# Patient Record
Sex: FEMALE | Race: WHITE | NOT HISPANIC OR LATINO | Employment: OTHER | URBAN - METROPOLITAN AREA
[De-identification: names, ages, dates, MRNs, and addresses within clinical notes are randomized per-mention and may not be internally consistent; named-entity substitution may affect disease eponyms.]

---

## 2017-01-10 ENCOUNTER — HOSPITAL ENCOUNTER (OUTPATIENT)
Dept: PHYSICAL THERAPY | Facility: MEDICAL CENTER | Age: 72
End: 2017-01-10
Attending: INTERNAL MEDICINE
Payer: MEDICARE

## 2017-01-10 PROCEDURE — G8979 MOBILITY GOAL STATUS: HCPCS | Mod: CI

## 2017-01-10 PROCEDURE — 97014 ELECTRIC STIMULATION THERAPY: CPT

## 2017-01-10 PROCEDURE — G8978 MOBILITY CURRENT STATUS: HCPCS | Mod: CJ

## 2017-01-10 PROCEDURE — 97110 THERAPEUTIC EXERCISES: CPT

## 2017-01-31 ENCOUNTER — HOSPITAL ENCOUNTER (OUTPATIENT)
Dept: PHYSICAL THERAPY | Facility: MEDICAL CENTER | Age: 72
End: 2017-01-31
Attending: INTERNAL MEDICINE
Payer: MEDICARE

## 2017-01-31 PROCEDURE — 97110 THERAPEUTIC EXERCISES: CPT

## 2017-01-31 PROCEDURE — G8979 MOBILITY GOAL STATUS: HCPCS | Mod: CI

## 2017-01-31 PROCEDURE — G8980 MOBILITY D/C STATUS: HCPCS | Mod: CI

## 2017-04-26 ENCOUNTER — HOSPITAL ENCOUNTER (OUTPATIENT)
Dept: LAB | Facility: MEDICAL CENTER | Age: 72
End: 2017-04-26
Attending: INTERNAL MEDICINE
Payer: MEDICARE

## 2017-04-26 DIAGNOSIS — E11.8 CONTROLLED TYPE 2 DIABETES MELLITUS WITH COMPLICATION, WITHOUT LONG-TERM CURRENT USE OF INSULIN (HCC): ICD-10-CM

## 2017-04-26 LAB
CREAT UR-MCNC: 104.3 MG/DL
GFR SERPL CREATININE-BSD FRML MDRD: >60 ML/MIN/1.73 M 2
MICROALBUMIN UR-MCNC: 1.2 MG/DL
MICROALBUMIN/CREAT UR: 12 MG/G (ref 0–30)

## 2017-04-26 PROCEDURE — 85025 COMPLETE CBC W/AUTO DIFF WBC: CPT

## 2017-04-26 PROCEDURE — 82570 ASSAY OF URINE CREATININE: CPT

## 2017-04-26 PROCEDURE — 80061 LIPID PANEL: CPT

## 2017-04-26 PROCEDURE — 82043 UR ALBUMIN QUANTITATIVE: CPT

## 2017-04-26 PROCEDURE — 83036 HEMOGLOBIN GLYCOSYLATED A1C: CPT

## 2017-04-26 PROCEDURE — 82306 VITAMIN D 25 HYDROXY: CPT

## 2017-04-26 PROCEDURE — 80053 COMPREHEN METABOLIC PANEL: CPT

## 2017-04-26 PROCEDURE — 36415 COLL VENOUS BLD VENIPUNCTURE: CPT

## 2017-04-27 LAB
25(OH)D3 SERPL-MCNC: 41 NG/ML (ref 30–100)
ALBUMIN SERPL BCP-MCNC: 4.1 G/DL (ref 3.2–4.9)
ALBUMIN/GLOB SERPL: 1.5 G/DL
ALP SERPL-CCNC: 48 U/L (ref 30–99)
ALT SERPL-CCNC: 20 U/L (ref 2–50)
ANION GAP SERPL CALC-SCNC: 5 MMOL/L (ref 0–11.9)
AST SERPL-CCNC: 17 U/L (ref 12–45)
BASOPHILS # BLD AUTO: 0.7 % (ref 0–1.8)
BASOPHILS # BLD: 0.03 K/UL (ref 0–0.12)
BILIRUB SERPL-MCNC: 0.4 MG/DL (ref 0.1–1.5)
BUN SERPL-MCNC: 20 MG/DL (ref 8–22)
CALCIUM SERPL-MCNC: 9.4 MG/DL (ref 8.5–10.5)
CHLORIDE SERPL-SCNC: 105 MMOL/L (ref 96–112)
CHOLEST SERPL-MCNC: 128 MG/DL (ref 100–199)
CO2 SERPL-SCNC: 28 MMOL/L (ref 20–33)
CREAT SERPL-MCNC: 0.87 MG/DL (ref 0.5–1.4)
EOSINOPHIL # BLD AUTO: 0.34 K/UL (ref 0–0.51)
EOSINOPHIL NFR BLD: 8.3 % (ref 0–6.9)
ERYTHROCYTE [DISTWIDTH] IN BLOOD BY AUTOMATED COUNT: 44.8 FL (ref 35.9–50)
EST. AVERAGE GLUCOSE BLD GHB EST-MCNC: 123 MG/DL
GLOBULIN SER CALC-MCNC: 2.8 G/DL (ref 1.9–3.5)
GLUCOSE SERPL-MCNC: 100 MG/DL (ref 65–99)
HBA1C MFR BLD: 5.9 % (ref 0–5.6)
HCT VFR BLD AUTO: 43.1 % (ref 37–47)
HDLC SERPL-MCNC: 49 MG/DL
HGB BLD-MCNC: 13.8 G/DL (ref 12–16)
IMM GRANULOCYTES # BLD AUTO: 0.01 K/UL (ref 0–0.11)
IMM GRANULOCYTES NFR BLD AUTO: 0.2 % (ref 0–0.9)
LDLC SERPL CALC-MCNC: 56 MG/DL
LYMPHOCYTES # BLD AUTO: 1.04 K/UL (ref 1–4.8)
LYMPHOCYTES NFR BLD: 25.4 % (ref 22–41)
MCH RBC QN AUTO: 28.9 PG (ref 27–33)
MCHC RBC AUTO-ENTMCNC: 32 G/DL (ref 33.6–35)
MCV RBC AUTO: 90.2 FL (ref 81.4–97.8)
MONOCYTES # BLD AUTO: 0.36 K/UL (ref 0–0.85)
MONOCYTES NFR BLD AUTO: 8.8 % (ref 0–13.4)
NEUTROPHILS # BLD AUTO: 2.31 K/UL (ref 2–7.15)
NEUTROPHILS NFR BLD: 56.6 % (ref 44–72)
NRBC # BLD AUTO: 0 K/UL
NRBC BLD AUTO-RTO: 0 /100 WBC
PLATELET # BLD AUTO: 155 K/UL (ref 164–446)
PMV BLD AUTO: 12.2 FL (ref 9–12.9)
POTASSIUM SERPL-SCNC: 3.8 MMOL/L (ref 3.6–5.5)
PROT SERPL-MCNC: 6.9 G/DL (ref 6–8.2)
RBC # BLD AUTO: 4.78 M/UL (ref 4.2–5.4)
SODIUM SERPL-SCNC: 138 MMOL/L (ref 135–145)
TRIGL SERPL-MCNC: 116 MG/DL (ref 0–149)
WBC # BLD AUTO: 4.1 K/UL (ref 4.8–10.8)

## 2017-05-09 ENCOUNTER — OFFICE VISIT (OUTPATIENT)
Dept: MEDICAL GROUP | Age: 72
End: 2017-05-09
Payer: MEDICARE

## 2017-05-09 VITALS
OXYGEN SATURATION: 95 % | TEMPERATURE: 97.9 F | DIASTOLIC BLOOD PRESSURE: 74 MMHG | BODY MASS INDEX: 35.93 KG/M2 | WEIGHT: 202.8 LBS | SYSTOLIC BLOOD PRESSURE: 146 MMHG | HEIGHT: 63 IN | HEART RATE: 71 BPM

## 2017-05-09 DIAGNOSIS — M15.9 PRIMARY OSTEOARTHRITIS INVOLVING MULTIPLE JOINTS: ICD-10-CM

## 2017-05-09 DIAGNOSIS — E55.9 VITAMIN D DEFICIENCY: ICD-10-CM

## 2017-05-09 DIAGNOSIS — R90.89 ABNORMAL FINDING ON MRI OF BRAIN: ICD-10-CM

## 2017-05-09 DIAGNOSIS — Z85.3 HISTORY OF BREAST CANCER: ICD-10-CM

## 2017-05-09 DIAGNOSIS — E66.9 OBESITY (BMI 30-39.9): ICD-10-CM

## 2017-05-09 DIAGNOSIS — Z12.31 ENCOUNTER FOR SCREENING MAMMOGRAM FOR BREAST CANCER: ICD-10-CM

## 2017-05-09 DIAGNOSIS — I10 ESSENTIAL HYPERTENSION: ICD-10-CM

## 2017-05-09 DIAGNOSIS — E78.2 MIXED HYPERLIPIDEMIA: ICD-10-CM

## 2017-05-09 DIAGNOSIS — E11.8 CONTROLLED TYPE 2 DIABETES MELLITUS WITH COMPLICATION, WITHOUT LONG-TERM CURRENT USE OF INSULIN (HCC): ICD-10-CM

## 2017-05-09 PROCEDURE — 99215 OFFICE O/P EST HI 40 MIN: CPT | Performed by: INTERNAL MEDICINE

## 2017-05-09 ASSESSMENT — ENCOUNTER SYMPTOMS
EYES NEGATIVE: 1
CONSTITUTIONAL NEGATIVE: 1
RESPIRATORY NEGATIVE: 1
GASTROINTESTINAL NEGATIVE: 1
NEUROLOGICAL NEGATIVE: 1
MUSCULOSKELETAL NEGATIVE: 1
CARDIOVASCULAR NEGATIVE: 1
PSYCHIATRIC NEGATIVE: 1

## 2017-05-09 ASSESSMENT — PATIENT HEALTH QUESTIONNAIRE - PHQ9: CLINICAL INTERPRETATION OF PHQ2 SCORE: 0

## 2017-05-09 NOTE — MR AVS SNAPSHOT
"        Jovita Llanos   2017 4:00 PM   Office Visit   MRN: [id number]    Department:  [address] [place] [address]   Dept Phone:  [phone]    Description:  Female : 1945   Provider:  Saurabh Ventura M.D.           Reason for Visit     Hyperlipidemia lab review      Allergies as of 2017     Allergen Noted Reactions    Other Environmental 2015   Runny Nose    Pt. Has seasonal allergies with itchy watery eyes.      You were diagnosed with     Controlled type 2 diabetes mellitus with complication, without long-term current use of insulin (CMS-[facility])   [8273063]       Abnormal finding on MRI of brain   [113889]       Essential hypertension   [1507809]       Mixed hyperlipidemia   [272.2.ICD-9-CM]       Obesity (BMI 30-39.9)   [053595]       Primary osteoarthritis involving multiple joints   [8849480]       Vitamin D deficiency   [2460308]       Abnormal finding on MRI of brain   [382674]       History of breast cancer   [787081]       Encounter for screening mammogram for breast cancer   [1584861]         Vital Signs     Blood Pressure Pulse Temperature Height Weight Body Mass Index    146/74 mmHg 71 36.6 °C (97.9 °F) 1.59 m (5' 2.6\") 91.989 kg (202 lb 12.8 oz) 36.39 kg/m2    Oxygen Saturation Smoking Status                95% Never Smoker           Basic Information     Date Of Birth Sex Race Ethnicity Preferred Language    1945 Female White Unknown English      Your appointments     2017  1:00 PM   Diabetes Care Visit with Saurabh Ventura M.D., MAURICIO DIABETES RN   [address] [id number] [phone]           You will be receiving a confirmation call a few days before your appointment from our automated call confirmation system.              Problem List              ICD-10-CM Priority Class Noted - Resolved    Mixed hyperlipidemia E78.2   2014 - Present    Essential hypertension I10   2014 - Present    Obesity " (BMI 30-39.9) E66.9   8/27/2014 - Present    Primary osteoarthritis involving multiple joints M15.0   8/27/2014 - Present    History of breast cancer- 2001; RT and lumpectomy; right side; neg nodes; tamoxifen x 5 yrs Z85.3   8/27/2014 - Present    Vitamin D deficiency E55.9   8/27/2014 - Present    Osteoporosis, post-menopausal- since about 2002; on fosamax since early 2000s M81.0   9/17/2014 - Present    Controlled type 2 diabetes mellitus with complication, without long-term current use of insulin (CMS-[facility]CC) E11.8   10/27/2015 - Present    Left-sided low back pain with left-sided sciatica M54.42   4/27/2016 - Present    Tongue deviation K14.8   6/29/2016 - Present    Chronic low back pain- dr rose M54.5, G89.29   6/29/2016 - Present    Lumbosacral spondylosis M47.817   6/29/2016 - Present    DDD (degenerative disc disease), lumbar M51.36   6/29/2016 - Present    Lumbar radiculopathy M54.16   6/29/2016 - Present    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, 2016- rechk mri high contrast oct., 2017- dr harvey D16.4   10/24/2016 - Present    Abnormal finding on MRI of brain- osteoma or calcified meningioma frontal bone/lobe- 2016 R90.89   5/9/2017 - Present      Health Maintenance        Date Due Completion Dates    IMM DTaP/Tdap/Td Vaccine (1 - Tdap) 9/12/1966 ---    IMM ZOSTER VACCINE 9/12/2007 ---    DIABETES MONOFILAMENT / LE EXAM 4/27/2017 4/27/2016, 3/30/2015 (N/S), 3/30/2015 (N/S), 9/17/2014 (N/S), 8/27/2014 (N/S)    Override on 3/30/2015: (N/S)    Override on 3/30/2015: (N/S)    Override on 9/17/2014: (N/S)    Override on 8/27/2014: (N/S)    RETINAL SCREENING 6/28/2017 6/28/2016, 1/7/2015    A1C SCREENING 10/26/2017 4/26/2017, 10/24/2016, 4/20/2016, 10/21/2015, 3/11/2015, 9/9/2014    MAMMOGRAM 3/31/2018 3/31/2016, 3/9/2016 (Done), 11/18/2014, 2/6/2007, 12/1/2005, 11/30/2004, 5/26/2004    Override on 3/9/2016: Done (pt had it performed at [facility])    FASTING LIPID PROFILE 4/26/2018 4/26/2017, 10/20/2016,  4/20/2016, 10/21/2015, 3/11/2015, 9/9/2014    URINE ACR / MICROALBUMIN 4/26/2018 4/26/2017, 10/20/2016, 4/20/2016, 10/21/2015, 3/11/2015, 9/9/2014    SERUM CREATININE 4/26/2018 4/26/2017, 10/20/2016, 4/20/2016, 10/21/2015, 3/11/2015, 9/9/2014    PAP SMEAR 3/9/2019 3/9/2016, 3/9/2016 (Done)    Override on 3/9/2016: Done    COLONOSCOPY 1/16/2020 1/16/2013    BONE DENSITY 11/10/2021 11/10/2016, 9/2/2014, 8/27/2004 (Prv Comp)    Override on 8/27/2004: Previously completed            Current Immunizations     13-VALENT PCV PREVNAR 3/30/2015    Influenza Vaccine Adult HD 10/24/2016  4:27 PM, 10/27/2015  2:50 PM    Pneumococcal polysaccharide vaccine (PPSV-23) 4/27/2013    SHINGLES VACCINE 5/9/2013      Below and/or attached are the medications your provider expects you to take. Review all of your home medications and newly ordered medications with your provider and/or pharmacist. Follow medication instructions as directed by your provider and/or pharmacist. Please keep your medication list with you and share with your provider. Update the information when medications are discontinued, doses are changed, or new medications (including over-the-counter products) are added; and carry medication information at all times in the event of emergency situations     Allergies:  OTHER ENVIRONMENTAL - Runny Nose               Medications  Valid as of: May 09, 2017 -  4:37 PM    Generic Name Brand Name Tablet Size Instructions for use    Alendronate Sodium (Tab) FOSAMAX 70 MG Take 1 Tab by mouth every 7 days.        Aspirin (Tablet Delayed Response) ECOTRIN 81 MG Take 1 Tab by mouth every day.        Atorvastatin Calcium (Tab) LIPITOR 20 MG Take 1 Tab by mouth every day.        Calcium Citrate-Vitamin D   Take  by mouth 2 Times a Day.        Cholecalciferol (Cap) Vitamin D 2000 UNITS Take 1 Cap by mouth every day.        Glucosamine Sulfate (Cap) glucosamine Sulfate 500 MG Take 500 mg by mouth 3 times a day, with meals.         Irbesartan (Tab) AVAPRO 300 MG Take 1 Tab by mouth every day.        Pioglitazone HCl (Tab) ACTOS 45 MG Take 1 Tab by mouth every day.        .                 Medicines prescribed today were sent to:     [facility] #[id number] - [place] - [address]    [address] [place] [address]    Phone: [phone] Fax: [phone]    Open 24 Hours?: No    [facility] - [place], [place] - [address]    [address] [address]    Phone: [phone] Fax: [phone]    Open 24 Hours?: No      Medication refill instructions:       If your prescription bottle indicates you have medication refills left, it is not necessary to call your provider’s office. Please contact your pharmacy and they will refill your medication.    If your prescription bottle indicates you do not have any refills left, you may request refills at any time through one of the following ways: The online light system (except Urgent Care), by calling your provider’s office, or by asking your pharmacy to contact your provider’s office with a refill request. Medication refills are processed only during regular business hours and may not be available until the next business day. Your provider may request additional information or to have a follow-up visit with you prior to refilling your medication.   *Please Note: Medication refills are assigned a new Rx number when refilled electronically. Your pharmacy may indicate that no refills were authorized even though a new prescription for the same medication is available at the pharmacy. Please request the medicine by name with the pharmacy before contacting your provider for a refill.        Your To Do List     Future Labs/Procedures Complete By Expires    CBC WITH DIFFERENTIAL  As directed 5/9/2018    COMP METABOLIC PANEL  As directed 5/9/2018    HEMOGLOBIN A1C  As directed 5/9/2018    LIPID PROFILE  As directed 5/9/2018    MA-SCREEN MAMMO W/CAD-BILAT  As  directed 6/8/2018    MICROALBUMIN CREAT RATIO URINE  As directed 5/9/2018    MR-BRAIN-WITH & W/O  As directed 5/9/2018    VITAMIN D,25 HYDROXY  As directed 5/9/2018      Referral     A referral request has been sent to our patient care coordination department. Please allow 3-5 business days for us to process this request and contact you either by phone or mail. If you do not hear from us by the 5th business day, please call us at [phone].           [employer] Access Code: Activation code not generated  Current Avat[employer] Status: Active

## 2017-05-10 NOTE — PROGRESS NOTES
Subjective:      Jovita Llanos is a 71 y.o. female who presents with Hyperlipidemia  The patient is here for followup of chronic medical problems listed below. The patient is compliant with medications and having no side effects from them. Denies chest pain, abdominal pain, dyspnea, myalgias, or cough.   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, 2016- rechk mri high contrast oct., 2017- dr harvey 10/24/2016   • Chronic low back pain- dr rose 06/29/2016   • Lumbosacral spondylosis 06/29/2016   • DDD (degenerative disc disease), lumbar 06/29/2016   • Lumbar radiculopathy 06/29/2016   • Left-sided low back pain with left-sided sciatica 04/27/2016   • Controlled type 2 diabetes mellitus with complication, without long-term current use of insulin (CMS-[facility]) 10/27/2015   • Osteoporosis, post-menopausal- since about 2002; on fosamax since early 2000s 09/17/2014   • Mixed hyperlipidemia 08/27/2014   • Essential hypertension 08/27/2014   • Obesity (BMI 30-39.9) 08/27/2014   • Primary osteoarthritis involving multiple joints 08/27/2014   • History of breast cancer- 2001; RT and lumpectomy; right side; neg nodes; tamoxifen x 5 yrs 08/27/2014   • Vitamin D deficiency 08/27/2014     Allergies   Allergen Reactions   • Other Environmental Runny Nose     Pt. Has seasonal allergies with itchy watery eyes.     Outpatient Prescriptions Prior to Visit   Medication Sig Dispense Refill   • alendronate (FOSAMAX) 70 MG Tab Take 1 Tab by mouth every 7 days. 16 Tab 4   • atorvastatin (LIPITOR) 20 MG Tab Take 1 Tab by mouth every day. 90 Tab 4   • irbesartan (AVAPRO) 300 MG Tab Take 1 Tab by mouth every day. 90 Tab 4   • pioglitazone (ACTOS) 45 MG Tab Take 1 Tab by mouth every day. 90 Tab 4   • glucosamine Sulfate 500 MG Cap Take 500 mg by mouth 3 times a day, with meals.     •  "Cholecalciferol (VITAMIN D) 2000 UNITS Cap Take 1 Cap by mouth every day. 100 Cap 3   • aspirin EC (ECOTRIN) 81 MG TBEC Take 1 Tab by mouth every day. 30 Tab    • Calcium Carbonate-Vitamin D (CALCIUM + D PO) Take  by mouth 2 Times a Day.       No facility-administered medications prior to visit.              HPI    Review of Systems   Constitutional: Negative.    HENT: Negative.    Eyes: Negative.    Respiratory: Negative.    Cardiovascular: Negative.    Gastrointestinal: Negative.    Genitourinary: Negative.    Musculoskeletal: Negative.    Skin: Negative.    Neurological: Negative.    Endo/Heme/Allergies: Negative.    Psychiatric/Behavioral: Negative.           Objective:     /74 mmHg  Pulse 71  Temp(Src) 36.6 °C (97.9 °F)  Ht 1.59 m (5' 2.6\")  Wt 91.989 kg (202 lb 12.8 oz)  BMI 36.39 kg/m2  SpO2 95%     Physical Exam   Constitutional: She is oriented to person, place, and time. She appears well-developed and well-nourished.   HENT:   Head: Normocephalic and atraumatic.   Right Ear: External ear normal.   Left Ear: External ear normal.   Nose: Nose normal.   Mouth/Throat: Oropharynx is clear and moist.   Eyes: Conjunctivae and EOM are normal. Pupils are equal, round, and reactive to light. Right eye exhibits no discharge. Left eye exhibits no discharge. No scleral icterus.   Neck: Normal range of motion. Neck supple. No JVD present. No tracheal deviation present. No thyromegaly present.   Cardiovascular: Normal rate, regular rhythm, normal heart sounds and intact distal pulses.  Exam reveals no gallop and no friction rub.    Pulmonary/Chest: Effort normal and breath sounds normal. No stridor. No respiratory distress. She has no wheezes. She has no rales. She exhibits no tenderness.   Abdominal: Soft. Bowel sounds are normal. She exhibits no distension and no mass. There is no tenderness. There is no rebound and no guarding. No hernia.   Musculoskeletal: Normal range of motion. She exhibits no edema or " tenderness.   Lymphadenopathy:     She has no cervical adenopathy.   Neurological: She is alert and oriented to person, place, and time. She has normal reflexes. She displays normal reflexes. No cranial nerve deficit. Coordination normal.   Skin: Skin is warm and dry. No rash noted. No erythema. No pallor.   Psychiatric: She has a normal mood and affect. Her behavior is normal. Judgment and thought content normal.   Nursing note and vitals reviewed.        Invalid input(s):  ABSCRN,  CBC PLTDF,  HEMOGLOBIN,  PLATELETCT,  HBA1C,  AOUULRA9DK, HIV, CHLAM;ll  Lab Results   Component Value Date/Time    GLYCOHEMOGLOBIN 5.9* 04/26/2017 09:58 AM    GLYCOHEMOGLOBIN 5.7 10/24/2016 02:40 PM     Lab Results   Component Value Date/Time    SODIUM 138 04/26/2017 09:58 AM    POTASSIUM 3.8 04/26/2017 09:58 AM    CHLORIDE 105 04/26/2017 09:58 AM    CO2 28 04/26/2017 09:58 AM    GLUCOSE 100* 04/26/2017 09:58 AM    BUN 20 04/26/2017 09:58 AM    CREATININE 0.87 04/26/2017 09:58 AM    ALKALINE PHOSPHATASE 48 04/26/2017 09:58 AM    AST(SGOT) 17 04/26/2017 09:58 AM    ALT(SGPT) 20 04/26/2017 09:58 AM    TOTAL BILIRUBIN 0.4 04/26/2017 09:58 AM     No results found for: INR  Lab Results   Component Value Date/Time    CHOLESTEROL, 04/26/2017 09:58 AM    LDL 56 04/26/2017 09:58 AM    HDL 49 04/26/2017 09:58 AM    TRIGLYCERIDES 116 04/26/2017 09:58 AM       No results found for: TESTOSTERONE  No results found for: TSH  Lab Results   Component Value Date/Time    FREE T-4 0.97 09/09/2014 09:25 AM     No results found for: URICACID  No components found for: VITB12  Lab Results   Component Value Date/Time    25-HYDROXY   VITAMIN D 25 41 04/26/2017 09:58 AM    25-HYDROXY   VITAMIN D 25 44 10/20/2016 09:14 AM                    Assessment/Plan:     1. Controlled type 2 diabetes mellitus with complication, without long-term current use of insulin (CMS-HCC)   Under good control. Continue same regimen.    - COMP METABOLIC PANEL; Future  - LIPID  PROFILE; Future  - CBC WITH DIFFERENTIAL; Future  - HEMOGLOBIN A1C; Future  - MICROALBUMIN CREAT RATIO URINE; Future  - REFERRAL FOR RETINAL SCREENING EXAM  - Diabetic Monofilament Lower Extremity Exam    2. Abnormal finding on MRI of brain- needs repeat in 2 mos     - MR-BRAIN-WITH & W/O; Future    3. Essential hypertension   Under good control. Continue same regimen.    4. Mixed hyperlipidemia   Under good control. Continue same regimen.  5. Obesity (BMI 30-39.9)   Under good control. Continue same regimen.  - Patient identified as having weight management issue.  Appropriate orders and counseling given.    6. Primary osteoarthritis involving multiple joints   Under good control. Continue same regimen.    7. Vitamin D deficiencyUnder good control. Continue same regimen.     - VITAMIN D,25 HYDROXY; Future    8. Abnormal finding on MRI of brain- osteoma or calcified meningioma frontal bone/lobe- 2016Under good control. Continue same regimen.     - MR-BRAIN-WITH & W/O; Future    9. History of breast cancer- 2001; RT and lumpectomy; right side; neg nodes; tamoxifen x 5 yrs       10. Encounter for screening mammogram for breast cancer        - MA-SCREEN MAMMO W/CAD-BILAT; Future      40 minute face-to-face encounter took place today.  More than half of this time was spent in the coordination of care of the above problems, as well as counseling.

## 2017-07-10 ENCOUNTER — HOSPITAL ENCOUNTER (OUTPATIENT)
Dept: RADIOLOGY | Facility: MEDICAL CENTER | Age: 72
End: 2017-07-10
Attending: INTERNAL MEDICINE
Payer: MEDICARE

## 2017-07-10 DIAGNOSIS — Z12.31 ENCOUNTER FOR SCREENING MAMMOGRAM FOR BREAST CANCER: ICD-10-CM

## 2017-07-10 PROCEDURE — 77063 BREAST TOMOSYNTHESIS BI: CPT

## 2017-08-11 ENCOUNTER — TELEPHONE (OUTPATIENT)
Dept: MEDICAL GROUP | Age: 72
End: 2017-08-11

## 2017-08-11 DIAGNOSIS — Z01.818 PREOP TESTING: ICD-10-CM

## 2017-08-11 NOTE — TELEPHONE ENCOUNTER
----- Message from Cherry Freitas sent at 8/11/2017 10:57 AM PDT -----  Pt is getting a mri the lab needs creatin ordered and the expected date changed since it is in October

## 2017-08-14 ENCOUNTER — APPOINTMENT (OUTPATIENT)
Dept: RADIOLOGY | Facility: MEDICAL CENTER | Age: 72
End: 2017-08-14
Attending: INTERNAL MEDICINE
Payer: MEDICARE

## 2017-08-14 NOTE — TELEPHONE ENCOUNTER
Phone Number Called: [phone] (home)     Message: Patient informed.    Left Message for patient to call back: N\A

## 2017-09-01 ENCOUNTER — PATIENT OUTREACH (OUTPATIENT)
Dept: HEALTH INFORMATION MANAGEMENT | Facility: OTHER | Age: 72
End: 2017-09-01

## 2017-09-18 NOTE — PROGRESS NOTES
Outcome: Left Message    Please transfer to Patient Outreach Team at [phone] when patient returns call.      Attempt # 2

## 2017-09-20 NOTE — PROGRESS NOTES
Attempt #:3    WebIZ Checked & Epic Updated: yes  [facility]Connect Verified: no  Verify PCP: yes    Communication Preference Obtained: yes     Review Care Team: yes    Annual Wellness Visit Scheduling  1. Scheduling Status:Not Scheduled. Patient states they are not interested        Care Gap Scheduling (Attempt to Schedule EACH Overdue Care Gap!)     Health Maintenance Due   Topic Date Due   • IMM DTaP/Tdap/Td Vaccine (1 - Tdap) PT [facility] DOES NOT COVER/DOES NOT WANT TO SCHEDULE AT THE MOMENT   • IMM INFLUENZA (1) SCHEDULED         MyChart Activation: already active  [email or web address] Lorri: no  Virtual Visits: no  Opt In to Text Messages: no

## 2017-10-16 ENCOUNTER — HOSPITAL ENCOUNTER (OUTPATIENT)
Dept: LAB | Facility: MEDICAL CENTER | Age: 72
End: 2017-10-16
Attending: INTERNAL MEDICINE
Payer: MEDICARE

## 2017-10-16 DIAGNOSIS — E11.8 CONTROLLED TYPE 2 DIABETES MELLITUS WITH COMPLICATION, WITHOUT LONG-TERM CURRENT USE OF INSULIN (HCC): ICD-10-CM

## 2017-10-16 DIAGNOSIS — E55.9 VITAMIN D DEFICIENCY: ICD-10-CM

## 2017-10-16 LAB
CREAT UR-MCNC: 45 MG/DL
GFR SERPL CREATININE-BSD FRML MDRD: >60 ML/MIN/1.73 M 2
MICROALBUMIN UR-MCNC: <0.7 MG/DL
MICROALBUMIN/CREAT UR: NORMAL MG/G (ref 0–30)

## 2017-10-16 PROCEDURE — 85025 COMPLETE CBC W/AUTO DIFF WBC: CPT

## 2017-10-16 PROCEDURE — 83036 HEMOGLOBIN GLYCOSYLATED A1C: CPT | Mod: GA

## 2017-10-16 PROCEDURE — 36415 COLL VENOUS BLD VENIPUNCTURE: CPT

## 2017-10-16 PROCEDURE — 80053 COMPREHEN METABOLIC PANEL: CPT

## 2017-10-16 PROCEDURE — 82043 UR ALBUMIN QUANTITATIVE: CPT

## 2017-10-16 PROCEDURE — 82570 ASSAY OF URINE CREATININE: CPT

## 2017-10-16 PROCEDURE — 80061 LIPID PANEL: CPT

## 2017-10-16 PROCEDURE — 82306 VITAMIN D 25 HYDROXY: CPT

## 2017-10-17 LAB
25(OH)D3 SERPL-MCNC: 47 NG/ML (ref 30–100)
ALBUMIN SERPL BCP-MCNC: 3.8 G/DL (ref 3.2–4.9)
ALBUMIN/GLOB SERPL: 1.3 G/DL
ALP SERPL-CCNC: 46 U/L (ref 30–99)
ALT SERPL-CCNC: 19 U/L (ref 2–50)
ANION GAP SERPL CALC-SCNC: 8 MMOL/L (ref 0–11.9)
AST SERPL-CCNC: 19 U/L (ref 12–45)
BASOPHILS # BLD AUTO: 1.1 % (ref 0–1.8)
BASOPHILS # BLD: 0.04 K/UL (ref 0–0.12)
BILIRUB SERPL-MCNC: 0.4 MG/DL (ref 0.1–1.5)
BUN SERPL-MCNC: 18 MG/DL (ref 8–22)
CALCIUM SERPL-MCNC: 9.2 MG/DL (ref 8.5–10.5)
CHLORIDE SERPL-SCNC: 107 MMOL/L (ref 96–112)
CHOLEST SERPL-MCNC: 126 MG/DL (ref 100–199)
CO2 SERPL-SCNC: 26 MMOL/L (ref 20–33)
CREAT SERPL-MCNC: 0.65 MG/DL (ref 0.5–1.4)
EOSINOPHIL # BLD AUTO: 0.28 K/UL (ref 0–0.51)
EOSINOPHIL NFR BLD: 7.4 % (ref 0–6.9)
ERYTHROCYTE [DISTWIDTH] IN BLOOD BY AUTOMATED COUNT: 44.6 FL (ref 35.9–50)
EST. AVERAGE GLUCOSE BLD GHB EST-MCNC: 126 MG/DL
GLOBULIN SER CALC-MCNC: 3 G/DL (ref 1.9–3.5)
GLUCOSE SERPL-MCNC: 93 MG/DL (ref 65–99)
HBA1C MFR BLD: 6 % (ref 0–5.6)
HCT VFR BLD AUTO: 41.9 % (ref 37–47)
HDLC SERPL-MCNC: 47 MG/DL
HGB BLD-MCNC: 13.7 G/DL (ref 12–16)
IMM GRANULOCYTES # BLD AUTO: 0.01 K/UL (ref 0–0.11)
IMM GRANULOCYTES NFR BLD AUTO: 0.3 % (ref 0–0.9)
LDLC SERPL CALC-MCNC: 58 MG/DL
LYMPHOCYTES # BLD AUTO: 0.96 K/UL (ref 1–4.8)
LYMPHOCYTES NFR BLD: 25.4 % (ref 22–41)
MCH RBC QN AUTO: 29.5 PG (ref 27–33)
MCHC RBC AUTO-ENTMCNC: 32.7 G/DL (ref 33.6–35)
MCV RBC AUTO: 90.1 FL (ref 81.4–97.8)
MONOCYTES # BLD AUTO: 0.32 K/UL (ref 0–0.85)
MONOCYTES NFR BLD AUTO: 8.5 % (ref 0–13.4)
NEUTROPHILS # BLD AUTO: 2.17 K/UL (ref 2–7.15)
NEUTROPHILS NFR BLD: 57.3 % (ref 44–72)
NRBC # BLD AUTO: 0 K/UL
NRBC BLD AUTO-RTO: 0 /100 WBC
PLATELET # BLD AUTO: 128 K/UL (ref 164–446)
PMV BLD AUTO: 12.6 FL (ref 9–12.9)
POTASSIUM SERPL-SCNC: 4 MMOL/L (ref 3.6–5.5)
PROT SERPL-MCNC: 6.8 G/DL (ref 6–8.2)
RBC # BLD AUTO: 4.65 M/UL (ref 4.2–5.4)
SODIUM SERPL-SCNC: 141 MMOL/L (ref 135–145)
TRIGL SERPL-MCNC: 104 MG/DL (ref 0–149)
WBC # BLD AUTO: 3.8 K/UL (ref 4.8–10.8)

## 2017-10-26 ENCOUNTER — APPOINTMENT (OUTPATIENT)
Dept: RADIOLOGY | Facility: MEDICAL CENTER | Age: 72
End: 2017-10-26
Attending: INTERNAL MEDICINE
Payer: MEDICARE

## 2017-11-09 ENCOUNTER — TELEPHONE (OUTPATIENT)
Dept: MEDICAL GROUP | Age: 72
End: 2017-11-09

## 2017-11-09 ENCOUNTER — HOSPITAL ENCOUNTER (OUTPATIENT)
Dept: RADIOLOGY | Facility: MEDICAL CENTER | Age: 72
End: 2017-11-09
Attending: INTERNAL MEDICINE
Payer: MEDICARE

## 2017-11-09 DIAGNOSIS — R90.89 ABNORMAL FINDING ON MRI OF BRAIN: ICD-10-CM

## 2017-11-09 PROCEDURE — 70553 MRI BRAIN STEM W/O & W/DYE: CPT

## 2017-11-09 PROCEDURE — A9579 GAD-BASE MR CONTRAST NOS,1ML: HCPCS | Performed by: INTERNAL MEDICINE

## 2017-11-09 PROCEDURE — 700117 HCHG RX CONTRAST REV CODE 255: Performed by: INTERNAL MEDICINE

## 2017-11-09 RX ADMIN — GADODIAMIDE 20 ML: 287 INJECTION INTRAVENOUS at 09:08

## 2017-11-09 NOTE — TELEPHONE ENCOUNTER
Phone Number Called: [phone] (home)     Message: called pt left message for pt to call back.     Left Message for patient to call back: yes

## 2017-11-09 NOTE — TELEPHONE ENCOUNTER
----- Message from Saurabh Ventura M.D. sent at 11/9/2017 12:12 PM PST -----  No change from previous MRI

## 2017-11-10 ENCOUNTER — OFFICE VISIT (OUTPATIENT)
Dept: MEDICAL GROUP | Age: 72
End: 2017-11-10
Payer: MEDICARE

## 2017-11-10 VITALS
DIASTOLIC BLOOD PRESSURE: 70 MMHG | TEMPERATURE: 98.1 F | OXYGEN SATURATION: 97 % | HEART RATE: 90 BPM | BODY MASS INDEX: 35.08 KG/M2 | WEIGHT: 198 LBS | SYSTOLIC BLOOD PRESSURE: 124 MMHG | HEIGHT: 63 IN

## 2017-11-10 DIAGNOSIS — R07.89 RIGHT-SIDED CHEST WALL PAIN: ICD-10-CM

## 2017-11-10 DIAGNOSIS — E11.8 CONTROLLED TYPE 2 DIABETES MELLITUS WITH COMPLICATION, WITHOUT LONG-TERM CURRENT USE OF INSULIN (HCC): ICD-10-CM

## 2017-11-10 DIAGNOSIS — M81.0 OSTEOPOROSIS, POST-MENOPAUSAL: ICD-10-CM

## 2017-11-10 DIAGNOSIS — E55.9 VITAMIN D DEFICIENCY: ICD-10-CM

## 2017-11-10 DIAGNOSIS — Z23 NEED FOR INFLUENZA VACCINATION: ICD-10-CM

## 2017-11-10 DIAGNOSIS — M85.80 OSTEOPENIA, UNSPECIFIED LOCATION: ICD-10-CM

## 2017-11-10 DIAGNOSIS — E66.9 OBESITY (BMI 30-39.9): ICD-10-CM

## 2017-11-10 DIAGNOSIS — I10 ESSENTIAL HYPERTENSION: ICD-10-CM

## 2017-11-10 DIAGNOSIS — R90.89 ABNORMAL FINDING ON MRI OF BRAIN: ICD-10-CM

## 2017-11-10 DIAGNOSIS — E78.2 MIXED HYPERLIPIDEMIA: ICD-10-CM

## 2017-11-10 PROCEDURE — 99215 OFFICE O/P EST HI 40 MIN: CPT | Mod: 25 | Performed by: INTERNAL MEDICINE

## 2017-11-10 PROCEDURE — 90662 IIV NO PRSV INCREASED AG IM: CPT | Performed by: INTERNAL MEDICINE

## 2017-11-10 PROCEDURE — G0008 ADMIN INFLUENZA VIRUS VAC: HCPCS | Performed by: INTERNAL MEDICINE

## 2017-11-10 RX ORDER — ATORVASTATIN CALCIUM 20 MG/1
20 TABLET, FILM COATED ORAL DAILY
Qty: 90 TAB | Refills: 4 | Status: SHIPPED | OUTPATIENT
Start: 2017-11-10 | End: 2019-02-25 | Stop reason: SDUPTHER

## 2017-11-10 RX ORDER — PIOGLITAZONEHYDROCHLORIDE 45 MG/1
45 TABLET ORAL DAILY
Qty: 90 TAB | Refills: 4 | Status: SHIPPED | OUTPATIENT
Start: 2017-11-10 | End: 2019-04-29 | Stop reason: SDUPTHER

## 2017-11-10 RX ORDER — IRBESARTAN 300 MG/1
300 TABLET ORAL DAILY
Qty: 90 TAB | Refills: 4 | Status: SHIPPED | OUTPATIENT
Start: 2017-11-10 | End: 2019-02-25 | Stop reason: SDUPTHER

## 2017-11-10 RX ORDER — ALENDRONATE SODIUM 70 MG/1
70 TABLET ORAL
Qty: 16 TAB | Refills: 4 | Status: SHIPPED | OUTPATIENT
Start: 2017-11-10 | End: 2019-04-29 | Stop reason: SDUPTHER

## 2017-11-10 ASSESSMENT — ENCOUNTER SYMPTOMS
GASTROINTESTINAL NEGATIVE: 1
PSYCHIATRIC NEGATIVE: 1
MUSCULOSKELETAL NEGATIVE: 1
CARDIOVASCULAR NEGATIVE: 1
EYES NEGATIVE: 1
RESPIRATORY NEGATIVE: 1
NEUROLOGICAL NEGATIVE: 1
CONSTITUTIONAL NEGATIVE: 1

## 2017-11-10 NOTE — PROGRESS NOTES
Subjective:      Jovita Llanos is a 72 y.o. female who presents with Hypertension (evaluation on blood work results) and Breast Pain (right breast pain on and off pt would like to discuss )  AND   The patient is here for followup of chronic medical problems listed below. The patient is compliant with medications and having no side effects from them. Denies chest pain, abdominal pain, dyspnea, myalgias, or cough.   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, 2016- rechk mri high contrast oct., 2017- dr harvey 10/24/2016   • Chronic low back pain- dr rose 06/29/2016   • Lumbosacral spondylosis 06/29/2016   • DDD (degenerative disc disease), lumbar 06/29/2016   • Lumbar radiculopathy 06/29/2016   • Left-sided low back pain with left-sided sciatica 04/27/2016   • Controlled type 2 diabetes mellitus with complication, without long-term current use of insulin (CMS-[facility]) 10/27/2015   • Osteoporosis, post-menopausal- since about 2002; on fosamax since early 2000s 09/17/2014   • Mixed hyperlipidemia 08/27/2014   • Essential hypertension 08/27/2014   • Obesity (BMI 30-39.9) 08/27/2014   • Primary osteoarthritis involving multiple joints 08/27/2014   • History of breast cancer- 2001; RT and lumpectomy; right side; neg nodes; tamoxifen x 5 yrs 08/27/2014   • Vitamin D deficiency 08/27/2014     Allergies   Allergen Reactions   • Other Environmental Runny Nose     Pt. Has seasonal allergies with itchy watery eyes.     Outpatient Medications Prior to Visit   Medication Sig Dispense Refill   • glucosamine Sulfate 500 MG Cap Take 500 mg by mouth 3 times a day, with meals.     • Cholecalciferol (VITAMIN D) 2000 UNITS Cap Take 1 Cap by mouth every day. 100 Cap 3   • aspirin EC (ECOTRIN) 81 MG TBEC Take 1 Tab by mouth every  "day. 30 Tab    • Calcium Carbonate-Vitamin D (CALCIUM + D PO) Take  by mouth 2 Times a Day.     • alendronate (FOSAMAX) 70 MG Tab Take 1 Tab by mouth every 7 days. 16 Tab 4   • atorvastatin (LIPITOR) 20 MG Tab Take 1 Tab by mouth every day. 90 Tab 4   • irbesartan (AVAPRO) 300 MG Tab Take 1 Tab by mouth every day. 90 Tab 4   • pioglitazone (ACTOS) 45 MG Tab Take 1 Tab by mouth every day. 90 Tab 4     No facility-administered medications prior to visit.                HPI    Review of Systems   Constitutional: Negative.    HENT: Negative.    Eyes: Negative.    Respiratory: Negative.    Cardiovascular: Negative.    Gastrointestinal: Negative.    Genitourinary: Negative.    Musculoskeletal: Negative.    Skin: Negative.    Neurological: Negative.    Endo/Heme/Allergies: Negative.    Psychiatric/Behavioral: Negative.           Objective:     /70   Pulse 90   Temp 36.7 °C (98.1 °F)   Ht 1.59 m (5' 2.6\")   Wt 89.8 kg (198 lb)   SpO2 97%   BMI 35.53 kg/m²      Physical Exam   Constitutional: She is oriented to person, place, and time. She appears well-developed and well-nourished. No distress.   HENT:   Head: Normocephalic and atraumatic.   Right Ear: External ear normal.   Left Ear: External ear normal.   Nose: Nose normal.   Mouth/Throat: Oropharynx is clear and moist. No oropharyngeal exudate.   Eyes: Conjunctivae and EOM are normal. Pupils are equal, round, and reactive to light. Right eye exhibits no discharge. Left eye exhibits no discharge. No scleral icterus.   Neck: Normal range of motion. Neck supple. No JVD present. No tracheal deviation present. No thyromegaly present.   Cardiovascular: Normal rate, regular rhythm, normal heart sounds and intact distal pulses.  Exam reveals no gallop and no friction rub.    No murmur heard.  Pulmonary/Chest: Effort normal and breath sounds normal. No stridor. No respiratory distress. She has no wheezes. She has no rales. She exhibits no tenderness.   Abdominal: Soft. " Bowel sounds are normal. She exhibits no distension and no mass. There is no tenderness. There is no rebound and no guarding.   Musculoskeletal: Normal range of motion. She exhibits no edema or tenderness.   Lymphadenopathy:     She has no cervical adenopathy.   Neurological: She is alert and oriented to person, place, and time. She has normal reflexes. She displays normal reflexes. No cranial nerve deficit. She exhibits normal muscle tone. Coordination normal.   Skin: Skin is warm and dry. No rash noted. She is not diaphoretic. No erythema. No pallor.   Psychiatric: She has a normal mood and affect. Her behavior is normal. Judgment and thought content normal.   Vitals reviewed.    Hospital Outpatient Visit on 10/16/2017   Component Date Value   • WBC 10/17/2017 3.8*   • RBC 10/17/2017 4.65    • Hemoglobin 10/17/2017 13.7    • Hematocrit 10/17/2017 41.9    • MCV 10/17/2017 90.1    • MCH 10/17/2017 29.5    • MCHC 10/17/2017 32.7*   • RDW 10/17/2017 44.6    • Platelet Count 10/17/2017 128*   • MPV 10/17/2017 12.6    • Neutrophils-Polys 10/17/2017 57.30    • Lymphocytes 10/17/2017 25.40    • Monocytes 10/17/2017 8.50    • Eosinophils 10/17/2017 7.40*   • Basophils 10/17/2017 1.10    • Immature Granulocytes 10/17/2017 0.30    • Nucleated RBC 10/17/2017 0.00    • Neutrophils (Absolute) 10/17/2017 2.17    • Lymphs (Absolute) 10/17/2017 0.96*   • Monos (Absolute) 10/17/2017 0.32    • Eos (Absolute) 10/17/2017 0.28    • Baso (Absolute) 10/17/2017 0.04    • Immature Granulocytes (a* 10/17/2017 0.01    • NRBC (Absolute) 10/17/2017 0.00    • Glycohemoglobin 10/17/2017 6.0*   • Est Avg Glucose 10/17/2017 126    • Creatinine, Urine 10/16/2017 45.00    • Microalbumin, Urine Rand* 10/16/2017 <0.7    • Micro Alb Creat Ratio 10/16/2017 see below    • 25-Hydroxy   Vitamin D 25 10/17/2017 47    • Sodium 10/17/2017 141    • Potassium 10/17/2017 4.0    • Chloride 10/17/2017 107    • Co2 10/17/2017 26    • Anion Gap 10/17/2017 8.0    •  Glucose 10/17/2017 93    • Bun 10/17/2017 18    • Creatinine 10/17/2017 0.65    • Calcium 10/17/2017 9.2    • AST(SGOT) 10/17/2017 19    • ALT(SGPT) 10/17/2017 19    • Alkaline Phosphatase 10/17/2017 46    • Total Bilirubin 10/17/2017 0.4    • Albumin 10/17/2017 3.8    • Total Protein 10/17/2017 6.8    • Globulin 10/17/2017 3.0    • A-G Ratio 10/17/2017 1.3    • Cholesterol,Tot 10/17/2017 126    • Triglycerides 10/17/2017 104    • HDL 10/17/2017 47    • LDL 10/17/2017 58    • GFR If  10/16/2017 >60    • GFR If Non  Ameri* 10/16/2017 >60       Lab Results   Component Value Date/Time    HBA1C 6.0 (H) 10/16/2017 11:05 AM    HBA1C 5.9 (H) 04/26/2017 09:58 AM     Lab Results   Component Value Date/Time    SODIUM 141 10/16/2017 11:05 AM    POTASSIUM 4.0 10/16/2017 11:05 AM    CHLORIDE 107 10/16/2017 11:05 AM    CO2 26 10/16/2017 11:05 AM    GLUCOSE 93 10/16/2017 11:05 AM    BUN 18 10/16/2017 11:05 AM    CREATININE 0.65 10/16/2017 11:05 AM    ALKPHOSPHAT 46 10/16/2017 11:05 AM    ASTSGOT 19 10/16/2017 11:05 AM    ALTSGPT 19 10/16/2017 11:05 AM    TBILIRUBIN 0.4 10/16/2017 11:05 AM     No results found for: INR  Lab Results   Component Value Date/Time    CHOLSTRLTOT 126 10/16/2017 11:05 AM    LDL 58 10/16/2017 11:05 AM    HDL 47 10/16/2017 11:05 AM    TRIGLYCERIDE 104 10/16/2017 11:05 AM       No results found for: TESTOSTERONE  No results found for: TSH  Lab Results   Component Value Date/Time    FREET4 0.97 09/09/2014 09:25 AM     No results found for: URICACID  No components found for: VITB12  Lab Results   Component Value Date/Time    25HYDROXY 47 10/16/2017 11:05 AM    25HYDROXY 41 04/26/2017 09:58 AM                 Assessment/Plan:     1. Controlled type 2 diabetes mellitus with complication, without long-term current use of insulin (CMS-[facility])    Under good control. Continue same regimen.  - COMP METABOLIC PANEL; Future  - LIPID PROFILE; Future  - CBC WITH DIFFERENTIAL; Future  - HEMOGLOBIN A1C;  Future  - MICROALBUMIN CREAT RATIO URINE; Future    2. Right-sided chest wall pain- NEW PROB- PROB DUE TO RECENT BRONCHITIS AND FORCEFUL COUGHING; REC RICE- XRAY IF NO BETER IN 1 MO        3. Mixed hyperlipidemia     Under good control. Continue same regimen.- TSH; Future  - atorvastatin (LIPITOR) 20 MG Tab; Take 1 Tab by mouth every day.  Dispense: 90 Tab; Refill: 4    4. Essential hypertension  Under good control. Continue same regimen.     - irbesartan (AVAPRO) 300 MG Tab; Take 1 Tab by mouth every day.  Dispense: 90 Tab; Refill: 4    5. Abnormal finding on MRI of brain- osteoma or calcified meningioma frontal bone/lobe- 2016     Under good control. Continue same regimen.    6. Osteoporosis, post-menopausal- since about 2002; on fosamax since early 2000s       Under good control. Continue same regimen.    7. Need for influenza vaccination     - INFLUENZA VACCINE, HIGH DOSE (65+ ONLY)    8. Obesity (BMI 30-39.9)    diet/exercise/lose 15 lbs.; patient counseled    - Patient identified as having weight management issue.  Appropriate orders and counseling given.    9. Vitamin D deficiency     Under good control. Continue same regimen.    10. Osteopenia, unspecified location         Under good control. Continue same regimen.- alendronate (FOSAMAX) 70 MG Tab; Take 1 Tab by mouth every 7 days.  Dispense: 16 Tab; Refill: 4      40 minute face-to-face encounter took place today.  More than half of this time was spent in the coordination of care of the above problems, as well as counseling.

## 2017-11-15 ENCOUNTER — OFFICE VISIT (OUTPATIENT)
Dept: NEUROLOGY | Facility: MEDICAL CENTER | Age: 72
End: 2017-11-15
Payer: MEDICARE

## 2017-11-15 VITALS
OXYGEN SATURATION: 98 % | BODY MASS INDEX: 36.07 KG/M2 | SYSTOLIC BLOOD PRESSURE: 110 MMHG | DIASTOLIC BLOOD PRESSURE: 74 MMHG | HEIGHT: 62 IN | RESPIRATION RATE: 16 BRPM | HEART RATE: 88 BPM | WEIGHT: 196 LBS | TEMPERATURE: 97.3 F

## 2017-11-15 DIAGNOSIS — D32.9 BENIGN MENINGIOMA (HCC): ICD-10-CM

## 2017-11-15 PROCEDURE — 99212 OFFICE O/P EST SF 10 MIN: CPT

## 2017-11-15 NOTE — PROGRESS NOTES
Neurology Progress Note  11/15/2017      Referring MD:  Dr. Ventura  Patient ID:  Name:             Jovita Llanos   YOB: 1945  Age:                 72 y.o.  female   MRN:               [id number]                                              Reason for Consult:      Follow-up: Supposed to meningioma versus osteoma    History of Present Illness:    This 72-year-old lady had a noncontrast head MRI that showed a lesion in the right frontal region that was thought to be either an osteoma or small meningioma. The consult was prompted because she had an abnormal reflex on one side. She has had a contrast-enhanced MRI of the brain which shows an 11 mm meningioma without evidence of compression of the brain or evidence of edema.    Review of Systems:   Constitutional: Denies fevers, Denies weight changes  Eyes: Denies changes in vision, no eye pain  Ears/Nose/Throat/Mouth: Denies nasal congestion or sore throat   Cardiovascular: Denies chest pain, Denies palpitations   Respiratory: Denies shortness of breath , Denies cough  Gastrointestinal/Hepatic: Denies abdominal pain, nausea, vomiting, diarrhea, constipation or GI bleeding   Genitourinary: Denies dysuria or frequency  Musculoskeletal/Rheum: Denies  joint pain and swelling, No edema  Skin: Denies rash  Neurological: Denies headache, confusion, memory loss or focal weakness/parasthesias  Psychiatric: denies mood disorder   Endocrine: Ivon thyroid problems  Heme/Oncology/Lymph Nodes: Denies enlarged lymph nodes, denies brusing or known bleeding disorder  All other systems were reviewed and are negative (AMA/CMS criteria)                Past Medical History:     Past Medical History:   Diagnosis Date   • Allergy    • Diabetes (CMS-HCC)    • Hyperlipidemia    • Hypertension    • Osteoporosis        Problems addressed this visit:    Problem List Items Addressed This Visit     None      Visit Diagnoses     Benign meningioma (CMS-HCC)              Past  "Surgical History:   Past Surgical History:   Procedure Laterality Date   • BREAST BIOPSY  2001   • LUMPECTOMY  2001    right breast CA    • CHOLECYSTECTOMY           Current Outpatient Medications:  Current Outpatient Prescriptions   Medication   • alendronate (FOSAMAX) 70 MG Tab   • atorvastatin (LIPITOR) 20 MG Tab   • irbesartan (AVAPRO) 300 MG Tab   • pioglitazone (ACTOS) 45 MG Tab   • glucosamine Sulfate 500 MG Cap   • Cholecalciferol (VITAMIN D) 2000 UNITS Cap   • aspirin EC (ECOTRIN) 81 MG TBEC   • Calcium Carbonate-Vitamin D (CALCIUM + D PO)     No current facility-administered medications for this visit.        Medication Allergy:  Allergies   Allergen Reactions   • Other Environmental Runny Nose     Pt. Has seasonal allergies with itchy watery eyes.       Family History:  Family History   Problem Relation Age of Onset   • Stroke Mother    • Heart Attack Father        Social History:  Social History     Social History   • Marital status:      Spouse name: N/A   • Number of children: N/A   • Years of education: N/A     Occupational History   • Not on file.     Social History Main Topics   • Smoking status: Never Smoker   • Smokeless tobacco: Never Used   • Alcohol use 1.0 oz/week     1 Glasses of wine, 1 Cans of beer per week      Comment: occasional   • Drug use: No   • Sexual activity: Yes     Other Topics Concern   • Not on file     Social History Narrative   • No narrative on file       Physical Exam:  Vitals:   Vitals:    11/15/17 1437   BP: 110/74   Pulse: 88   Resp: 16   Temp: 36.3 °C (97.3 °F)   SpO2: 98%   Weight: 88.9 kg (196 lb)   Height: 1.575 m (5' 2\")     General Appearance:Her neurological exam is essentially normal at this point.  Weight/BMI: Body mass index is 35.85 kg/m².      Eyes:  Normal optic discs: Yes  Visual field: Normal  Pupillary responses: Normal  Extraocular movement: Normal    Cardiovascular:  Normal carotid pulses bilaterally: Yes  RRR with no MRGs: Yes  No peripheral " edema, pulses intact: Yes    Musculoskeletal:  Normal gait and station: Yes  Normal muscle strength: Yes  Normal muscle tone, no atrophy: Yes  No abnormal movements: Yes    Plan:   I reviewed the images with her and her . I can't see that this small lesion is causing any neurologic problems. I would get another MRI with contrast in a year and if it's unchanged and make imaging every 2 or 3 years. I advised her also presented change in personality change or any motor weakness that she is aware of to call us immediately and we'll arrange for expedited imaging.    Total length of time of this visit was 20 minutes of which greater than 50% was spent counseling the patient/family and coordinating care.    Thank you for allowing me to participate in his care.    Sincerely yours,        Wally Suárez MD, PhD

## 2018-05-01 ENCOUNTER — HOSPITAL ENCOUNTER (OUTPATIENT)
Dept: LAB | Facility: MEDICAL CENTER | Age: 73
End: 2018-05-01
Attending: INTERNAL MEDICINE
Payer: MEDICARE

## 2018-05-01 DIAGNOSIS — E11.8 CONTROLLED TYPE 2 DIABETES MELLITUS WITH COMPLICATION, WITHOUT LONG-TERM CURRENT USE OF INSULIN (HCC): ICD-10-CM

## 2018-05-01 DIAGNOSIS — E78.2 MIXED HYPERLIPIDEMIA: ICD-10-CM

## 2018-05-01 LAB
ALBUMIN SERPL BCP-MCNC: 4 G/DL (ref 3.2–4.9)
ALBUMIN/GLOB SERPL: 1.3 G/DL
ALP SERPL-CCNC: 45 U/L (ref 30–99)
ALT SERPL-CCNC: 23 U/L (ref 2–50)
ANION GAP SERPL CALC-SCNC: 10 MMOL/L (ref 0–11.9)
AST SERPL-CCNC: 20 U/L (ref 12–45)
BASOPHILS # BLD AUTO: 1.2 % (ref 0–1.8)
BASOPHILS # BLD: 0.06 K/UL (ref 0–0.12)
BILIRUB SERPL-MCNC: 0.4 MG/DL (ref 0.1–1.5)
BUN SERPL-MCNC: 19 MG/DL (ref 8–22)
CALCIUM SERPL-MCNC: 9.3 MG/DL (ref 8.5–10.5)
CHLORIDE SERPL-SCNC: 106 MMOL/L (ref 96–112)
CHOLEST SERPL-MCNC: 136 MG/DL (ref 100–199)
CO2 SERPL-SCNC: 26 MMOL/L (ref 20–33)
CREAT SERPL-MCNC: 0.85 MG/DL (ref 0.5–1.4)
CREAT UR-MCNC: 141.5 MG/DL
EOSINOPHIL # BLD AUTO: 0.31 K/UL (ref 0–0.51)
EOSINOPHIL NFR BLD: 6.3 % (ref 0–6.9)
ERYTHROCYTE [DISTWIDTH] IN BLOOD BY AUTOMATED COUNT: 43.6 FL (ref 35.9–50)
EST. AVERAGE GLUCOSE BLD GHB EST-MCNC: 131 MG/DL
GLOBULIN SER CALC-MCNC: 3 G/DL (ref 1.9–3.5)
GLUCOSE SERPL-MCNC: 106 MG/DL (ref 65–99)
HBA1C MFR BLD: 6.2 % (ref 0–5.6)
HCT VFR BLD AUTO: 44.9 % (ref 37–47)
HDLC SERPL-MCNC: 48 MG/DL
HGB BLD-MCNC: 14.6 G/DL (ref 12–16)
IMM GRANULOCYTES # BLD AUTO: 0.01 K/UL (ref 0–0.11)
IMM GRANULOCYTES NFR BLD AUTO: 0.2 % (ref 0–0.9)
LDLC SERPL CALC-MCNC: 66 MG/DL
LYMPHOCYTES # BLD AUTO: 1.34 K/UL (ref 1–4.8)
LYMPHOCYTES NFR BLD: 27.2 % (ref 22–41)
MCH RBC QN AUTO: 28.9 PG (ref 27–33)
MCHC RBC AUTO-ENTMCNC: 32.5 G/DL (ref 33.6–35)
MCV RBC AUTO: 88.9 FL (ref 81.4–97.8)
MICROALBUMIN UR-MCNC: 0.9 MG/DL
MICROALBUMIN/CREAT UR: 6 MG/G (ref 0–30)
MONOCYTES # BLD AUTO: 0.38 K/UL (ref 0–0.85)
MONOCYTES NFR BLD AUTO: 7.7 % (ref 0–13.4)
NEUTROPHILS # BLD AUTO: 2.83 K/UL (ref 2–7.15)
NEUTROPHILS NFR BLD: 57.4 % (ref 44–72)
NRBC # BLD AUTO: 0 K/UL
NRBC BLD-RTO: 0 /100 WBC
PLATELET # BLD AUTO: 179 K/UL (ref 164–446)
PMV BLD AUTO: 12.1 FL (ref 9–12.9)
POTASSIUM SERPL-SCNC: 4 MMOL/L (ref 3.6–5.5)
PROT SERPL-MCNC: 7 G/DL (ref 6–8.2)
RBC # BLD AUTO: 5.05 M/UL (ref 4.2–5.4)
SODIUM SERPL-SCNC: 142 MMOL/L (ref 135–145)
TRIGL SERPL-MCNC: 108 MG/DL (ref 0–149)
TSH SERPL DL<=0.005 MIU/L-ACNC: 3.46 UIU/ML (ref 0.38–5.33)
WBC # BLD AUTO: 4.9 K/UL (ref 4.8–10.8)

## 2018-05-01 PROCEDURE — 85025 COMPLETE CBC W/AUTO DIFF WBC: CPT

## 2018-05-01 PROCEDURE — 84443 ASSAY THYROID STIM HORMONE: CPT

## 2018-05-01 PROCEDURE — 83036 HEMOGLOBIN GLYCOSYLATED A1C: CPT | Mod: GA

## 2018-05-01 PROCEDURE — 80061 LIPID PANEL: CPT

## 2018-05-01 PROCEDURE — 82043 UR ALBUMIN QUANTITATIVE: CPT

## 2018-05-01 PROCEDURE — 36415 COLL VENOUS BLD VENIPUNCTURE: CPT

## 2018-05-01 PROCEDURE — 82570 ASSAY OF URINE CREATININE: CPT

## 2018-05-01 PROCEDURE — 80053 COMPREHEN METABOLIC PANEL: CPT

## 2018-05-23 ENCOUNTER — OFFICE VISIT (OUTPATIENT)
Dept: MEDICAL GROUP | Age: 73
End: 2018-05-23
Payer: MEDICARE

## 2018-05-23 VITALS
HEIGHT: 62 IN | SYSTOLIC BLOOD PRESSURE: 128 MMHG | TEMPERATURE: 99 F | DIASTOLIC BLOOD PRESSURE: 70 MMHG | OXYGEN SATURATION: 96 % | WEIGHT: 201 LBS | BODY MASS INDEX: 36.99 KG/M2 | HEART RATE: 72 BPM

## 2018-05-23 DIAGNOSIS — E55.9 VITAMIN D DEFICIENCY: ICD-10-CM

## 2018-05-23 DIAGNOSIS — E66.9 OBESITY (BMI 30-39.9): ICD-10-CM

## 2018-05-23 DIAGNOSIS — Z85.3 HISTORY OF BREAST CANCER: ICD-10-CM

## 2018-05-23 DIAGNOSIS — D32.9 BENIGN MENINGIOMA (HCC): ICD-10-CM

## 2018-05-23 DIAGNOSIS — E78.2 MIXED HYPERLIPIDEMIA: ICD-10-CM

## 2018-05-23 DIAGNOSIS — M15.9 PRIMARY OSTEOARTHRITIS INVOLVING MULTIPLE JOINTS: ICD-10-CM

## 2018-05-23 DIAGNOSIS — M81.0 OSTEOPOROSIS, POST-MENOPAUSAL: ICD-10-CM

## 2018-05-23 DIAGNOSIS — I10 ESSENTIAL HYPERTENSION: ICD-10-CM

## 2018-05-23 DIAGNOSIS — E11.8 CONTROLLED TYPE 2 DIABETES MELLITUS WITH COMPLICATION, WITHOUT LONG-TERM CURRENT USE OF INSULIN (HCC): ICD-10-CM

## 2018-05-23 PROBLEM — R07.89 RIGHT-SIDED CHEST WALL PAIN: Status: RESOLVED | Noted: 2017-11-10 | Resolved: 2018-05-23

## 2018-05-23 PROBLEM — R90.89 ABNORMAL FINDING ON MRI OF BRAIN: Status: RESOLVED | Noted: 2017-05-09 | Resolved: 2018-05-23

## 2018-05-23 PROCEDURE — 99214 OFFICE O/P EST MOD 30 MIN: CPT | Performed by: INTERNAL MEDICINE

## 2018-05-23 ASSESSMENT — ENCOUNTER SYMPTOMS
NEUROLOGICAL NEGATIVE: 1
GASTROINTESTINAL NEGATIVE: 1
CONSTITUTIONAL NEGATIVE: 1
MUSCULOSKELETAL NEGATIVE: 1
CARDIOVASCULAR NEGATIVE: 1
RESPIRATORY NEGATIVE: 1
EYES NEGATIVE: 1
PSYCHIATRIC NEGATIVE: 1

## 2018-05-23 ASSESSMENT — PATIENT HEALTH QUESTIONNAIRE - PHQ9: CLINICAL INTERPRETATION OF PHQ2 SCORE: 0

## 2018-05-23 NOTE — PROGRESS NOTES
Subjective:      Jovita Llanos is a 72 y.o. female who presents with Follow-Up (patient doing well)  The patient is here for followup of chronic medical problems listed below. The patient is compliant with medications and having no side effects from them. Denies chest pain, abdominal pain, dyspnea, myalgias, or cough.   .prop  Patient Active Problem List    Diagnosis Date Noted   • Benign meningioma (HCC) 05/23/2018   • DDD (degenerative disc disease), lumbar 06/29/2016   • Controlled type 2 diabetes mellitus with complication, without long-term current use of insulin (HCC) 10/27/2015   • Osteoporosis, post-menopausal- since about 2002; on fosamax since early 2000s 09/17/2014   • Mixed hyperlipidemia 08/27/2014   • Essential hypertension 08/27/2014   • Obesity (BMI 30-39.9) 08/27/2014   • Primary osteoarthritis involving multiple joints 08/27/2014   • History of breast cancer- 2001; RT and lumpectomy; right side; neg nodes; tamoxifen x 5 yrs 08/27/2014   • Vitamin D deficiency 08/27/2014     Allergies   Allergen Reactions   • Other Environmental Runny Nose     Pt. Has seasonal allergies with itchy watery eyes.         Outpatient Medications Prior to Visit   Medication Sig Dispense Refill   • alendronate (FOSAMAX) 70 MG Tab Take 1 Tab by mouth every 7 days. 16 Tab 4   • atorvastatin (LIPITOR) 20 MG Tab Take 1 Tab by mouth every day. 90 Tab 4   • irbesartan (AVAPRO) 300 MG Tab Take 1 Tab by mouth every day. 90 Tab 4   • pioglitazone (ACTOS) 45 MG Tab Take 1 Tab by mouth every day. 90 Tab 4   • glucosamine Sulfate 500 MG Cap Take 500 mg by mouth 3 times a day, with meals.     • Cholecalciferol (VITAMIN D) 2000 UNITS Cap Take 1 Cap by mouth every day. 100 Cap 3   • aspirin EC (ECOTRIN) 81 MG TBEC Take 1 Tab by mouth every day. 30 Tab    • Calcium Carbonate-Vitamin D (CALCIUM + D PO) Take  by mouth 2 Times a Day.       No facility-administered medications prior to visit.              HPI    Review of Systems  "  Constitutional: Negative.    HENT: Negative.    Eyes: Negative.    Respiratory: Negative.    Cardiovascular: Negative.    Gastrointestinal: Negative.    Genitourinary: Negative.    Musculoskeletal: Negative.    Skin: Negative.    Neurological: Negative.    Endo/Heme/Allergies: Negative.    Psychiatric/Behavioral: Negative.           Objective:     /70   Pulse 72   Temp 37.2 °C (99 °F)   Ht 1.575 m (5' 2.01\")   Wt 91.2 kg (201 lb)   SpO2 96%   BMI 36.75 kg/m²      Physical Exam   Constitutional: She is oriented to person, place, and time. She appears well-developed and well-nourished. No distress.   HENT:   Head: Normocephalic and atraumatic.   Right Ear: External ear normal.   Left Ear: External ear normal.   Nose: Nose normal.   Mouth/Throat: Oropharynx is clear and moist. No oropharyngeal exudate.   Eyes: Conjunctivae and EOM are normal. Pupils are equal, round, and reactive to light. Right eye exhibits no discharge. Left eye exhibits no discharge. No scleral icterus.   Neck: Normal range of motion. Neck supple. No JVD present. No tracheal deviation present. No thyromegaly present.   Cardiovascular: Normal rate, regular rhythm, normal heart sounds and intact distal pulses.  Exam reveals no gallop and no friction rub.    No murmur heard.  Pulmonary/Chest: Effort normal and breath sounds normal. No stridor. No respiratory distress. She has no wheezes. She has no rales. She exhibits no tenderness.   Abdominal: Soft. Bowel sounds are normal. She exhibits no distension and no mass. There is no tenderness. There is no rebound and no guarding.   Musculoskeletal: Normal range of motion. She exhibits no edema or tenderness.   Lymphadenopathy:     She has no cervical adenopathy.   Neurological: She is alert and oriented to person, place, and time. She has normal reflexes. She displays normal reflexes. No cranial nerve deficit. She exhibits normal muscle tone. Coordination normal.   Skin: Skin is warm and dry. " No rash noted. She is not diaphoretic. No erythema. No pallor.   Psychiatric: She has a normal mood and affect. Her behavior is normal. Judgment and thought content normal.   Vitals reviewed.    Hospital Outpatient Visit on 05/01/2018   Component Date Value   • Sodium 05/01/2018 142    • Potassium 05/01/2018 4.0    • Chloride 05/01/2018 106    • Co2 05/01/2018 26    • Anion Gap 05/01/2018 10.0    • Glucose 05/01/2018 106*   • Bun 05/01/2018 19    • Creatinine 05/01/2018 0.85    • Calcium 05/01/2018 9.3    • AST(SGOT) 05/01/2018 20    • ALT(SGPT) 05/01/2018 23    • Alkaline Phosphatase 05/01/2018 45    • Total Bilirubin 05/01/2018 0.4    • Albumin 05/01/2018 4.0    • Total Protein 05/01/2018 7.0    • Globulin 05/01/2018 3.0    • A-G Ratio 05/01/2018 1.3    • Cholesterol,Tot 05/01/2018 136    • Triglycerides 05/01/2018 108    • HDL 05/01/2018 48    • LDL 05/01/2018 66    • WBC 05/01/2018 4.9    • RBC 05/01/2018 5.05    • Hemoglobin 05/01/2018 14.6    • Hematocrit 05/01/2018 44.9    • MCV 05/01/2018 88.9    • MCH 05/01/2018 28.9    • MCHC 05/01/2018 32.5*   • RDW 05/01/2018 43.6    • Platelet Count 05/01/2018 179    • MPV 05/01/2018 12.1    • Neutrophils-Polys 05/01/2018 57.40    • Lymphocytes 05/01/2018 27.20    • Monocytes 05/01/2018 7.70    • Eosinophils 05/01/2018 6.30    • Basophils 05/01/2018 1.20    • Immature Granulocytes 05/01/2018 0.20    • Nucleated RBC 05/01/2018 0.00    • Neutrophils (Absolute) 05/01/2018 2.83    • Lymphs (Absolute) 05/01/2018 1.34    • Monos (Absolute) 05/01/2018 0.38    • Eos (Absolute) 05/01/2018 0.31    • Baso (Absolute) 05/01/2018 0.06    • Immature Granulocytes (a* 05/01/2018 0.01    • NRBC (Absolute) 05/01/2018 0.00    • Glycohemoglobin 05/01/2018 6.2*   • Est Avg Glucose 05/01/2018 131    • TSH 05/01/2018 3.460    • Creatinine, Urine 05/01/2018 141.50    • Microalbumin, Urine Rand* 05/01/2018 0.9    • Micro Alb Creat Ratio 05/01/2018 6    • GFR If  05/01/2018 >60     • GFR If Non  Ameri* 05/01/2018 >60          Lab Results   Component Value Date/Time    HBA1C 6.2 (H) 05/01/2018 10:15 AM    HBA1C 6.0 (H) 10/16/2017 11:05 AM     Lab Results   Component Value Date/Time    SODIUM 142 05/01/2018 10:15 AM    POTASSIUM 4.0 05/01/2018 10:15 AM    CHLORIDE 106 05/01/2018 10:15 AM    CO2 26 05/01/2018 10:15 AM    GLUCOSE 106 (H) 05/01/2018 10:15 AM    BUN 19 05/01/2018 10:15 AM    CREATININE 0.85 05/01/2018 10:15 AM    ALKPHOSPHAT 45 05/01/2018 10:15 AM    ASTSGOT 20 05/01/2018 10:15 AM    ALTSGPT 23 05/01/2018 10:15 AM    TBILIRUBIN 0.4 05/01/2018 10:15 AM     No results found for: INR  Lab Results   Component Value Date/Time    CHOLSTRLTOT 136 05/01/2018 10:15 AM    LDL 66 05/01/2018 10:15 AM    HDL 48 05/01/2018 10:15 AM    TRIGLYCERIDE 108 05/01/2018 10:15 AM       No results found for: TESTOSTERONE  No results found for: TSH  Lab Results   Component Value Date/Time    FREET4 0.97 09/09/2014 09:25 AM     No results found for: URICACID  No components found for: VITB12  Lab Results   Component Value Date/Time    25HYDROXY 47 10/16/2017 11:05 AM    25HYDROXY 41 04/26/2017 09:58 AM                 Assessment/Plan:     1. Controlled type 2 diabetes mellitus with complication, without long-term current use of insulin (HCC)    Under good control. Continue same regimen.    - COMP METABOLIC PANEL; Future  - LIPID PROFILE; Future  - CBC WITH DIFFERENTIAL; Future  - HEMOGLOBIN A1C; Future  - MICROALBUMIN CREAT RATIO URINE; Future  - Diabetic Monofilament Lower Extremity Exam    2. Essential hypertension      Under good control. Continue same regimen.    3. Obesity (BMI 30-39.9)    diet/exercise/lose 15 lbs.; patient counseled    - Patient identified as having weight management issue.  Appropriate orders and counseling given.    4. Primary osteoarthritis involving multiple joints   Under good control. Continue same regimen.      5. History of breast cancer- 2001; RT and lumpectomy; right  side; neg nodes; tamoxifen x 5 yrs   Under good control. Continue same regimen.      6. Vitamin D deficiency   Under good control. Continue same regimen.    7. Osteoporosis, post-menopausal- since about 2002; on fosamax since early 2000s    Under good control. Continue same regimen.      8. Mixed hyperlipidemia        Under good control. Continue same regimen.      9. Benign meningioma (HCC)    Under good control. Continue same regimen.      30 minute face-to-face encounter took place today.  More than half of this time was spent in the coordination of care of the above problems, as well as counseling.

## 2018-08-20 ENCOUNTER — PATIENT OUTREACH (OUTPATIENT)
Dept: HEALTH INFORMATION MANAGEMENT | Facility: OTHER | Age: 73
End: 2018-08-20

## 2018-08-20 NOTE — PROGRESS NOTES
1. Attempt #:1 pt stated do not call her to update her chart or anything     2. WebIZ Checked & Epic Updated: Yes  3. [facility] Verified: no  4. Verify PCP: yes    5. Communication Preference Obtained: yes    6. Diabetes Visit Scheduling  Scheduling Status:Not Scheduled. not due / SCP      7. Care Gap Scheduling (Attempt to Schedule EACH Overdue Care Gap!)    Health Maintenance Due   Topic Date Due   • RETINAL SCREENING  06/29/2018   • IMM INFLUENZA (1) 09/01/2018        8. Patient was directed to Health and Wellness Website: no     - Patient plans to schedule appointment for Retinal Eye Exam.    9. Screened for [employer] Prescription? yes  10. [employer] Activation: already active  11. [employer] Lorri: no  12. Virtual Visits: no  13. Opt In to Text Messages: yes

## 2018-09-21 ENCOUNTER — HOSPITAL ENCOUNTER (OUTPATIENT)
Dept: RADIOLOGY | Facility: MEDICAL CENTER | Age: 73
End: 2018-09-21
Attending: INTERNAL MEDICINE
Payer: MEDICARE

## 2018-09-21 DIAGNOSIS — Z12.31 SCREENING MAMMOGRAM, ENCOUNTER FOR: ICD-10-CM

## 2018-09-21 PROCEDURE — 77067 SCR MAMMO BI INCL CAD: CPT

## 2018-09-28 ENCOUNTER — OFFICE VISIT (OUTPATIENT)
Dept: MEDICAL GROUP | Facility: LAB | Age: 73
End: 2018-09-28
Payer: MEDICARE

## 2018-09-28 VITALS
SYSTOLIC BLOOD PRESSURE: 128 MMHG | DIASTOLIC BLOOD PRESSURE: 70 MMHG | BODY MASS INDEX: 35.15 KG/M2 | OXYGEN SATURATION: 97 % | WEIGHT: 191 LBS | HEART RATE: 64 BPM | HEIGHT: 62 IN | RESPIRATION RATE: 18 BRPM | TEMPERATURE: 97.1 F

## 2018-09-28 DIAGNOSIS — Z23 NEED FOR VACCINATION: ICD-10-CM

## 2018-09-28 DIAGNOSIS — S81.012D LACERATION OF LEFT KNEE, SUBSEQUENT ENCOUNTER: ICD-10-CM

## 2018-09-28 PROBLEM — S81.012A LACERATION OF LEFT KNEE: Status: ACTIVE | Noted: 2018-09-28

## 2018-09-28 PROCEDURE — G0008 ADMIN INFLUENZA VIRUS VAC: HCPCS | Performed by: INTERNAL MEDICINE

## 2018-09-28 PROCEDURE — 90662 IIV NO PRSV INCREASED AG IM: CPT | Performed by: INTERNAL MEDICINE

## 2018-09-28 PROCEDURE — 99212 OFFICE O/P EST SF 10 MIN: CPT | Mod: 25 | Performed by: INTERNAL MEDICINE

## 2018-09-28 NOTE — PROGRESS NOTES
CC: Stitches removal.    Subjective:     HPI:   Jovita is a pleasant patient of Dr. Ventura who is here today for an acute visit.    Laceration of left knee  Jovita is a pleasant patient of Dr. Ventura who had an incident about 10 days ago while she was in [place] and working on her mother-in-law backyard.  She tripped over an injury to her left knee.  She had a small laceration of her skin without major knee joint injury.  She was seen at an urgent care she had 3 stitches placed.  She continued to keep the area clean and dry, no symptoms or signs of secondary infection.  She was given a Tdap over there.  She was advised to make an appointment for stitches removal and she is here today for stitches removal.      Patient Active Problem List    Diagnosis Date Noted   • Laceration of left knee 09/28/2018   • Benign meningioma (HCC) 05/23/2018   • DDD (degenerative disc disease), lumbar 06/29/2016   • Controlled type 2 diabetes mellitus with complication, without long-term current use of insulin (HCC) 10/27/2015   • Osteoporosis, post-menopausal- since about 2002; on fosamax since early 2000s 09/17/2014   • Mixed hyperlipidemia 08/27/2014   • Essential hypertension 08/27/2014   • Obesity (BMI 30-39.9) 08/27/2014   • Primary osteoarthritis involving multiple joints 08/27/2014   • History of breast cancer- 2001; RT and lumpectomy; right side; neg nodes; tamoxifen x 5 yrs 08/27/2014   • Vitamin D deficiency 08/27/2014       Current Outpatient Prescriptions   Medication Sig Dispense Refill   • alendronate (FOSAMAX) 70 MG Tab Take 1 Tab by mouth every 7 days. 16 Tab 4   • atorvastatin (LIPITOR) 20 MG Tab Take 1 Tab by mouth every day. 90 Tab 4   • irbesartan (AVAPRO) 300 MG Tab Take 1 Tab by mouth every day. 90 Tab 4   • pioglitazone (ACTOS) 45 MG Tab Take 1 Tab by mouth every day. 90 Tab 4   • glucosamine Sulfate 500 MG Cap Take 500 mg by mouth 3 times a day, with meals.     • Cholecalciferol (VITAMIN D) 2000 UNITS Cap Take 1  Cap by mouth every day. 100 Cap 3   • aspirin EC (ECOTRIN) 81 MG TBEC Take 1 Tab by mouth every day. 30 Tab    • Calcium Carbonate-Vitamin D (CALCIUM + D PO) Take  by mouth 2 Times a Day.       No current facility-administered medications for this visit.        Allergies as of 09/28/2018 - Reviewed 09/28/2018   Allergen Reaction Noted   • Other environmental Runny Nose 08/21/2015        Past Medical History:   Diagnosis Date   • Allergy    • Diabetes (HCC)    • Hyperlipidemia    • Hypertension    • Osteoporosis        Past Surgical History:   Procedure Laterality Date   • BREAST BIOPSY  2001   • LUMPECTOMY  2001    right breast CA    • CHOLECYSTECTOMY         Social History   Substance Use Topics   • Smoking status: Never Smoker   • Smokeless tobacco: Never Used   • Alcohol use 1.0 oz/week     1 Glasses of wine, 1 Cans of beer per week      Comment: occasional       Family History   Problem Relation Age of Onset   • Stroke Mother    • Heart Attack Father        ROS:     - Constitutional: Negative for fever, chills, unexpected weight change, and fatigue/generalized weakness.     - HEENT: Negative for headaches, vision changes, hearing changes, ear pain, ear discharge, sinus congestion, or sore throat.     - Respiratory: Negative for cough, sputum production, dyspnea and wheezing.    - Cardiovascular: Negative for chest pain or palpitations.      - Gastrointestinal: Negative for heartburn, nausea, vomiting, abdominal pain, diarrhea or constipation.     - Genitourinary: Negative for dysuria, polyuria or urinary urgency.    - Musculoskeletal: Negative for myalgias, back pain, and joint pain.     - Skin: + Left knee laceration.  Negative for rash, itching, cyanotic skin color change.     - Psychiatric/Behavioral: Negative for depression or suicidal/homicidal ideation.       Physical Exam:     not currently breastfeeding. There is no height or weight on file to calculate BMI.   Gen:         Alert and oriented, No apparent  distress.  Neck:        No Lymphadenopathy or Bruits.  Lungs:     Clear to auscultation bilaterally  CV:          Regular rate and rhythm. No murmurs, rubs or gallops.      Ext:          No clubbing, cyanosis, edema.  Skin:         Left knee with a superficial laceration status post stitches, 3 stitches were removed today.  Wound appeared to be healing as appropriate.  No signs of infection such as redness, erythema or warmth.      Assessment and Plan:     73 y.o. female with the following issues.    1. Need for vaccination  Given today in the office.  - INFLUENZA VACCINE, HIGH DOSE (65+ ONLY)    2. Laceration of left knee, subsequent encounter  New to me, controlled problem.    3 stitches were removed today as indicated above.  Advised local care of her wound.        Follow Up:      Return for PRN with PCP.      Please note that this dictation was created using voice recognition software. I have made every reasonable attempt to correct obvious errors, but I expect that there are errors of grammar and possibly content that I did not discover before finalizing the note.    Signed by: Marilyn Mohr M.D.

## 2018-09-28 NOTE — ASSESSMENT & PLAN NOTE
Jovita is a pleasant patient of Dr. Ventura who had an incident about 10 days ago while she was in [place] and working on her mother-in-law backyard.  She tripped over an injury to her left knee.  She had a small laceration of her skin without major knee joint injury.  She was seen at an urgent care she had 3 stitches placed.  She continued to keep the area clean and dry, no symptoms or signs of secondary infection.  She was given a Tdap over there.  She was advised to make an appointment for stitches removal and she is here today for stitches removal.

## 2018-09-28 NOTE — LETTER
[facility]  Saurabh Ventura M.D.  [address]  [place] [address]  Fax: [phone]   Authorization for Release/Disclosure of   Protected Health Information   Name: JULIAN HALEY : 1945 SSN: [id number]   Address: [address] [place] [address]   [place] [address] Phone:    [phone] (home)    I authorize the entity listed below to release/disclose the PHI below to:   [facility]/Saurabh Ventura M.D. and Marilyn Mohr M.D.   Provider or Entity Name:     Address   City, State, Zip   Phone:      Fax:     Reason for request: continuity of care   Information to be released:    [  ] LAST COLONOSCOPY,  including any PATH REPORT and follow-up  [  ] LAST FIT/COLOGUARD RESULT [  ] LAST DEXA  [  ] LAST MAMMOGRAM  [  ] LAST PAP  [  ] LAST LABS [  ] RETINA EXAM REPORT  [  ] IMMUNIZATION RECORDS  [  ] Release all info      [  ] Check here and initial the line next to each item to release ALL health information INCLUDING  _____ Care and treatment for drug and / or alcohol abuse  _____ HIV testing, infection status, or AIDS  _____ Genetic Testing    DATES OF SERVICE OR TIME PERIOD TO BE DISCLOSED: _____________  I understand and acknowledge that:  * This Authorization may be revoked at any time by you in writing, except if your health information has already been used or disclosed.  * Your health information that will be used or disclosed as a result of you signing this authorization could be re-disclosed by the recipient. If this occurs, your re-disclosed health information may no longer be protected by State or Federal laws.  * You may refuse to sign this Authorization. Your refusal will not affect your ability to obtain treatment.  * This Authorization becomes effective upon signing and will  on (date) __________.      If no date is indicated, this Authorization will  one (1) year from the signature date.    Name: Julian Haley    Signature:   Date:     2018       PLEASE FAX REQUESTED RECORDS  BACK TO: [phone]

## 2019-01-08 ENCOUNTER — HOSPITAL ENCOUNTER (OUTPATIENT)
Dept: LAB | Facility: MEDICAL CENTER | Age: 74
End: 2019-01-08
Attending: INTERNAL MEDICINE
Payer: MEDICARE

## 2019-01-08 DIAGNOSIS — E11.8 CONTROLLED TYPE 2 DIABETES MELLITUS WITH COMPLICATION, WITHOUT LONG-TERM CURRENT USE OF INSULIN (HCC): ICD-10-CM

## 2019-01-08 LAB
ALBUMIN SERPL BCP-MCNC: 3.9 G/DL (ref 3.2–4.9)
ALBUMIN/GLOB SERPL: 1.4 G/DL
ALP SERPL-CCNC: 51 U/L (ref 30–99)
ALT SERPL-CCNC: 23 U/L (ref 2–50)
ANION GAP SERPL CALC-SCNC: 8 MMOL/L (ref 0–11.9)
AST SERPL-CCNC: 19 U/L (ref 12–45)
BASOPHILS # BLD AUTO: 1 % (ref 0–1.8)
BASOPHILS # BLD: 0.04 K/UL (ref 0–0.12)
BILIRUB SERPL-MCNC: 0.5 MG/DL (ref 0.1–1.5)
BUN SERPL-MCNC: 23 MG/DL (ref 8–22)
CALCIUM SERPL-MCNC: 9 MG/DL (ref 8.5–10.5)
CHLORIDE SERPL-SCNC: 107 MMOL/L (ref 96–112)
CHOLEST SERPL-MCNC: 124 MG/DL (ref 100–199)
CO2 SERPL-SCNC: 27 MMOL/L (ref 20–33)
CREAT SERPL-MCNC: 0.81 MG/DL (ref 0.5–1.4)
EOSINOPHIL # BLD AUTO: 0.27 K/UL (ref 0–0.51)
EOSINOPHIL NFR BLD: 6.6 % (ref 0–6.9)
ERYTHROCYTE [DISTWIDTH] IN BLOOD BY AUTOMATED COUNT: 45.9 FL (ref 35.9–50)
EST. AVERAGE GLUCOSE BLD GHB EST-MCNC: 137 MG/DL
FASTING STATUS PATIENT QL REPORTED: NORMAL
GLOBULIN SER CALC-MCNC: 2.7 G/DL (ref 1.9–3.5)
GLUCOSE SERPL-MCNC: 100 MG/DL (ref 65–99)
HBA1C MFR BLD: 6.4 % (ref 0–5.6)
HCT VFR BLD AUTO: 43.6 % (ref 37–47)
HDLC SERPL-MCNC: 48 MG/DL
HGB BLD-MCNC: 13.8 G/DL (ref 12–16)
IMM GRANULOCYTES # BLD AUTO: 0.01 K/UL (ref 0–0.11)
IMM GRANULOCYTES NFR BLD AUTO: 0.2 % (ref 0–0.9)
LDLC SERPL CALC-MCNC: 54 MG/DL
LYMPHOCYTES # BLD AUTO: 1.02 K/UL (ref 1–4.8)
LYMPHOCYTES NFR BLD: 24.9 % (ref 22–41)
MCH RBC QN AUTO: 29.2 PG (ref 27–33)
MCHC RBC AUTO-ENTMCNC: 31.7 G/DL (ref 33.6–35)
MCV RBC AUTO: 92.2 FL (ref 81.4–97.8)
MONOCYTES # BLD AUTO: 0.35 K/UL (ref 0–0.85)
MONOCYTES NFR BLD AUTO: 8.6 % (ref 0–13.4)
NEUTROPHILS # BLD AUTO: 2.4 K/UL (ref 2–7.15)
NEUTROPHILS NFR BLD: 58.7 % (ref 44–72)
NRBC # BLD AUTO: 0 K/UL
NRBC BLD-RTO: 0 /100 WBC
PLATELET # BLD AUTO: 141 K/UL (ref 164–446)
PMV BLD AUTO: 12.7 FL (ref 9–12.9)
POTASSIUM SERPL-SCNC: 4 MMOL/L (ref 3.6–5.5)
PROT SERPL-MCNC: 6.6 G/DL (ref 6–8.2)
RBC # BLD AUTO: 4.73 M/UL (ref 4.2–5.4)
SODIUM SERPL-SCNC: 142 MMOL/L (ref 135–145)
TRIGL SERPL-MCNC: 110 MG/DL (ref 0–149)
WBC # BLD AUTO: 4.1 K/UL (ref 4.8–10.8)

## 2019-01-08 PROCEDURE — 80061 LIPID PANEL: CPT

## 2019-01-08 PROCEDURE — 83036 HEMOGLOBIN GLYCOSYLATED A1C: CPT | Mod: GA

## 2019-01-08 PROCEDURE — 36415 COLL VENOUS BLD VENIPUNCTURE: CPT

## 2019-01-08 PROCEDURE — 82043 UR ALBUMIN QUANTITATIVE: CPT

## 2019-01-08 PROCEDURE — 85025 COMPLETE CBC W/AUTO DIFF WBC: CPT

## 2019-01-08 PROCEDURE — 80053 COMPREHEN METABOLIC PANEL: CPT

## 2019-01-08 PROCEDURE — 82570 ASSAY OF URINE CREATININE: CPT

## 2019-01-09 LAB
CREAT UR-MCNC: 83.4 MG/DL
MICROALBUMIN UR-MCNC: <0.7 MG/DL
MICROALBUMIN/CREAT UR: NORMAL MG/G (ref 0–30)

## 2019-01-30 ENCOUNTER — OFFICE VISIT (OUTPATIENT)
Dept: MEDICAL GROUP | Age: 74
End: 2019-01-30
Payer: MEDICARE

## 2019-01-30 VITALS
WEIGHT: 186.8 LBS | SYSTOLIC BLOOD PRESSURE: 132 MMHG | HEIGHT: 62 IN | TEMPERATURE: 97.6 F | DIASTOLIC BLOOD PRESSURE: 80 MMHG | HEART RATE: 80 BPM | OXYGEN SATURATION: 96 % | BODY MASS INDEX: 34.37 KG/M2

## 2019-01-30 DIAGNOSIS — J06.9 VIRAL URI WITH COUGH: ICD-10-CM

## 2019-01-30 DIAGNOSIS — E78.2 MIXED HYPERLIPIDEMIA: ICD-10-CM

## 2019-01-30 DIAGNOSIS — E66.9 OBESITY (BMI 30-39.9): ICD-10-CM

## 2019-01-30 DIAGNOSIS — E55.9 VITAMIN D DEFICIENCY: ICD-10-CM

## 2019-01-30 DIAGNOSIS — E11.8 CONTROLLED TYPE 2 DIABETES MELLITUS WITH COMPLICATION, WITHOUT LONG-TERM CURRENT USE OF INSULIN (HCC): ICD-10-CM

## 2019-01-30 DIAGNOSIS — I10 ESSENTIAL HYPERTENSION: ICD-10-CM

## 2019-01-30 LAB
FLUAV+FLUBV AG SPEC QL IA: NEGATIVE
INT CON NEG: NEGATIVE
INT CON POS: POSITIVE

## 2019-01-30 PROCEDURE — 87804 INFLUENZA ASSAY W/OPTIC: CPT | Performed by: INTERNAL MEDICINE

## 2019-01-30 PROCEDURE — 99214 OFFICE O/P EST MOD 30 MIN: CPT | Performed by: INTERNAL MEDICINE

## 2019-01-30 RX ORDER — AZITHROMYCIN 250 MG/1
TABLET, FILM COATED ORAL
Qty: 6 TAB | Refills: 1 | Status: SHIPPED | OUTPATIENT
Start: 2019-01-30 | End: 2020-01-28

## 2019-01-30 ASSESSMENT — ENCOUNTER SYMPTOMS
PSYCHIATRIC NEGATIVE: 1
GASTROINTESTINAL NEGATIVE: 1
CHILLS: 1
COUGH: 1
CARDIOVASCULAR NEGATIVE: 1
MUSCULOSKELETAL NEGATIVE: 1
EYES NEGATIVE: 1
NEUROLOGICAL NEGATIVE: 1

## 2019-01-30 ASSESSMENT — PATIENT HEALTH QUESTIONNAIRE - PHQ9: CLINICAL INTERPRETATION OF PHQ2 SCORE: 0

## 2019-01-30 NOTE — PROGRESS NOTES
Subjective:      Jovita Llanos is a 73 y.o. female who presents with Chills (x3 days ); Generalized Body Aches (x3 days ); Runny Nose (x3 days ); and Cough (x3 days )        HPI    The patient is here for followup of chronic medical problems listed below. The patient is compliant with medications and having no side effects from them. Denies chest pain, abdominal pain, dyspnea, myalgias, or cough.    Patient complains of chills, body aches, cough, and nasal congestion for 3 days. She states she is not coughing anything up. She states it has started to improve.    Patient reports history of hypertension well controlled on Irbesartan. Blood pressure today is 132/80.    Patient reports history of Type II diabetes well controlled with Pioglitazone.     Patient Active Problem List   Diagnosis   • Mixed hyperlipidemia   • Essential hypertension   • Obesity (BMI 30-39.9)   • Primary osteoarthritis involving multiple joints   • History of breast cancer- 2001; RT and lumpectomy; right side; neg nodes; tamoxifen x 5 yrs   • Vitamin D deficiency   • Osteoporosis, post-menopausal- since about 2002; on fosamax since early 2000s   • Controlled type 2 diabetes mellitus with complication, without long-term current use of insulin (HCC)   • DDD (degenerative disc disease), lumbar   • Benign meningioma (HCC)   • Laceration of left knee       Outpatient Medications Prior to Visit   Medication Sig Dispense Refill   • alendronate (FOSAMAX) 70 MG Tab Take 1 Tab by mouth every 7 days. 16 Tab 4   • atorvastatin (LIPITOR) 20 MG Tab Take 1 Tab by mouth every day. 90 Tab 4   • irbesartan (AVAPRO) 300 MG Tab Take 1 Tab by mouth every day. 90 Tab 4   • pioglitazone (ACTOS) 45 MG Tab Take 1 Tab by mouth every day. 90 Tab 4   • glucosamine Sulfate 500 MG Cap Take 500 mg by mouth 3 times a day, with meals.     • Cholecalciferol (VITAMIN D) 2000 UNITS Cap Take 1 Cap by mouth every day. 100 Cap 3   • aspirin EC (ECOTRIN) 81 MG TBEC Take 1 Tab by  "mouth every day. 30 Tab    • Calcium Carbonate-Vitamin D (CALCIUM + D PO) Take  by mouth 2 Times a Day.       No facility-administered medications prior to visit.         Allergies   Allergen Reactions   • Other Environmental Runny Nose     Pt. Has seasonal allergies with itchy watery eyes.       Review of Systems   Constitutional: Positive for chills.        Body aches   HENT: Positive for congestion.    Eyes: Negative.    Respiratory: Positive for cough.    Cardiovascular: Negative.    Gastrointestinal: Negative.    Genitourinary: Negative.    Musculoskeletal: Negative.    Skin: Negative.    Neurological: Negative.    Endo/Heme/Allergies: Negative.    Psychiatric/Behavioral: Negative.    All other systems reviewed and are negative.           Objective:     /80 (BP Location: Left arm, Patient Position: Sitting, BP Cuff Size: Adult)   Pulse 80   Temp 36.4 °C (97.6 °F) (Temporal)   Ht 1.575 m (5' 2\")   Wt 84.7 kg (186 lb 12.8 oz)   SpO2 96%   BMI 34.17 kg/m²     Physical Exam   Constitutional: Oriented to person, place, and time. Appears well-developed and well-nourished. No distress.   Head: Normocephalic and atraumatic.   Right Ear: External ear normal.   Left Ear: External ear normal.   Nose: Nose normal.   Mouth/Throat: Oropharynx is clear and moist. No oropharyngeal exudate.   Eyes: Pupils are equal, round, and reactive to light. Conjunctivae and EOM are normal. Right eye exhibits no discharge. Left eye exhibits no discharge. No scleral icterus.   Neck: Normal range of motion. Neck supple. No JVD present. No tracheal deviation present. No thyromegaly present.   Cardiovascular: Normal rate, regular rhythm, normal heart sounds and intact distal pulses.  Exam reveals no gallop and no friction rub.    No murmur heard.  Pulmonary/Chest: Effort normal. No stridor. No respiratory distress. No wheezing or rales. No tenderness.   Abdominal: Soft. Bowel sounds are normal. No distension and no mass. There is no " tenderness. There is no rebound and no guarding. No hernia.   Musculoskeletal: Normal range of motion No edema or tenderness.   Lymphadenopathy: No cervical adenopathy.   Neurological: Alert and oriented to person, place, and time. Normal reflexes. Normal reflexes. No cranial nerve deficit. Normal muscle tone. Coordination normal.   Skin: Skin is warm and dry. No rash noted. Not diaphoretic. No erythema. No pallor.   Psychiatric: Normal mood and affect. Behavior is normal. Judgment and thought content normal.   Nursing note and vitals reviewed.      Lab Results   Component Value Date/Time    HBA1C 6.4 (H) 01/08/2019 10:46 AM    HBA1C 6.2 (H) 05/01/2018 10:15 AM     Lab Results   Component Value Date/Time    SODIUM 142 01/08/2019 10:46 AM    POTASSIUM 4.0 01/08/2019 10:46 AM    CHLORIDE 107 01/08/2019 10:46 AM    CO2 27 01/08/2019 10:46 AM    GLUCOSE 100 (H) 01/08/2019 10:46 AM    BUN 23 (H) 01/08/2019 10:46 AM    CREATININE 0.81 01/08/2019 10:46 AM    ALKPHOSPHAT 51 01/08/2019 10:46 AM    ASTSGOT 19 01/08/2019 10:46 AM    ALTSGPT 23 01/08/2019 10:46 AM    TBILIRUBIN 0.5 01/08/2019 10:46 AM     No results found for: INR  Lab Results   Component Value Date/Time    CHOLSTRLTOT 124 01/08/2019 10:46 AM    LDL 54 01/08/2019 10:46 AM    HDL 48 01/08/2019 10:46 AM    TRIGLYCERIDE 110 01/08/2019 10:46 AM       No results found for: TESTOSTERONE  No results found for: TSH  Lab Results   Component Value Date/Time    FREET4 0.97 09/09/2014 09:25 AM     No results found for: URICACID  No components found for: VITB12  Lab Results   Component Value Date/Time    25HYDROXY 47 10/16/2017 11:05 AM    25HYDROXY 41 04/26/2017 09:58 AM          Assessment/Plan:     1. Viral URI with cough  Influenza negative. Patient counseled on rest and increased fluid intake. If symptoms do not improve, plan to treat with:    - POCT Influenza A/B  - azithromycin (ZITHROMAX) 250 MG Tab; Take 2 tabs today then 1 per day for 4 days  Dispense: 6 Tab;  Refill: 1    2. Mixed hyperlipidemia  Under good control. Continue same regimen of Atorvastatin.    - COMP METABOLIC PANEL; Future  - Lipid Profile; Future  - CBC WITH DIFFERENTIAL; Future    3. Essential hypertension  Under good control. Continue same regimen of Irbesartan.    4. Obesity (BMI 30-39.9)  Patient counseled on diet, exercise, and losing 15 pounds.    5. Vitamin D deficiency  Under good control. Continue same regimen of Vitamin D supplementation.    6. Controlled type 2 diabetes mellitus with complication, without long-term current use of insulin (HCC)  Under good control. Continue same regimen of Actos. Patient counseled on high protein, low carb diet. Plan to evaluate with:    - COMP METABOLIC PANEL; Future  - Lipid Profile; Future  - CBC WITH DIFFERENTIAL; Future  - HEMOGLOBIN A1C; Future  - MICROALBUMIN CREAT RATIO URINE; Future      30 minute face-to-face encounter took place today.  More than half of this time was spent in the coordination of care of the above problems, as well as counseling.     Ronen OCAMPO (Keishae), am scribing for, and in the presence of, Saurabh Ventura M.D..    Electronically signed by: Ronen Gutierrez (Scribe), 1/30/2019    I, Saurabh Ventura M.D., personally performed the services described in this documentation, as scribed by Ronen Gutierrez in my presence, and it is both accurate and complete.

## 2019-02-25 DIAGNOSIS — E78.2 MIXED HYPERLIPIDEMIA: ICD-10-CM

## 2019-02-25 DIAGNOSIS — I10 ESSENTIAL HYPERTENSION: ICD-10-CM

## 2019-02-25 RX ORDER — IRBESARTAN 300 MG/1
300 TABLET ORAL DAILY
Qty: 90 TAB | Refills: 4 | Status: SHIPPED | OUTPATIENT
Start: 2019-02-25 | End: 2019-02-26 | Stop reason: SDUPTHER

## 2019-02-25 RX ORDER — ATORVASTATIN CALCIUM 20 MG/1
20 TABLET, FILM COATED ORAL DAILY
Qty: 90 TAB | Refills: 4 | Status: SHIPPED | OUTPATIENT
Start: 2019-02-25 | End: 2019-02-26 | Stop reason: SDUPTHER

## 2019-02-26 ENCOUNTER — PATIENT MESSAGE (OUTPATIENT)
Dept: MEDICAL GROUP | Age: 74
End: 2019-02-26

## 2019-02-26 DIAGNOSIS — I10 ESSENTIAL HYPERTENSION: ICD-10-CM

## 2019-02-26 DIAGNOSIS — E78.2 MIXED HYPERLIPIDEMIA: ICD-10-CM

## 2019-02-26 RX ORDER — ATORVASTATIN CALCIUM 20 MG/1
20 TABLET, FILM COATED ORAL DAILY
Qty: 90 TAB | Refills: 4 | Status: SHIPPED | OUTPATIENT
Start: 2019-02-26 | End: 2020-01-29 | Stop reason: SDUPTHER

## 2019-02-26 RX ORDER — IRBESARTAN 300 MG/1
300 TABLET ORAL DAILY
Qty: 90 TAB | Refills: 4 | Status: SHIPPED | OUTPATIENT
Start: 2019-02-26 | End: 2019-07-09 | Stop reason: RX

## 2019-02-26 NOTE — PATIENT COMMUNICATION
Was the patient seen in the last year in this department? Yes    Does patient have an active prescription for medications requested? No     Received Request Via: Patient       Patient would like Rx's sent over to     [facility] -  [address] [facility]  [address] [facility]  [address]nd [address]  [facility] [address]  Phone: [phone] Fax: [phone]    Pharmacy updated

## 2019-04-29 DIAGNOSIS — M85.80 OSTEOPENIA, UNSPECIFIED LOCATION: ICD-10-CM

## 2019-04-29 RX ORDER — ALENDRONATE SODIUM 70 MG/1
70 TABLET ORAL
Qty: 16 TAB | Refills: 4 | Status: SHIPPED | OUTPATIENT
Start: 2019-04-29 | End: 2020-01-29

## 2019-04-29 RX ORDER — PIOGLITAZONEHYDROCHLORIDE 45 MG/1
45 TABLET ORAL DAILY
Qty: 90 TAB | Refills: 4 | Status: SHIPPED | OUTPATIENT
Start: 2019-04-29 | End: 2020-01-29 | Stop reason: SDUPTHER

## 2019-07-09 ENCOUNTER — TELEPHONE (OUTPATIENT)
Dept: MEDICAL GROUP | Age: 74
End: 2019-07-09

## 2019-07-09 DIAGNOSIS — I10 ESSENTIAL HYPERTENSION: ICD-10-CM

## 2019-07-09 RX ORDER — LOSARTAN POTASSIUM 100 MG/1
100 TABLET ORAL DAILY
Qty: 90 TAB | Refills: 4 | Status: SHIPPED | OUTPATIENT
Start: 2019-07-09 | End: 2020-01-29

## 2019-07-09 NOTE — TELEPHONE ENCOUNTER
Phone Number Called: [phone] (home)       Call outcome: left message for patient to call back regarding message below    Message: Patient needs to be informed of medication switched.

## 2019-07-09 NOTE — TELEPHONE ENCOUNTER
"1. Caller Name:  [facility] - [place], [place] - [address]  [address]  [address]  [place] [address]  Phone: [phone] Fax: [phone]      Received a fax from the pharmacy stating \"Irbesartan Tab 300MG is  Backordered\".  Pharmacy requesting new Rx be sent instead  "

## 2019-07-16 ENCOUNTER — HOSPITAL ENCOUNTER (OUTPATIENT)
Dept: LAB | Facility: MEDICAL CENTER | Age: 74
End: 2019-07-16
Attending: INTERNAL MEDICINE
Payer: MEDICARE

## 2019-07-16 DIAGNOSIS — E78.2 MIXED HYPERLIPIDEMIA: ICD-10-CM

## 2019-07-16 DIAGNOSIS — E11.8 CONTROLLED TYPE 2 DIABETES MELLITUS WITH COMPLICATION, WITHOUT LONG-TERM CURRENT USE OF INSULIN (HCC): ICD-10-CM

## 2019-07-16 LAB
ALBUMIN SERPL BCP-MCNC: 4 G/DL (ref 3.2–4.9)
ALBUMIN/GLOB SERPL: 1.4 G/DL
ALP SERPL-CCNC: 42 U/L (ref 30–99)
ALT SERPL-CCNC: 25 U/L (ref 2–50)
ANION GAP SERPL CALC-SCNC: 7 MMOL/L (ref 0–11.9)
AST SERPL-CCNC: 21 U/L (ref 12–45)
BASOPHILS # BLD AUTO: 1.1 % (ref 0–1.8)
BASOPHILS # BLD: 0.05 K/UL (ref 0–0.12)
BILIRUB SERPL-MCNC: 0.5 MG/DL (ref 0.1–1.5)
BUN SERPL-MCNC: 24 MG/DL (ref 8–22)
CALCIUM SERPL-MCNC: 9.9 MG/DL (ref 8.5–10.5)
CHLORIDE SERPL-SCNC: 108 MMOL/L (ref 96–112)
CHOLEST SERPL-MCNC: 110 MG/DL (ref 100–199)
CO2 SERPL-SCNC: 28 MMOL/L (ref 20–33)
CREAT SERPL-MCNC: 0.92 MG/DL (ref 0.5–1.4)
CREAT UR-MCNC: 135.9 MG/DL
EOSINOPHIL # BLD AUTO: 0.59 K/UL (ref 0–0.51)
EOSINOPHIL NFR BLD: 13.4 % (ref 0–6.9)
ERYTHROCYTE [DISTWIDTH] IN BLOOD BY AUTOMATED COUNT: 44.4 FL (ref 35.9–50)
EST. AVERAGE GLUCOSE BLD GHB EST-MCNC: 128 MG/DL
GLOBULIN SER CALC-MCNC: 2.8 G/DL (ref 1.9–3.5)
GLUCOSE SERPL-MCNC: 101 MG/DL (ref 65–99)
HBA1C MFR BLD: 6.1 % (ref 0–5.6)
HCT VFR BLD AUTO: 45.3 % (ref 37–47)
HDLC SERPL-MCNC: 48 MG/DL
HGB BLD-MCNC: 13.9 G/DL (ref 12–16)
IMM GRANULOCYTES # BLD AUTO: 0.01 K/UL (ref 0–0.11)
IMM GRANULOCYTES NFR BLD AUTO: 0.2 % (ref 0–0.9)
LDLC SERPL CALC-MCNC: 41 MG/DL
LYMPHOCYTES # BLD AUTO: 1.18 K/UL (ref 1–4.8)
LYMPHOCYTES NFR BLD: 26.9 % (ref 22–41)
MCH RBC QN AUTO: 28.5 PG (ref 27–33)
MCHC RBC AUTO-ENTMCNC: 30.7 G/DL (ref 33.6–35)
MCV RBC AUTO: 92.8 FL (ref 81.4–97.8)
MICROALBUMIN UR-MCNC: <0.7 MG/DL
MICROALBUMIN/CREAT UR: NORMAL MG/G (ref 0–30)
MONOCYTES # BLD AUTO: 0.38 K/UL (ref 0–0.85)
MONOCYTES NFR BLD AUTO: 8.7 % (ref 0–13.4)
NEUTROPHILS # BLD AUTO: 2.18 K/UL (ref 2–7.15)
NEUTROPHILS NFR BLD: 49.7 % (ref 44–72)
NRBC # BLD AUTO: 0 K/UL
NRBC BLD-RTO: 0 /100 WBC
PLATELET # BLD AUTO: 132 K/UL (ref 164–446)
PMV BLD AUTO: 11.8 FL (ref 9–12.9)
POTASSIUM SERPL-SCNC: 4.4 MMOL/L (ref 3.6–5.5)
PROT SERPL-MCNC: 6.8 G/DL (ref 6–8.2)
RBC # BLD AUTO: 4.88 M/UL (ref 4.2–5.4)
SODIUM SERPL-SCNC: 143 MMOL/L (ref 135–145)
TRIGL SERPL-MCNC: 103 MG/DL (ref 0–149)
WBC # BLD AUTO: 4.4 K/UL (ref 4.8–10.8)

## 2019-07-16 PROCEDURE — 80061 LIPID PANEL: CPT

## 2019-07-16 PROCEDURE — 80053 COMPREHEN METABOLIC PANEL: CPT

## 2019-07-16 PROCEDURE — 82570 ASSAY OF URINE CREATININE: CPT

## 2019-07-16 PROCEDURE — 85025 COMPLETE CBC W/AUTO DIFF WBC: CPT

## 2019-07-16 PROCEDURE — 82043 UR ALBUMIN QUANTITATIVE: CPT

## 2019-07-16 PROCEDURE — 83036 HEMOGLOBIN GLYCOSYLATED A1C: CPT | Mod: GA

## 2019-07-16 PROCEDURE — 36415 COLL VENOUS BLD VENIPUNCTURE: CPT

## 2019-07-24 ENCOUNTER — APPOINTMENT (OUTPATIENT)
Dept: RADIOLOGY | Facility: IMAGING CENTER | Age: 74
End: 2019-07-24
Attending: PHYSICIAN ASSISTANT
Payer: MEDICARE

## 2019-07-24 ENCOUNTER — OFFICE VISIT (OUTPATIENT)
Dept: URGENT CARE | Facility: CLINIC | Age: 74
End: 2019-07-24
Payer: MEDICARE

## 2019-07-24 VITALS
SYSTOLIC BLOOD PRESSURE: 124 MMHG | HEART RATE: 80 BPM | DIASTOLIC BLOOD PRESSURE: 78 MMHG | WEIGHT: 185 LBS | OXYGEN SATURATION: 97 % | BODY MASS INDEX: 34.04 KG/M2 | RESPIRATION RATE: 18 BRPM | HEIGHT: 62 IN | TEMPERATURE: 98.6 F

## 2019-07-24 DIAGNOSIS — M79.671 RIGHT FOOT PAIN: ICD-10-CM

## 2019-07-24 DIAGNOSIS — S92.354A NONDISPLACED FRACTURE OF FIFTH METATARSAL BONE, RIGHT FOOT, INITIAL ENCOUNTER FOR CLOSED FRACTURE: ICD-10-CM

## 2019-07-24 PROCEDURE — 99213 OFFICE O/P EST LOW 20 MIN: CPT | Performed by: PHYSICIAN ASSISTANT

## 2019-07-24 PROCEDURE — 73630 X-RAY EXAM OF FOOT: CPT | Mod: TC,RT | Performed by: PHYSICIAN ASSISTANT

## 2019-07-24 ASSESSMENT — PAIN SCALES - GENERAL: PAINLEVEL: 10=SEVERE PAIN

## 2019-07-24 ASSESSMENT — ENCOUNTER SYMPTOMS: FALLS: 1

## 2019-07-24 NOTE — PROGRESS NOTES
Subjective:   Jovita Llanos is a 73 y.o. female who presents today with   Chief Complaint   Patient presents with   • Foot Pain     RIGHT since today AM, twisted foot/ankle while getting out of bed        Foot Problem   This is a new problem. The current episode started today. The problem occurs constantly. The problem has been unchanged. The symptoms are aggravated by walking and standing. She has tried ice for the symptoms. The treatment provided mild relief.   Patient got her foot caught in the comforter that was at the end of her bed today.     PMH:  has a past medical history of Allergy; Diabetes (HCC); Hyperlipidemia; Hypertension; and Osteoporosis.  MEDS:   Current Outpatient Prescriptions:   •  alendronate (FOSAMAX) 70 MG Tab, Take 1 Tab by mouth every 7 days., Disp: 16 Tab, Rfl: 4  •  pioglitazone (ACTOS) 45 MG Tab, Take 1 Tab by mouth every day., Disp: 90 Tab, Rfl: 4  •  atorvastatin (LIPITOR) 20 MG Tab, Take 1 Tab by mouth every day., Disp: 90 Tab, Rfl: 4  •  glucosamine Sulfate 500 MG Cap, Take 500 mg by mouth 3 times a day, with meals., Disp: , Rfl:   •  Cholecalciferol (VITAMIN D) 2000 UNITS Cap, Take 1 Cap by mouth every day., Disp: 100 Cap, Rfl: 3  •  aspirin EC (ECOTRIN) 81 MG TBEC, Take 1 Tab by mouth every day., Disp: 30 Tab, Rfl:   •  Calcium Carbonate-Vitamin D (CALCIUM + D PO), Take  by mouth 2 Times a Day., Disp: , Rfl:   •  losartan (COZAAR) 100 MG Tab, Take 1 Tab by mouth every day. (Patient not taking: Reported on 7/24/2019), Disp: 90 Tab, Rfl: 4  •  azithromycin (ZITHROMAX) 250 MG Tab, Take 2 tabs today then 1 per day for 4 days, Disp: 6 Tab, Rfl: 1  ALLERGIES:   Allergies   Allergen Reactions   • Other Environmental Runny Nose     Pt. Has seasonal allergies with itchy watery eyes.     SURGHX:   Past Surgical History:   Procedure Laterality Date   • BREAST BIOPSY  2001   • LUMPECTOMY  2001    right breast CA    • CHOLECYSTECTOMY       SOCHX:  reports that she has never smoked. She  "has never used smokeless tobacco. She reports that she drinks about 1.0 oz of alcohol per week . She reports that she does not use drugs.  FH: Reviewed with patient, not pertinent to this visit.       Review of Systems   Musculoskeletal: Positive for falls.        Right foot pain        Objective:   /78   Pulse 80   Temp 37 °C (98.6 °F) (Temporal)   Resp 18   Ht 1.575 m (5' 2\")   Wt 83.9 kg (185 lb)   SpO2 97%   Breastfeeding? No   BMI 33.84 kg/m²   Physical Exam   Constitutional: She appears well-developed and well-nourished. No distress.   HENT:   Head: Normocephalic and atraumatic.   Right Ear: Hearing normal.   Left Ear: Hearing normal.   Cardiovascular: Normal rate.    Pulmonary/Chest: Effort normal.   Musculoskeletal:        Feet:    Tenderness to palpation on top of metatarsal bones 2 to 5 on right foot. Neurovascularly intact. Normal distal pulses.    Neurological: She is alert. Coordination normal.   Skin: Skin is warm and dry.   Psychiatric: She has a normal mood and affect.   Nursing note and vitals reviewed.  DX FOOT  FINDINGS:  Bone mineralization is normal.  Nondisplaced transverse fracture through the proximal fifth metatarsal bone is identified. No other fracture or dislocation is noted.  Soft tissue swelling is noted.   Impression       1.  Nondisplaced Garcia fracture of the proximal fifth metatarsal bone is identified.           Assessment/Plan:   Assessment    1. Right foot pain  - DX-FOOT-COMPLETE 3+ RIGHT; Future    2. Nondisplaced fracture of fifth metatarsal bone, right foot, initial encounter for closed fracture  - REFERRAL TO ORTHOPEDICS  Patient to have close follow up with ortho. Discussed with patient she should use crutches and strict non-weight bearing guidelines given until follow up with Ortho. Ice, elevate, ibuprofen.  Differential diagnosis, natural history, supportive care, and indications for immediate follow-up discussed.   Patient given instructions and " understanding of medications and treatment.    If not improving in 3-5 days, F/U with PCP or return to UC if symptoms worsen.    Patient agreeable to plan.      Please note that this dictation was created using voice recognition software. I have made every reasonable attempt to correct obvious errors, but I expect that there are errors of grammar and possibly content that I did not discover before finalizing the note.    Gunnar Johnson PA-C

## 2019-07-29 ENCOUNTER — OFFICE VISIT (OUTPATIENT)
Dept: MEDICAL GROUP | Age: 74
End: 2019-07-29
Payer: MEDICARE

## 2019-07-29 VITALS
DIASTOLIC BLOOD PRESSURE: 70 MMHG | WEIGHT: 186.6 LBS | BODY MASS INDEX: 34.34 KG/M2 | HEART RATE: 71 BPM | HEIGHT: 62 IN | TEMPERATURE: 97.9 F | OXYGEN SATURATION: 97 % | SYSTOLIC BLOOD PRESSURE: 108 MMHG

## 2019-07-29 DIAGNOSIS — E78.2 MIXED HYPERLIPIDEMIA: ICD-10-CM

## 2019-07-29 DIAGNOSIS — M81.0 OSTEOPOROSIS, POST-MENOPAUSAL: ICD-10-CM

## 2019-07-29 DIAGNOSIS — I10 ESSENTIAL HYPERTENSION: ICD-10-CM

## 2019-07-29 DIAGNOSIS — Z78.0 MENOPAUSE: ICD-10-CM

## 2019-07-29 DIAGNOSIS — S92.354D CLOSED NONDISPLACED FRACTURE OF FIFTH METATARSAL BONE OF RIGHT FOOT WITH ROUTINE HEALING: ICD-10-CM

## 2019-07-29 DIAGNOSIS — E55.9 VITAMIN D DEFICIENCY: ICD-10-CM

## 2019-07-29 DIAGNOSIS — E11.8 CONTROLLED TYPE 2 DIABETES MELLITUS WITH COMPLICATION, WITHOUT LONG-TERM CURRENT USE OF INSULIN (HCC): ICD-10-CM

## 2019-07-29 PROBLEM — S81.012A LACERATION OF LEFT KNEE: Status: RESOLVED | Noted: 2018-09-28 | Resolved: 2019-07-29

## 2019-07-29 PROCEDURE — 99214 OFFICE O/P EST MOD 30 MIN: CPT | Performed by: INTERNAL MEDICINE

## 2019-07-29 ASSESSMENT — ENCOUNTER SYMPTOMS
NEUROLOGICAL NEGATIVE: 1
CONSTITUTIONAL NEGATIVE: 1
GASTROINTESTINAL NEGATIVE: 1
CARDIOVASCULAR NEGATIVE: 1
RESPIRATORY NEGATIVE: 1

## 2019-07-29 NOTE — PROGRESS NOTES
Subjective:      Jovita Llanos is a 73 y.o. female who presents with Follow-Up; Diabetes Mellitus; Vitamin D Deficiency; and Hyperlipidemia        HPI  The patient is here for followup of chronic medical problems listed below. The patient is compliant with medications and having no side effects from them. Denies chest pain, abdominal pain, dyspnea, myalgias, or cough.    Controlled type 2 diabetes mellitus with complication, without long-term current use of insulin (HCC)  Patient has a history of Type II Diabetes, currently treated with Pioglitazone 45 mg daily. Hemoglobin A1c was 6.1 on 7/16/19, improved from previous a1c of 6.4 on 1/8/19. Patient denies any hypoglycemia or side effects from current medication.     Essential hypertension  Patient has a history of hypertension, currently treated with Losartan 100 mg daily. Patient presents with BP of 108/70 today. She denies any side effects to Losartan.     Mixed hyperlipidemia  History of hyperlipidemia currently treated with Atorvastatin 20 mg daily. Recent lipid panel on 7/16/19 shows cholesterol levels are under good control.     Osteoporosis, post-menopausal- since about 2002; on fosamax since early 2000s  Menopause  Patient has history of post menopausal osteoporosis, currently on Fosamax 70 mg weekly. Patient denies any side effects.     Vitamin D deficiency  History of low vitamin D. Last vitamin D level was 47 on 10/16/17. Patient is taking Cholecalciferol 2000 units daily.     Closed nondisplaced fracture of fifth metatarsal bone of right foot with routine healing  Patient slipped while getting out of her car 5 days ago. Patient was seen at [facility] and where Right Foot X Ray was notable for Non displaced Garcia fracture of proximal 5th metatarsal. Patient was placed in protective boot at Urgent Care and has an appointment with orthopedics.           Patient Active Problem List   Diagnosis   • Mixed hyperlipidemia   • Essential hypertension   •  Obesity (BMI 30-39.9)   • Primary osteoarthritis involving multiple joints   • History of breast cancer- 2001; RT and lumpectomy; right side; neg nodes; tamoxifen x 5 yrs   • Vitamin D deficiency   • Osteoporosis, post-menopausal- since about 2002; on fosamax since early 2000s   • Controlled type 2 diabetes mellitus with complication, without long-term current use of insulin (HCC)   • DDD (degenerative disc disease), lumbar   • Benign meningioma (HCC)   • Laceration of left knee       Outpatient Medications Prior to Visit   Medication Sig Dispense Refill   • alendronate (FOSAMAX) 70 MG Tab Take 1 Tab by mouth every 7 days. 16 Tab 4   • pioglitazone (ACTOS) 45 MG Tab Take 1 Tab by mouth every day. 90 Tab 4   • atorvastatin (LIPITOR) 20 MG Tab Take 1 Tab by mouth every day. 90 Tab 4   • glucosamine Sulfate 500 MG Cap Take 500 mg by mouth 3 times a day, with meals.     • Cholecalciferol (VITAMIN D) 2000 UNITS Cap Take 1 Cap by mouth every day. 100 Cap 3   • aspirin EC (ECOTRIN) 81 MG TBEC Take 1 Tab by mouth every day. 30 Tab    • Calcium Carbonate-Vitamin D (CALCIUM + D PO) Take  by mouth 2 Times a Day.     • losartan (COZAAR) 100 MG Tab Take 1 Tab by mouth every day. (Patient not taking: Reported on 7/24/2019) 90 Tab 4   • azithromycin (ZITHROMAX) 250 MG Tab Take 2 tabs today then 1 per day for 4 days (Patient not taking: Reported on 7/29/2019) 6 Tab 1     No facility-administered medications prior to visit.         Allergies   Allergen Reactions   • Other Environmental Runny Nose     Pt. Has seasonal allergies with itchy watery eyes.       Review of Systems   Constitutional: Negative.    Respiratory: Negative.    Cardiovascular: Negative.    Gastrointestinal: Negative.    Musculoskeletal:        Positive for right foot pain    Neurological: Negative.    All other systems reviewed and are negative.       Objective:     /70 (BP Location: Right arm, Patient Position: Sitting, BP Cuff Size: Adult)   Pulse 71    "Temp 36.6 °C (97.9 °F) (Temporal)   Ht 1.575 m (5' 2\")   Wt 84.6 kg (186 lb 9.6 oz)   SpO2 97%   BMI 34.13 kg/m²     Physical Exam   Constitutional: Oriented to person, place, and time. Appears well-developed and well-nourished. No distress.   Head: Normocephalic and atraumatic.   Right Ear: External ear normal.   Left Ear: External ear normal.   Nose: Nose normal.   Mouth/Throat: Oropharynx is clear and moist. No oropharyngeal exudate.   Eyes: Pupils are equal, round, and reactive to light. Conjunctivae and EOM are normal. Right eye exhibits no discharge. Left eye exhibits no discharge. No scleral icterus.   Neck: Normal range of motion. Neck supple. No JVD present. No tracheal deviation present. No thyromegaly present.   Cardiovascular: Normal rate, regular rhythm, normal heart sounds and intact distal pulses.  Exam reveals no gallop and no friction rub.    No murmur heard.  Pulmonary/Chest: Effort normal. No stridor. No respiratory distress. No wheezing or rales. No tenderness.   Abdominal: Soft. Bowel sounds are normal. No distension and no mass. There is no tenderness. There is no rebound and no guarding. No hernia.   Musculoskeletal: Normal range of motion No edema or tenderness.   Lymphadenopathy: No cervical adenopathy.   Neurological: Alert and oriented to person, place, and time. Normal reflexes. Normal reflexes. No cranial nerve deficit. Normal muscle tone. Coordination normal.   Skin: Skin is warm and dry. No rash noted. Not diaphoretic. No erythema. No pallor.   Psychiatric: Normal mood and affect. Behavior is normal. Judgment and thought content normal.   Nursing note and vitals reviewed.      Lab Results   Component Value Date/Time    HBA1C 6.1 (H) 07/16/2019 09:42 AM    HBA1C 6.4 (H) 01/08/2019 10:46 AM     Lab Results   Component Value Date/Time    SODIUM 143 07/16/2019 09:42 AM    POTASSIUM 4.4 07/16/2019 09:42 AM    CHLORIDE 108 07/16/2019 09:42 AM    CO2 28 07/16/2019 09:42 AM    GLUCOSE 101 " (H) 07/16/2019 09:42 AM    BUN 24 (H) 07/16/2019 09:42 AM    CREATININE 0.92 07/16/2019 09:42 AM    ALKPHOSPHAT 42 07/16/2019 09:42 AM    ASTSGOT 21 07/16/2019 09:42 AM    ALTSGPT 25 07/16/2019 09:42 AM    TBILIRUBIN 0.5 07/16/2019 09:42 AM     No results found for: INR  Lab Results   Component Value Date/Time    CHOLSTRLTOT 110 07/16/2019 09:42 AM    LDL 41 07/16/2019 09:42 AM    HDL 48 07/16/2019 09:42 AM    TRIGLYCERIDE 103 07/16/2019 09:42 AM       No results found for: TESTOSTERONE  No results found for: TSH  Lab Results   Component Value Date/Time    FREET4 0.97 09/09/2014 09:25 AM     No results found for: URICACID  No components found for: VITB12  Lab Results   Component Value Date/Time    25HYDROXY 47 10/16/2017 11:05 AM    25HYDROXY 41 04/26/2017 09:58 AM       Assessment/Plan:       1. Controlled type 2 diabetes mellitus with complication, without long-term current use of insulin (HCC)  - Chronic problem, under good control with Pioglitazone 45 mg daily. Hemoglobin A1c was 6.1 on 7/16/19, improved from previous A1c of 6.1. Continue with current regimen.   - Counseled to comply with medication and diet. Counseled signs and symptoms of hypoglycemia and management of hypoglycemia. Recommend to have retinal eye exam once a year. Advised to check both feet daily.   - Comp Metabolic Panel; Future  - Lipid Profile; Future  - CBC WITH DIFFERENTIAL; Future  - HEMOGLOBIN A1C; Future    2. Essential hypertension  Chronic problem, well controlled with Losartan 100 mg daily. Patient's BP was 108/70 today. Continue with current regimen of Losartan.   - Discussed to eat low-sodium diet and encouraged to do regular physical exercise.  - Recommend to monitor blood pressure and heart rate at home.     3. Mixed hyperlipidemia  - Chronic problem, well-controlled with Atorvastatin 20 mg daily. Recent lipid panel on 7/16/19 shows cholesterol levels are at goal.   - Reviewed the risks and benefits of treatment and potential  side effects of medication.  - Advised to eat low fat, low carbohydrate and high fiber diet as well as do cardio physical exercise regularly.    - Comp Metabolic Panel; Future  - Lipid Profile; Future    4. Osteoporosis, post-menopausal- since about 2002; on fosamax since early 2000s  - DS-BONE DENSITY STUDY (DEXA); Future    5. Vitamin D deficiency  Under good control. Continue same regimen of Cholecalciferol 2000 units daily.   - VITAMIN D,25 HYDROXY; Future    6. Closed nondisplaced fracture of fifth metatarsal bone of right foot with routine healing  - DS-BONE DENSITY STUDY (DEXA); Future    7. Menopause  - DS-BONE DENSITY STUDY (DEXA); Future       40 minute face-to-face encounter took place today.  More than half of this time was spent in the coordination of care of the above problems, as well as counseling.     Liban OCAMPO (Scribe), am scribing for, and in the presence of, Saurabh Ventura M.D..    Electronically signed by: Liban Goldberg (Terrie), 7/29/2019    IaSurabh M.D., personally performed the services described in this documentation, as scribed by Liban Goldberg in my presence, and it is both accurate and complete.

## 2019-09-23 ENCOUNTER — TELEPHONE (OUTPATIENT)
Dept: MEDICAL GROUP | Age: 74
End: 2019-09-23

## 2019-09-23 NOTE — TELEPHONE ENCOUNTER
Fax Received From:   [id number] - [place] - [address]  [address] [place] [address]  Phone: [phone] Fax: [phone]      Message: Received fax stating that a clarification was needed for a possible duplicate drug therapy. Losartan 100mg tab and Irbesartan 300mg tabs. Followed up with Pharmacy and verified that patient is taking only losartan 100mg tabs.

## 2019-09-27 ENCOUNTER — HOSPITAL ENCOUNTER (OUTPATIENT)
Dept: RADIOLOGY | Facility: MEDICAL CENTER | Age: 74
End: 2019-09-27
Attending: INTERNAL MEDICINE
Payer: MEDICARE

## 2019-09-27 DIAGNOSIS — Z78.0 MENOPAUSE: ICD-10-CM

## 2019-09-27 DIAGNOSIS — S92.354D CLOSED NONDISPLACED FRACTURE OF FIFTH METATARSAL BONE OF RIGHT FOOT WITH ROUTINE HEALING: ICD-10-CM

## 2019-09-27 DIAGNOSIS — Z12.39 SCREENING BREAST EXAMINATION: ICD-10-CM

## 2019-09-27 DIAGNOSIS — M81.0 OSTEOPOROSIS, POST-MENOPAUSAL: ICD-10-CM

## 2019-09-27 PROCEDURE — 77080 DXA BONE DENSITY AXIAL: CPT

## 2019-09-27 PROCEDURE — 77063 BREAST TOMOSYNTHESIS BI: CPT

## 2020-01-21 ENCOUNTER — HOSPITAL ENCOUNTER (OUTPATIENT)
Dept: LAB | Facility: MEDICAL CENTER | Age: 75
End: 2020-01-21
Attending: INTERNAL MEDICINE
Payer: MEDICARE

## 2020-01-21 DIAGNOSIS — E55.9 VITAMIN D DEFICIENCY: ICD-10-CM

## 2020-01-21 DIAGNOSIS — E78.2 MIXED HYPERLIPIDEMIA: ICD-10-CM

## 2020-01-21 DIAGNOSIS — E11.8 CONTROLLED TYPE 2 DIABETES MELLITUS WITH COMPLICATION, WITHOUT LONG-TERM CURRENT USE OF INSULIN (HCC): ICD-10-CM

## 2020-01-21 LAB
ALBUMIN SERPL BCP-MCNC: 4.3 G/DL (ref 3.2–4.9)
ALBUMIN/GLOB SERPL: 1.3 G/DL
ALP SERPL-CCNC: 49 U/L (ref 30–99)
ALT SERPL-CCNC: 22 U/L (ref 2–50)
ANION GAP SERPL CALC-SCNC: 8 MMOL/L (ref 0–11.9)
AST SERPL-CCNC: 21 U/L (ref 12–45)
BASOPHILS # BLD AUTO: 0.7 % (ref 0–1.8)
BASOPHILS # BLD: 0.03 K/UL (ref 0–0.12)
BILIRUB SERPL-MCNC: 0.4 MG/DL (ref 0.1–1.5)
BUN SERPL-MCNC: 24 MG/DL (ref 8–22)
CALCIUM SERPL-MCNC: 9.5 MG/DL (ref 8.5–10.5)
CHLORIDE SERPL-SCNC: 105 MMOL/L (ref 96–112)
CHOLEST SERPL-MCNC: 132 MG/DL (ref 100–199)
CO2 SERPL-SCNC: 28 MMOL/L (ref 20–33)
CREAT SERPL-MCNC: 0.93 MG/DL (ref 0.5–1.4)
EOSINOPHIL # BLD AUTO: 0.26 K/UL (ref 0–0.51)
EOSINOPHIL NFR BLD: 6.1 % (ref 0–6.9)
ERYTHROCYTE [DISTWIDTH] IN BLOOD BY AUTOMATED COUNT: 45.7 FL (ref 35.9–50)
EST. AVERAGE GLUCOSE BLD GHB EST-MCNC: 131 MG/DL
GLOBULIN SER CALC-MCNC: 3.4 G/DL (ref 1.9–3.5)
GLUCOSE SERPL-MCNC: 92 MG/DL (ref 65–99)
HBA1C MFR BLD: 6.2 % (ref 0–5.6)
HCT VFR BLD AUTO: 47.8 % (ref 37–47)
HDLC SERPL-MCNC: 57 MG/DL
HGB BLD-MCNC: 14.9 G/DL (ref 12–16)
IMM GRANULOCYTES # BLD AUTO: 0 K/UL (ref 0–0.11)
IMM GRANULOCYTES NFR BLD AUTO: 0 % (ref 0–0.9)
LDLC SERPL CALC-MCNC: 51 MG/DL
LYMPHOCYTES # BLD AUTO: 0.94 K/UL (ref 1–4.8)
LYMPHOCYTES NFR BLD: 21.9 % (ref 22–41)
MCH RBC QN AUTO: 29 PG (ref 27–33)
MCHC RBC AUTO-ENTMCNC: 31.2 G/DL (ref 33.6–35)
MCV RBC AUTO: 93.2 FL (ref 81.4–97.8)
MONOCYTES # BLD AUTO: 0.38 K/UL (ref 0–0.85)
MONOCYTES NFR BLD AUTO: 8.9 % (ref 0–13.4)
NEUTROPHILS # BLD AUTO: 2.68 K/UL (ref 2–7.15)
NEUTROPHILS NFR BLD: 62.4 % (ref 44–72)
NRBC # BLD AUTO: 0 K/UL
NRBC BLD-RTO: 0 /100 WBC
PLATELET # BLD AUTO: 151 K/UL (ref 164–446)
PMV BLD AUTO: 13.3 FL (ref 9–12.9)
POTASSIUM SERPL-SCNC: 4 MMOL/L (ref 3.6–5.5)
PROT SERPL-MCNC: 7.7 G/DL (ref 6–8.2)
RBC # BLD AUTO: 5.13 M/UL (ref 4.2–5.4)
SODIUM SERPL-SCNC: 141 MMOL/L (ref 135–145)
TRIGL SERPL-MCNC: 120 MG/DL (ref 0–149)
WBC # BLD AUTO: 4.3 K/UL (ref 4.8–10.8)

## 2020-01-21 PROCEDURE — 83036 HEMOGLOBIN GLYCOSYLATED A1C: CPT | Mod: GA

## 2020-01-21 PROCEDURE — 36415 COLL VENOUS BLD VENIPUNCTURE: CPT

## 2020-01-21 PROCEDURE — 82306 VITAMIN D 25 HYDROXY: CPT

## 2020-01-21 PROCEDURE — 85025 COMPLETE CBC W/AUTO DIFF WBC: CPT

## 2020-01-21 PROCEDURE — 80061 LIPID PANEL: CPT

## 2020-01-21 PROCEDURE — 80053 COMPREHEN METABOLIC PANEL: CPT

## 2020-01-22 LAB — 25(OH)D3 SERPL-MCNC: 52 NG/ML (ref 30–100)

## 2020-01-28 PROBLEM — S92.354D CLOSED NONDISPLACED FRACTURE OF FIFTH METATARSAL BONE OF RIGHT FOOT WITH ROUTINE HEALING: Status: RESOLVED | Noted: 2019-07-29 | Resolved: 2020-01-28

## 2020-01-29 ENCOUNTER — OFFICE VISIT (OUTPATIENT)
Dept: MEDICAL GROUP | Age: 75
End: 2020-01-29
Payer: MEDICARE

## 2020-01-29 VITALS
HEIGHT: 62 IN | BODY MASS INDEX: 35.33 KG/M2 | SYSTOLIC BLOOD PRESSURE: 122 MMHG | HEART RATE: 66 BPM | WEIGHT: 192 LBS | RESPIRATION RATE: 16 BRPM | TEMPERATURE: 97.6 F | DIASTOLIC BLOOD PRESSURE: 80 MMHG | OXYGEN SATURATION: 99 %

## 2020-01-29 DIAGNOSIS — E55.9 VITAMIN D DEFICIENCY: ICD-10-CM

## 2020-01-29 DIAGNOSIS — E11.8 CONTROLLED TYPE 2 DIABETES MELLITUS WITH COMPLICATION, WITHOUT LONG-TERM CURRENT USE OF INSULIN (HCC): ICD-10-CM

## 2020-01-29 DIAGNOSIS — E78.2 MIXED HYPERLIPIDEMIA: ICD-10-CM

## 2020-01-29 DIAGNOSIS — M51.36 DDD (DEGENERATIVE DISC DISEASE), LUMBAR: ICD-10-CM

## 2020-01-29 DIAGNOSIS — E66.9 OBESITY (BMI 30-39.9): ICD-10-CM

## 2020-01-29 DIAGNOSIS — M15.9 PRIMARY OSTEOARTHRITIS INVOLVING MULTIPLE JOINTS: ICD-10-CM

## 2020-01-29 DIAGNOSIS — I10 ESSENTIAL HYPERTENSION: ICD-10-CM

## 2020-01-29 DIAGNOSIS — D32.9 BENIGN MENINGIOMA (HCC): ICD-10-CM

## 2020-01-29 PROCEDURE — 99214 OFFICE O/P EST MOD 30 MIN: CPT | Performed by: INTERNAL MEDICINE

## 2020-01-29 RX ORDER — ATORVASTATIN CALCIUM 20 MG/1
20 TABLET, FILM COATED ORAL DAILY
Qty: 90 TAB | Refills: 4 | Status: SHIPPED | OUTPATIENT
Start: 2020-01-29 | End: 2020-03-16 | Stop reason: SDUPTHER

## 2020-01-29 RX ORDER — LOSARTAN POTASSIUM 100 MG/1
100 TABLET ORAL DAILY
Qty: 90 TAB | Refills: 4 | Status: SHIPPED | OUTPATIENT
Start: 2020-01-29 | End: 2020-03-16 | Stop reason: SDUPTHER

## 2020-01-29 RX ORDER — PIOGLITAZONEHYDROCHLORIDE 45 MG/1
45 TABLET ORAL DAILY
Qty: 90 TAB | Refills: 4 | Status: SHIPPED | OUTPATIENT
Start: 2020-01-29 | End: 2020-03-16 | Stop reason: SDUPTHER

## 2020-01-29 RX ORDER — LOSARTAN POTASSIUM 100 MG/1
100 TABLET ORAL DAILY
Qty: 90 TAB | Refills: 4 | Status: SHIPPED | OUTPATIENT
Start: 2020-01-29 | End: 2020-01-29 | Stop reason: SDUPTHER

## 2020-01-29 ASSESSMENT — PATIENT HEALTH QUESTIONNAIRE - PHQ9: CLINICAL INTERPRETATION OF PHQ2 SCORE: 0

## 2020-01-29 NOTE — PROGRESS NOTES
Subjective:      Jovita Llanos is a 74 y.o. female who presents with Diabetes        HPI    The patient is here for followup of chronic medical problems listed below. The patient is compliant with medications and having no side effects from them. Denies chest pain, abdominal pain, dyspnea, myalgias, or cough.    Controlled type 2 diabetes mellitus with complication, without long-term current use of insulin (HCC)  She continues to take pioglitazone 45 mg for diabetes mellitus type 2 without any problems or side effects. Blood work was completed prior to this visit.      Mixed hyperlipidemia  She has been taking atorvastatin 20 mg as prescribed for her dyslipidemia without myalgias or other side effects. Blood work was done before this visit.     Essential hypertension  She takes losartan for her hypertension without any side effects. Blood pressure in the office today is within goal at 122/80.     Vitamin D deficiency  She takes Calcium carbonate-vitamin D.Blood work was completed prior to this visit.       Patient Active Problem List   Diagnosis   • Mixed hyperlipidemia   • Essential hypertension   • Obesity (BMI 30-39.9)   • Primary osteoarthritis involving multiple joints   • History of breast cancer- 2001; RT and lumpectomy; right side; neg nodes; tamoxifen x 5 yrs   • Vitamin D deficiency   • Osteoporosis, post-menopausal- since about 2002; on fosamax since early 2000s   • Controlled type 2 diabetes mellitus with complication, without long-term current use of insulin (HCC)   • DDD (degenerative disc disease), lumbar   • Benign meningioma ([facility])       Outpatient Medications Prior to Visit   Medication Sig Dispense Refill   • glucosamine Sulfate 500 MG Cap Take 500 mg by mouth 3 times a day, with meals.     • Cholecalciferol (VITAMIN D) 2000 UNITS Cap Take 1 Cap by mouth every day. 100 Cap 3   • aspirin EC (ECOTRIN) 81 MG TBEC Take 1 Tab by mouth every day. 30 Tab    • Calcium Carbonate-Vitamin D (CALCIUM + D  "PO) Take  by mouth 2 Times a Day.     • losartan (COZAAR) 100 MG Tab Take 1 Tab by mouth every day. 90 Tab 4   • alendronate (FOSAMAX) 70 MG Tab Take 1 Tab by mouth every 7 days. (Patient not taking: Reported on 1/29/2020) 16 Tab 4   • pioglitazone (ACTOS) 45 MG Tab Take 1 Tab by mouth every day. 90 Tab 4   • atorvastatin (LIPITOR) 20 MG Tab Take 1 Tab by mouth every day. 90 Tab 4   • azithromycin (ZITHROMAX) 250 MG Tab Take 2 tabs today then 1 per day for 4 days (Patient not taking: Reported on 7/29/2019) 6 Tab 1     No facility-administered medications prior to visit.         Allergies   Allergen Reactions   • Other Environmental Runny Nose     Pt. Has seasonal allergies with itchy watery eyes.       Review of Systems   All other systems reviewed and are negative.       Objective:     /80 (BP Location: Left arm, Patient Position: Sitting, BP Cuff Size: Adult)   Pulse 66   Temp 36.4 °C (97.6 °F) (Temporal)   Resp 16   Ht 1.575 m (5' 2\")   Wt 87.1 kg (192 lb)   SpO2 99%   BMI 35.12 kg/m²     Physical Exam   Constitutional: Oriented to person, place, and time. Appears well-developed and well-nourished. No distress.   Head: Normocephalic and atraumatic.   Right Ear: External ear normal.   Left Ear: External ear normal.   Nose: Nose normal.   Mouth/Throat: Oropharynx is clear and moist. No oropharyngeal exudate.   Eyes: Pupils are equal, round, and reactive to light. Conjunctivae and EOM are normal. Right eye exhibits no discharge. Left eye exhibits no discharge. No scleral icterus.   Neck: Normal range of motion. Neck supple. No JVD present. No tracheal deviation present. No thyromegaly present.   Cardiovascular: Normal rate, regular rhythm, normal heart sounds and intact distal pulses.  Exam reveals no gallop and no friction rub.    No murmur heard.  Pulmonary/Chest: Effort normal. No stridor. No respiratory distress. No wheezing or rales. No tenderness.   Abdominal: Soft. Bowel sounds are normal. No " distension and no mass. There is no tenderness. There is no rebound and no guarding. No hernia.   Musculoskeletal: Normal range of motion No edema or tenderness.   Lymphadenopathy: No cervical adenopathy.   Neurological: Alert and oriented to person, place, and time. Normal reflexes. Normal reflexes. No cranial nerve deficit. Normal muscle tone. Coordination normal.   Skin: Skin is warm and dry. No rash noted. Not diaphoretic. No erythema. No pallor.   Psychiatric: Normal mood and affect. Behavior is normal. Judgment and thought content normal.   Nursing note and vitals reviewed.      Lab Results   Component Value Date/Time    HBA1C 6.2 (H) 01/21/2020 10:14 AM    HBA1C 6.1 (H) 07/16/2019 09:42 AM     Lab Results   Component Value Date/Time    SODIUM 141 01/21/2020 10:14 AM    POTASSIUM 4.0 01/21/2020 10:14 AM    CHLORIDE 105 01/21/2020 10:14 AM    CO2 28 01/21/2020 10:14 AM    GLUCOSE 92 01/21/2020 10:14 AM    BUN 24 (H) 01/21/2020 10:14 AM    CREATININE 0.93 01/21/2020 10:14 AM    ALKPHOSPHAT 49 01/21/2020 10:14 AM    ASTSGOT 21 01/21/2020 10:14 AM    ALTSGPT 22 01/21/2020 10:14 AM    TBILIRUBIN 0.4 01/21/2020 10:14 AM     No results found for: INR  Lab Results   Component Value Date/Time    CHOLSTRLTOT 132 01/21/2020 10:14 AM    LDL 51 01/21/2020 10:14 AM    HDL 57 01/21/2020 10:14 AM    TRIGLYCERIDE 120 01/21/2020 10:14 AM       No results found for: TESTOSTERONE  No results found for: TSH  Lab Results   Component Value Date/Time    FREET4 0.97 09/09/2014 09:25 AM     No results found for: URICACID  No components found for: VITB12  Lab Results   Component Value Date/Time    25HYDROXY 52 01/21/2020 10:14 AM    25HYDROXY 47 10/16/2017 11:05 AM          Assessment/Plan:     1. Controlled type 2 diabetes mellitus with complication, without long-term current use of insulin (HCC)  Patient's diabetes is well controlled with an A1C of 6.2. Stable on current medications, and we will continue the current dosages. I advised  her to make diet changes to improve her glucose levels. Advised her to avoid sweets, decrease her carbohydrate intake, exercise regularly and keep a healthy weight. Labs have been ordered for the next follow up visit.    - pioglitazone (ACTOS) 45 MG Tab; Take 1 Tab by mouth every day.  Dispense: 90 Tab; Refill: 4  - Comp Metabolic Panel; Future  - Lipid Profile; Future  - CBC WITH DIFFERENTIAL; Future  - HEMOGLOBIN A1C; Future  - MICROALBUMIN CREAT RATIO URINE; Future    2. Mixed hyperlipidemia  Her lipid panel values are all within goal. Stable on current atorvastatin dosage. I have advised the patient to increase her exercise regimen and avoid fatty foods. Labs have been ordered for the next follow up visit.    - atorvastatin (LIPITOR) 20 MG Tab; Take 1 Tab by mouth every day.  Dispense: 90 Tab; Refill: 4  - Comp Metabolic Panel; Future  - Lipid Profile; Future  - CBC WITH DIFFERENTIAL; Future    3. Essential hypertension  Blood pressure is stable and within goal on current medications. We will continue the current dosages. I have advised her to avoid salty foods and exercise regularly. Labs have been ordered for the next follow up visit.   - losartan (COZAAR) 100 MG Tab; Take 1 Tab by mouth every day.  Dispense: 90 Tab; Refill: 4  - Comp Metabolic Panel; Future  - Lipid Profile; Future  - CBC WITH DIFFERENTIAL; Future    4. Obesity (BMI 30-39.9)  Known history. BMI in office today is 35.12. Patient will continue weight loss efforts through diet and exercise.    5. Primary osteoarthritis involving multiple joints  Known history. The patient followed up with Dr. Sanchez after fracturing her fifth metacarpal of her right food. Dr. Sanchez discontinued the patient's fosamax for 6 months.      6. Vitamin D deficiency  Patient's Vitamin D level is within the target range at 52. Advised to continue current OTC supplementation.    7. DDD (degenerative disc disease), lumbar  Known history. Under good control. Continue  same regimen.     8. Benign meningioma (HCC)  Known history. Under good control. Continue same regimen.         IGutierrez (Terrie), am scribing for, and in the presence of, Saurabh Ventura M.D..    Electronically signed by: Gutierrez Lela (Terrie), 1/29/2020    I, Saurabh Ventura M.D., personally performed the services described in this documentation, as scribed by Gutierrez Leal in my presence, and it is both accurate and complete.

## 2020-03-10 ENCOUNTER — PATIENT MESSAGE (OUTPATIENT)
Dept: MEDICAL GROUP | Age: 75
End: 2020-03-10

## 2020-03-10 DIAGNOSIS — I10 ESSENTIAL HYPERTENSION: ICD-10-CM

## 2020-03-10 DIAGNOSIS — E11.8 CONTROLLED TYPE 2 DIABETES MELLITUS WITH COMPLICATION, WITHOUT LONG-TERM CURRENT USE OF INSULIN (HCC): ICD-10-CM

## 2020-03-10 DIAGNOSIS — E78.2 MIXED HYPERLIPIDEMIA: ICD-10-CM

## 2020-03-10 DIAGNOSIS — E55.9 VITAMIN D DEFICIENCY: ICD-10-CM

## 2020-03-16 RX ORDER — MULTIVIT-MIN/IRON/FOLIC ACID/K 18-600-40
2000 CAPSULE ORAL DAILY
Qty: 100 CAP | Refills: 4 | Status: SHIPPED | OUTPATIENT
Start: 2020-03-16 | End: 2023-01-12 | Stop reason: SDUPTHER

## 2020-03-16 RX ORDER — PIOGLITAZONEHYDROCHLORIDE 45 MG/1
45 TABLET ORAL DAILY
Qty: 90 TAB | Refills: 4 | Status: SHIPPED | OUTPATIENT
Start: 2020-03-16 | End: 2021-03-04

## 2020-03-16 RX ORDER — LOSARTAN POTASSIUM 100 MG/1
100 TABLET ORAL DAILY
Qty: 90 TAB | Refills: 4 | Status: SHIPPED | OUTPATIENT
Start: 2020-03-16 | End: 2021-05-20 | Stop reason: SDUPTHER

## 2020-03-16 RX ORDER — ATORVASTATIN CALCIUM 20 MG/1
20 TABLET, FILM COATED ORAL DAILY
Qty: 90 TAB | Refills: 4 | Status: SHIPPED | OUTPATIENT
Start: 2020-03-16 | End: 2021-05-18 | Stop reason: SDUPTHER

## 2020-07-17 ENCOUNTER — HOSPITAL ENCOUNTER (OUTPATIENT)
Dept: LAB | Facility: MEDICAL CENTER | Age: 75
End: 2020-07-17
Attending: INTERNAL MEDICINE
Payer: MEDICARE

## 2020-07-17 DIAGNOSIS — I10 ESSENTIAL HYPERTENSION: ICD-10-CM

## 2020-07-17 DIAGNOSIS — E78.2 MIXED HYPERLIPIDEMIA: ICD-10-CM

## 2020-07-17 DIAGNOSIS — E11.8 CONTROLLED TYPE 2 DIABETES MELLITUS WITH COMPLICATION, WITHOUT LONG-TERM CURRENT USE OF INSULIN (HCC): ICD-10-CM

## 2020-07-17 LAB
BASOPHILS # BLD AUTO: 0.9 % (ref 0–1.8)
BASOPHILS # BLD: 0.04 K/UL (ref 0–0.12)
EOSINOPHIL # BLD AUTO: 0.36 K/UL (ref 0–0.51)
EOSINOPHIL NFR BLD: 7.9 % (ref 0–6.9)
ERYTHROCYTE [DISTWIDTH] IN BLOOD BY AUTOMATED COUNT: 43 FL (ref 35.9–50)
HCT VFR BLD AUTO: 46.4 % (ref 37–47)
HGB BLD-MCNC: 14.8 G/DL (ref 12–16)
IMM GRANULOCYTES # BLD AUTO: 0.01 K/UL (ref 0–0.11)
IMM GRANULOCYTES NFR BLD AUTO: 0.2 % (ref 0–0.9)
LYMPHOCYTES # BLD AUTO: 1.11 K/UL (ref 1–4.8)
LYMPHOCYTES NFR BLD: 24.4 % (ref 22–41)
MCH RBC QN AUTO: 29.2 PG (ref 27–33)
MCHC RBC AUTO-ENTMCNC: 31.9 G/DL (ref 33.6–35)
MCV RBC AUTO: 91.7 FL (ref 81.4–97.8)
MONOCYTES # BLD AUTO: 0.35 K/UL (ref 0–0.85)
MONOCYTES NFR BLD AUTO: 7.7 % (ref 0–13.4)
NEUTROPHILS # BLD AUTO: 2.68 K/UL (ref 2–7.15)
NEUTROPHILS NFR BLD: 58.9 % (ref 44–72)
NRBC # BLD AUTO: 0 K/UL
NRBC BLD-RTO: 0 /100 WBC
PLATELET # BLD AUTO: 161 K/UL (ref 164–446)
PMV BLD AUTO: 12.8 FL (ref 9–12.9)
RBC # BLD AUTO: 5.06 M/UL (ref 4.2–5.4)
WBC # BLD AUTO: 4.6 K/UL (ref 4.8–10.8)

## 2020-07-17 PROCEDURE — 85025 COMPLETE CBC W/AUTO DIFF WBC: CPT

## 2020-07-17 PROCEDURE — 83036 HEMOGLOBIN GLYCOSYLATED A1C: CPT | Mod: GA

## 2020-07-17 PROCEDURE — 82570 ASSAY OF URINE CREATININE: CPT

## 2020-07-17 PROCEDURE — 80061 LIPID PANEL: CPT

## 2020-07-17 PROCEDURE — 82043 UR ALBUMIN QUANTITATIVE: CPT

## 2020-07-17 PROCEDURE — 36415 COLL VENOUS BLD VENIPUNCTURE: CPT | Mod: GA

## 2020-07-17 PROCEDURE — 80053 COMPREHEN METABOLIC PANEL: CPT

## 2020-07-18 LAB
ALBUMIN SERPL BCP-MCNC: 4.1 G/DL (ref 3.2–4.9)
ALBUMIN/GLOB SERPL: 1.4 G/DL
ALP SERPL-CCNC: 54 U/L (ref 30–99)
ALT SERPL-CCNC: 29 U/L (ref 2–50)
ANION GAP SERPL CALC-SCNC: 13 MMOL/L (ref 7–16)
AST SERPL-CCNC: 19 U/L (ref 12–45)
BILIRUB SERPL-MCNC: 0.4 MG/DL (ref 0.1–1.5)
BUN SERPL-MCNC: 22 MG/DL (ref 8–22)
CALCIUM SERPL-MCNC: 9.6 MG/DL (ref 8.5–10.5)
CHLORIDE SERPL-SCNC: 106 MMOL/L (ref 96–112)
CHOLEST SERPL-MCNC: 124 MG/DL (ref 100–199)
CO2 SERPL-SCNC: 26 MMOL/L (ref 20–33)
CREAT SERPL-MCNC: 0.84 MG/DL (ref 0.5–1.4)
CREAT UR-MCNC: 65.89 MG/DL
EST. AVERAGE GLUCOSE BLD GHB EST-MCNC: 128 MG/DL
FASTING STATUS PATIENT QL REPORTED: NORMAL
GLOBULIN SER CALC-MCNC: 2.9 G/DL (ref 1.9–3.5)
GLUCOSE SERPL-MCNC: 104 MG/DL (ref 65–99)
HBA1C MFR BLD: 6.1 % (ref 0–5.6)
HDLC SERPL-MCNC: 48 MG/DL
LDLC SERPL CALC-MCNC: 52 MG/DL
MICROALBUMIN UR-MCNC: <1.2 MG/DL
MICROALBUMIN/CREAT UR: NORMAL MG/G (ref 0–30)
POTASSIUM SERPL-SCNC: 5 MMOL/L (ref 3.6–5.5)
PROT SERPL-MCNC: 7 G/DL (ref 6–8.2)
SODIUM SERPL-SCNC: 145 MMOL/L (ref 135–145)
TRIGL SERPL-MCNC: 119 MG/DL (ref 0–149)

## 2020-07-29 ENCOUNTER — OFFICE VISIT (OUTPATIENT)
Dept: MEDICAL GROUP | Age: 75
End: 2020-07-29
Payer: MEDICARE

## 2020-07-29 VITALS
HEIGHT: 63 IN | TEMPERATURE: 99.1 F | HEART RATE: 76 BPM | WEIGHT: 197.4 LBS | BODY MASS INDEX: 34.98 KG/M2 | SYSTOLIC BLOOD PRESSURE: 118 MMHG | OXYGEN SATURATION: 96 % | DIASTOLIC BLOOD PRESSURE: 60 MMHG

## 2020-07-29 DIAGNOSIS — Z11.59 NEED FOR HEPATITIS C SCREENING TEST: ICD-10-CM

## 2020-07-29 DIAGNOSIS — E11.8 CONTROLLED TYPE 2 DIABETES MELLITUS WITH COMPLICATION, WITHOUT LONG-TERM CURRENT USE OF INSULIN (HCC): ICD-10-CM

## 2020-07-29 DIAGNOSIS — E78.2 MIXED HYPERLIPIDEMIA: ICD-10-CM

## 2020-07-29 DIAGNOSIS — Z12.31 ENCOUNTER FOR SCREENING MAMMOGRAM FOR MALIGNANT NEOPLASM OF BREAST: ICD-10-CM

## 2020-07-29 DIAGNOSIS — I10 ESSENTIAL HYPERTENSION: ICD-10-CM

## 2020-07-29 DIAGNOSIS — E66.9 OBESITY (BMI 30.0-34.9): ICD-10-CM

## 2020-07-29 DIAGNOSIS — E55.9 HYPOVITAMINOSIS D: ICD-10-CM

## 2020-07-29 PROCEDURE — 99214 OFFICE O/P EST MOD 30 MIN: CPT | Performed by: INTERNAL MEDICINE

## 2020-07-29 ASSESSMENT — FIBROSIS 4 INDEX: FIB4 SCORE: 1.58

## 2020-07-29 NOTE — PROGRESS NOTES
CHIEF COMPLAINT  Chief Complaint   Patient presents with   • Lab Results   • Other     blood pressure check   • Other     mammogram in the fall?     HPI  Jovita Llanos is a 74 y.o. female who presents today for the following     DM2 controlled, no complications  HBA1C 6.1 (H) 07/17/2020 10:13 AM   HBA1C 6.2 (H) 01/21/2020 10:14 AM   HBA1C 6.1 (H) 07/16/2019 09:42 AM     Controlled with actos 45 mg QD  No polydipsia, polyphagia, polyuria.  No abdominal pain, weight loss, fatigue.  Diet/exercise/BMI: As above  FH of DM: father    Hypertension  Meds: Losartan 100 mg QD, taking as prescribed.   Denies:  -  headaches, vision problems, tinnitus.                 -  chest pain/pressure, palpitations, irregular heart beats, exertional, dyspnea, peripheral edema.  Low salt diet: advised  Diet: low donato  Exercise: advised daily  BMI: Body mass index is 34.97 kg/m².  FH of HTN: brother    Hyperlipidemia  Meds:  Atorvastatin 20 mg    - taking daily, as prescribed. No myalgias, muscle cramps or pain.   Diet / Exercise/BMI:  As above  FH: brother     Hypovitaminosis D  The patient had low vitamin D level.  Vitamin D supplement: OTC.    Body mass index is 34.97 kg/m².  Onset: after menopause  Diet: as above  Exercise: as above.  No temperature intolerance. No change in hair/skin quality, BMs.   No HTN, buffalo hump, purple striae, flushing.  FH of obesity: parents    Reviewed PMH, PSH, FH, SH, ALL, HCM/IMM, no changes  Reviewed MEDS, no changes    Patient Active Problem List    Diagnosis Date Noted   • Need for hepatitis C screening test 07/29/2020   • Benign meningioma (HCC) 05/23/2018   • DDD (degenerative disc disease), lumbar 06/29/2016   • Controlled type 2 diabetes mellitus with complication, without long-term current use of insulin (HCC) 10/27/2015   • Osteoporosis, post-menopausal- since about 2002; on fosamax since early 2000s 09/17/2014   • Mixed hyperlipidemia 08/27/2014   • Essential hypertension 08/27/2014   •  Obesity (BMI 30-39.9) 08/27/2014   • Primary osteoarthritis involving multiple joints 08/27/2014   • History of breast cancer- 2001; RT and lumpectomy; right side; neg nodes; tamoxifen x 5 yrs 08/27/2014   • Vitamin D deficiency 08/27/2014     CURRENT MEDICATIONS  Current Outpatient Medications   Medication Sig Dispense Refill   • losartan (COZAAR) 100 MG Tab Take 1 Tab by mouth every day. 90 Tab 4   • pioglitazone (ACTOS) 45 MG Tab Take 1 Tab by mouth every day. 90 Tab 4   • atorvastatin (LIPITOR) 20 MG Tab Take 1 Tab by mouth every day. 90 Tab 4   • Cholecalciferol (VITAMIN D) 50 MCG (2000 UT) Cap Take 1 Cap by mouth every day. 100 Cap 4   • aspirin EC (ECOTRIN) 81 MG Tablet Delayed Response Take 1 Tab by mouth every day. 30 Tab 4   • glucosamine Sulfate 500 MG Cap Take 500 mg by mouth 3 times a day, with meals.     • Calcium Carbonate-Vitamin D (CALCIUM + D PO) Take  by mouth 2 Times a Day.       No current facility-administered medications for this visit.      ALLERGIES  Allergies: Other environmental  PAST MEDICAL HISTORY  Past Medical History:   Diagnosis Date   • Allergy    • Diabetes (HCC)    • Hyperlipidemia    • Hypertension    • Osteoporosis      SURGICAL HISTORY  She  has a past surgical history that includes cholecystectomy; breast biopsy (2001); lumpectomy (2001); and mastectomy (Right).  SOCIAL HISTORY  Social History     Tobacco Use   • Smoking status: Never Smoker   • Smokeless tobacco: Never Used   Substance Use Topics   • Alcohol use: Yes     Alcohol/week: 1.0 oz     Types: 1 Glasses of wine, 1 Cans of beer per week     Comment: occasional   • Drug use: No     Social History     Social History Narrative   • Not on file     FAMILY HISTORY  Family History   Problem Relation Age of Onset   • Stroke Mother    • Hypertension Mother    • Heart Attack Father    • Diabetes Father    • Hypertension Father    • Hyperlipidemia Brother    • Stroke Brother    • Heart Disease Brother      Family Status  "  Relation Name Status   • Mo     • Fa     • Bro  (Not Specified)     ROS   Constitutional: Negative for fever, chills, fatigue.  HENT: Negative for congestion, sore throat.  Eyes: Negative for vision problems.   Respiratory: Negative for cough, shortness of breath.  Cardiovascular: Negative for chest pain, palpitations.   Gastrointestinal: Negative for heartburn, nausea, abdominal pain.   Genitourinary: Negative for dysuria.  Musculoskeletal: Negative for significant myalgia, back and joint pain.   Skin: Negative for rash.   Neuro: Negative for dizziness, weakness and headaches.   Endo/Heme/Allergies: Does not bruise/bleed easily.   Psychiatric/Behavioral: Negative for depression.    PHYSICAL EXAM   Blood Pressure 118/60 (BP Location: Right arm, Patient Position: Sitting, BP Cuff Size: Adult)   Pulse 76   Temperature 37.3 °C (99.1 °F) (Temporal)   Height 1.6 m (5' 3\")   Weight 89.5 kg (197 lb 6.4 oz)   Oxygen Saturation 96%  Body mass index is 34.97 kg/m².  General:  NAD, well appearing  HEENT:   NC/AT, PERRLA, EOMI.  Cardiovascular: unlabored breathing, no peripheral cyanosis or swelling.     Lungs:   no respiratory distress.  Abdomen: non- distended.  Extremities:  No LE swelling.  Skin:  Warm, dry.  No erythema. No rash.   Neurologic: Alert & oriented x 3. CN II-XII grossly intact. No focal deficits.  Psychiatric:  Affect normal, mood normal, judgment normal.    Labs     Labs are reviewed and discussed with a patient  Lab Results   Component Value Date/Time    CHOLSTRLTOT 124 2020 10:13 AM    LDL 52 2020 10:13 AM    HDL 48 2020 10:13 AM    TRIGLYCERIDE 119 2020 10:13 AM       Lab Results   Component Value Date/Time    SODIUM 145 2020 10:13 AM    POTASSIUM 5.0 2020 10:13 AM    CHLORIDE 106 2020 10:13 AM    CO2 26 2020 10:13 AM    GLUCOSE 104 (H) 2020 10:13 AM    BUN 22 2020 10:13 AM    CREATININE 0.84 2020 10:13 AM     Lab " Results   Component Value Date/Time    ALKPHOSPHAT 54 07/17/2020 10:13 AM    ASTSGOT 19 07/17/2020 10:13 AM    ALTSGPT 29 07/17/2020 10:13 AM    TBILIRUBIN 0.4 07/17/2020 10:13 AM      Lab Results   Component Value Date/Time    HBA1C 6.1 (H) 07/17/2020 10:13 AM    HBA1C 6.2 (H) 01/21/2020 10:14 AM    HBA1C 6.1 (H) 07/16/2019 09:42 AM     No results found for: TSH  Lab Results   Component Value Date/Time    FREET4 0.97 09/09/2014 09:25 AM       Lab Results   Component Value Date/Time    WBC 4.6 (L) 07/17/2020 10:13 AM    RBC 5.06 07/17/2020 10:13 AM    HEMOGLOBIN 14.8 07/17/2020 10:13 AM    HEMATOCRIT 46.4 07/17/2020 10:13 AM    MCV 91.7 07/17/2020 10:13 AM    MCH 29.2 07/17/2020 10:13 AM    MCHC 31.9 (L) 07/17/2020 10:13 AM    MPV 12.8 07/17/2020 10:13 AM    NEUTSPOLYS 58.90 07/17/2020 10:13 AM    LYMPHOCYTES 24.40 07/17/2020 10:13 AM    MONOCYTES 7.70 07/17/2020 10:13 AM    EOSINOPHILS 7.90 (H) 07/17/2020 10:13 AM    BASOPHILS 0.90 07/17/2020 10:13 AM      Imaging     None    Assessment and Plan     Jovita Llanos is a 74 y.o. female    1. Controlled type 2 diabetes mellitus with complication, without long-term current use of insulin (HCC)  Controlled, continue with current treatment.  - Comp Metabolic Panel; Future  - HEMOGLOBIN A1C; Future  - MICROALBUMIN CREAT RATIO URINE; Future    2. Essential hypertension  Controlled, continue with current treatment.  - Comp Metabolic Panel; Future    3. Mixed hyperlipidemia  Controlled, continue with current treatment.  - Comp Metabolic Panel; Future  - Lipid Profile; Future    4. Hypovitaminosis D  Advised 2000 units of vitamin D daily, OTC, pending labs  - VITAMIN D,25 HYDROXY; Future    5. Obesity (BMI 30.0-34.9)  - Patient identified as having weight management issue.  Appropriate orders and counseling given.    6. Need for hepatitis C screening test  - HEP C VIRUS ANTIBODY; Future    7.  Encounter for screening mammogram for malignant neoplasm of breast  -  MA-SCREENING MAMMO BILAT W/TOMOSYNTHESIS W/CAD; Future    Counseling:   - Smoking:  Nonsmoker    Followup: Return in about 6 months (around 1/29/2021) for LABS.    All questions are answered.    Please note that this dictation was created using voice recognition software, and that there might be errors of michele and possibly content.

## 2020-12-18 ENCOUNTER — HOSPITAL ENCOUNTER (OUTPATIENT)
Dept: RADIOLOGY | Facility: MEDICAL CENTER | Age: 75
End: 2020-12-18
Attending: INTERNAL MEDICINE
Payer: MEDICARE

## 2020-12-18 DIAGNOSIS — Z12.31 ENCOUNTER FOR SCREENING MAMMOGRAM FOR MALIGNANT NEOPLASM OF BREAST: ICD-10-CM

## 2020-12-18 PROCEDURE — 77067 SCR MAMMO BI INCL CAD: CPT

## 2021-01-15 DIAGNOSIS — Z23 NEED FOR VACCINATION: ICD-10-CM

## 2021-01-21 PROCEDURE — 91300 PFIZER SARS-COV-2 VACCINE: CPT

## 2021-01-21 PROCEDURE — 0001A PFIZER SARS-COV-2 VACCINE: CPT

## 2021-01-22 ENCOUNTER — IMMUNIZATION (OUTPATIENT)
Dept: FAMILY PLANNING/WOMEN'S HEALTH CLINIC | Facility: IMMUNIZATION CENTER | Age: 76
End: 2021-01-22
Payer: MEDICARE

## 2021-01-22 DIAGNOSIS — Z23 ENCOUNTER FOR VACCINATION: Primary | ICD-10-CM

## 2021-01-26 ENCOUNTER — HOSPITAL ENCOUNTER (OUTPATIENT)
Dept: LAB | Facility: MEDICAL CENTER | Age: 76
End: 2021-01-26
Attending: INTERNAL MEDICINE
Payer: MEDICARE

## 2021-01-26 ENCOUNTER — APPOINTMENT (OUTPATIENT)
Dept: FAMILY PLANNING/WOMEN'S HEALTH CLINIC | Facility: IMMUNIZATION CENTER | Age: 76
End: 2021-01-26
Attending: INTERNAL MEDICINE
Payer: MEDICARE

## 2021-01-26 DIAGNOSIS — Z11.59 NEED FOR HEPATITIS C SCREENING TEST: ICD-10-CM

## 2021-01-26 DIAGNOSIS — I10 ESSENTIAL HYPERTENSION: ICD-10-CM

## 2021-01-26 DIAGNOSIS — E78.2 MIXED HYPERLIPIDEMIA: ICD-10-CM

## 2021-01-26 DIAGNOSIS — E11.8 CONTROLLED TYPE 2 DIABETES MELLITUS WITH COMPLICATION, WITHOUT LONG-TERM CURRENT USE OF INSULIN (HCC): ICD-10-CM

## 2021-01-26 DIAGNOSIS — E55.9 HYPOVITAMINOSIS D: ICD-10-CM

## 2021-01-26 DIAGNOSIS — Z23 NEED FOR VACCINATION: ICD-10-CM

## 2021-01-26 LAB
25(OH)D3 SERPL-MCNC: 49 NG/ML (ref 30–100)
ALBUMIN SERPL BCP-MCNC: 4.2 G/DL (ref 3.2–4.9)
ALBUMIN/GLOB SERPL: 1.4 G/DL
ALP SERPL-CCNC: 56 U/L (ref 30–99)
ALT SERPL-CCNC: 25 U/L (ref 2–50)
ANION GAP SERPL CALC-SCNC: 9 MMOL/L (ref 7–16)
AST SERPL-CCNC: 23 U/L (ref 12–45)
BILIRUB SERPL-MCNC: 0.3 MG/DL (ref 0.1–1.5)
BUN SERPL-MCNC: 20 MG/DL (ref 8–22)
CALCIUM SERPL-MCNC: 9.8 MG/DL (ref 8.5–10.5)
CHLORIDE SERPL-SCNC: 103 MMOL/L (ref 96–112)
CHOLEST SERPL-MCNC: 131 MG/DL (ref 100–199)
CO2 SERPL-SCNC: 27 MMOL/L (ref 20–33)
CREAT SERPL-MCNC: 0.74 MG/DL (ref 0.5–1.4)
CREAT UR-MCNC: 117.5 MG/DL
EST. AVERAGE GLUCOSE BLD GHB EST-MCNC: 131 MG/DL
FASTING STATUS PATIENT QL REPORTED: NORMAL
GLOBULIN SER CALC-MCNC: 2.9 G/DL (ref 1.9–3.5)
GLUCOSE SERPL-MCNC: 105 MG/DL (ref 65–99)
HBA1C MFR BLD: 6.2 % (ref 0–5.6)
HCV AB SER QL: NORMAL
HDLC SERPL-MCNC: 45 MG/DL
LDLC SERPL CALC-MCNC: 59 MG/DL
MICROALBUMIN UR-MCNC: <1.2 MG/DL
MICROALBUMIN/CREAT UR: NORMAL MG/G (ref 0–30)
POTASSIUM SERPL-SCNC: 4.2 MMOL/L (ref 3.6–5.5)
PROT SERPL-MCNC: 7.1 G/DL (ref 6–8.2)
SODIUM SERPL-SCNC: 139 MMOL/L (ref 135–145)
TRIGL SERPL-MCNC: 137 MG/DL (ref 0–149)

## 2021-01-26 PROCEDURE — 80053 COMPREHEN METABOLIC PANEL: CPT

## 2021-01-26 PROCEDURE — 82306 VITAMIN D 25 HYDROXY: CPT

## 2021-01-26 PROCEDURE — 86803 HEPATITIS C AB TEST: CPT

## 2021-01-26 PROCEDURE — 82043 UR ALBUMIN QUANTITATIVE: CPT

## 2021-01-26 PROCEDURE — 36415 COLL VENOUS BLD VENIPUNCTURE: CPT

## 2021-01-26 PROCEDURE — 80061 LIPID PANEL: CPT

## 2021-01-26 PROCEDURE — 82570 ASSAY OF URINE CREATININE: CPT

## 2021-01-26 PROCEDURE — 83036 HEMOGLOBIN GLYCOSYLATED A1C: CPT | Mod: GA

## 2021-02-11 ENCOUNTER — IMMUNIZATION (OUTPATIENT)
Dept: FAMILY PLANNING/WOMEN'S HEALTH CLINIC | Facility: IMMUNIZATION CENTER | Age: 76
End: 2021-02-11
Attending: INTERNAL MEDICINE
Payer: MEDICARE

## 2021-02-11 DIAGNOSIS — Z23 ENCOUNTER FOR VACCINATION: Primary | ICD-10-CM

## 2021-02-11 PROCEDURE — 0002A PFIZER SARS-COV-2 VACCINE: CPT

## 2021-02-11 PROCEDURE — 91300 PFIZER SARS-COV-2 VACCINE: CPT

## 2021-03-04 ENCOUNTER — OFFICE VISIT (OUTPATIENT)
Dept: MEDICAL GROUP | Age: 76
End: 2021-03-04
Payer: MEDICARE

## 2021-03-04 VITALS
HEIGHT: 63 IN | TEMPERATURE: 97.4 F | OXYGEN SATURATION: 97 % | WEIGHT: 202.4 LBS | SYSTOLIC BLOOD PRESSURE: 140 MMHG | BODY MASS INDEX: 35.86 KG/M2 | DIASTOLIC BLOOD PRESSURE: 90 MMHG | HEART RATE: 74 BPM

## 2021-03-04 DIAGNOSIS — E11.8 CONTROLLED TYPE 2 DIABETES MELLITUS WITH COMPLICATION, WITHOUT LONG-TERM CURRENT USE OF INSULIN (HCC): ICD-10-CM

## 2021-03-04 DIAGNOSIS — E78.2 MIXED HYPERLIPIDEMIA: ICD-10-CM

## 2021-03-04 DIAGNOSIS — I10 ESSENTIAL HYPERTENSION: ICD-10-CM

## 2021-03-04 DIAGNOSIS — E55.9 HYPOVITAMINOSIS D: ICD-10-CM

## 2021-03-04 PROCEDURE — 99214 OFFICE O/P EST MOD 30 MIN: CPT | Performed by: INTERNAL MEDICINE

## 2021-03-04 ASSESSMENT — ENCOUNTER SYMPTOMS
RESPIRATORY NEGATIVE: 1
NEUROLOGICAL NEGATIVE: 1
EYES NEGATIVE: 1
CONSTITUTIONAL NEGATIVE: 1
PSYCHIATRIC NEGATIVE: 1
CARDIOVASCULAR NEGATIVE: 1
MUSCULOSKELETAL NEGATIVE: 1
GASTROINTESTINAL NEGATIVE: 1

## 2021-03-04 ASSESSMENT — FIBROSIS 4 INDEX: FIB4 SCORE: 2.085714285714285714

## 2021-03-04 ASSESSMENT — PATIENT HEALTH QUESTIONNAIRE - PHQ9: CLINICAL INTERPRETATION OF PHQ2 SCORE: 0

## 2021-03-05 NOTE — PROGRESS NOTES
Subjective:      Pooja Llanos is a 75 y.o. female who presents with Diabetes (follow up with lab review )  The patient is here for followup of chronic medical problems listed below. The patient is compliant with medications and having no side effects from them. Denies chest pain, abdominal pain, dyspnea, myalgias, or cough.   Patient Active Problem List    Diagnosis Date Noted   • Need for hepatitis C screening test 07/29/2020   • Benign meningioma (HCC) 05/23/2018   • DDD (degenerative disc disease), lumbar 06/29/2016   • Controlled type 2 diabetes mellitus with complication, without long-term current use of insulin (HCC) 10/27/2015   • Osteoporosis, post-menopausal- since about 2002; on fosamax since early 2000s 09/17/2014   • Mixed hyperlipidemia 08/27/2014   • Essential hypertension 08/27/2014   • Obesity (BMI 30-39.9) 08/27/2014   • Primary osteoarthritis involving multiple joints 08/27/2014   • History of breast cancer- 2001; RT and lumpectomy; right side; neg nodes; tamoxifen x 5 yrs 08/27/2014   • Vitamin D deficiency 08/27/2014     Allergies   Allergen Reactions   • Other Environmental Runny Nose     Pt. Has seasonal allergies with itchy watery eyes.     Outpatient Medications Prior to Visit   Medication Sig Dispense Refill   • losartan (COZAAR) 100 MG Tab Take 1 Tab by mouth every day. 90 Tab 4   • atorvastatin (LIPITOR) 20 MG Tab Take 1 Tab by mouth every day. 90 Tab 4   • Cholecalciferol (VITAMIN D) 50 MCG (2000 UT) Cap Take 1 Cap by mouth every day. 100 Cap 4   • glucosamine Sulfate 500 MG Cap Take 500 mg by mouth 3 times a day, with meals.     • Calcium Carbonate-Vitamin D (CALCIUM + D PO) Take  by mouth 2 Times a Day.     • aspirin EC (ECOTRIN) 81 MG Tablet Delayed Response Take 1 Tab by mouth every day. (Patient not taking: Reported on 3/4/2021) 30 Tab 4   • pioglitazone (ACTOS) 45 MG Tab Take 1 Tab by mouth every day. 90 Tab 4     No facility-administered medications prior to visit.            "    HPI    Review of Systems   Constitutional: Negative.    HENT: Negative.    Eyes: Negative.    Respiratory: Negative.    Cardiovascular: Negative.    Gastrointestinal: Negative.    Genitourinary: Negative.    Musculoskeletal: Negative.    Skin: Negative.    Neurological: Negative.    Endo/Heme/Allergies: Negative.    Psychiatric/Behavioral: Negative.           Objective:     /90 (BP Location: Left arm, Patient Position: Sitting, BP Cuff Size: Adult)   Pulse 74   Temp 36.3 °C (97.4 °F) (Temporal)   Ht 1.6 m (5' 3\")   Wt 91.8 kg (202 lb 6.4 oz)   SpO2 97%   BMI 35.85 kg/m²      Physical Exam  Vitals reviewed.   Constitutional:       General: She is not in acute distress.     Appearance: She is well-developed. She is not diaphoretic.   HENT:      Head: Normocephalic and atraumatic.      Right Ear: External ear normal.      Left Ear: External ear normal.      Nose: Nose normal.      Mouth/Throat:      Pharynx: No oropharyngeal exudate.   Eyes:      General: No scleral icterus.        Right eye: No discharge.         Left eye: No discharge.      Conjunctiva/sclera: Conjunctivae normal.      Pupils: Pupils are equal, round, and reactive to light.   Neck:      Thyroid: No thyromegaly.      Vascular: No JVD.      Trachea: No tracheal deviation.   Cardiovascular:      Rate and Rhythm: Normal rate and regular rhythm.      Heart sounds: Normal heart sounds. No murmur. No friction rub. No gallop.    Pulmonary:      Effort: Pulmonary effort is normal. No respiratory distress.      Breath sounds: Normal breath sounds. No stridor. No wheezing or rales.   Chest:      Chest wall: No tenderness.   Abdominal:      General: Bowel sounds are normal. There is no distension.      Palpations: Abdomen is soft. There is no mass.      Tenderness: There is no abdominal tenderness. There is no guarding or rebound.   Musculoskeletal:         General: No tenderness. Normal range of motion.      Cervical back: Normal range of motion " and neck supple.   Lymphadenopathy:      Cervical: No cervical adenopathy.   Skin:     General: Skin is warm and dry.      Coloration: Skin is not pale.      Findings: No erythema or rash.   Neurological:      Mental Status: She is alert and oriented to person, place, and time.      Cranial Nerves: No cranial nerve deficit.      Motor: No abnormal muscle tone.      Coordination: Coordination normal.      Deep Tendon Reflexes: Reflexes are normal and symmetric. Reflexes normal.   Psychiatric:         Behavior: Behavior normal.         Thought Content: Thought content normal.         Judgment: Judgment normal.            No visits with results within 1 Month(s) from this visit.   Latest known visit with results is:   Hospital Outpatient Visit on 01/26/2021   Component Date Value   • Hepatitis C Antibody 01/26/2021 Non-Reactive    • 25-Hydroxy   Vitamin D 25 01/26/2021 49    • Cholesterol,Tot 01/26/2021 131    • Triglycerides 01/26/2021 137    • HDL 01/26/2021 45    • LDL 01/26/2021 59    • Creatinine, Urine 01/26/2021 117.50    • Microalbumin, Urine Rand* 01/26/2021 <1.2    • Micro Alb Creat Ratio 01/26/2021 see below    • Glycohemoglobin 01/26/2021 6.2*   • Est Avg Glucose 01/26/2021 131    • Sodium 01/26/2021 139    • Potassium 01/26/2021 4.2    • Chloride 01/26/2021 103    • Co2 01/26/2021 27    • Anion Gap 01/26/2021 9.0    • Glucose 01/26/2021 105*   • Bun 01/26/2021 20    • Creatinine 01/26/2021 0.74    • Calcium 01/26/2021 9.8    • AST(SGOT) 01/26/2021 23    • ALT(SGPT) 01/26/2021 25    • Alkaline Phosphatase 01/26/2021 56    • Total Bilirubin 01/26/2021 0.3    • Albumin 01/26/2021 4.2    • Total Protein 01/26/2021 7.1    • Globulin 01/26/2021 2.9    • A-G Ratio 01/26/2021 1.4    • Fasting Status 01/26/2021 Fasting    • GFR If  01/26/2021 >60    • GFR If Non  Ameri* 01/26/2021 >60       Lab Results   Component Value Date/Time    HBA1C 6.2 (H) 01/26/2021 10:50 AM    HBA1C 6.1 (H)  07/17/2020 10:13 AM     Lab Results   Component Value Date/Time    SODIUM 139 01/26/2021 10:50 AM    POTASSIUM 4.2 01/26/2021 10:50 AM    CHLORIDE 103 01/26/2021 10:50 AM    CO2 27 01/26/2021 10:50 AM    GLUCOSE 105 (H) 01/26/2021 10:50 AM    BUN 20 01/26/2021 10:50 AM    CREATININE 0.74 01/26/2021 10:50 AM    ALKPHOSPHAT 56 01/26/2021 10:50 AM    ASTSGOT 23 01/26/2021 10:50 AM    ALTSGPT 25 01/26/2021 10:50 AM    TBILIRUBIN 0.3 01/26/2021 10:50 AM     No results found for: INR  Lab Results   Component Value Date/Time    CHOLSTRLTOT 131 01/26/2021 10:50 AM    LDL 59 01/26/2021 10:50 AM    HDL 45 01/26/2021 10:50 AM    TRIGLYCERIDE 137 01/26/2021 10:50 AM       No results found for: TESTOSTERONE  No results found for: TSH  Lab Results   Component Value Date/Time    FREET4 0.97 09/09/2014 09:25 AM     No results found for: URICACID  No components found for: VITB12  Lab Results   Component Value Date/Time    25HYDROXY 49 01/26/2021 10:50 AM    25HYDROXY 52 01/21/2020 10:14 AM   '     Assessment/Plan:        1. Controlled type 2 diabetes mellitus with complication, without long-term current use of insulin (HCC)         .goo    - metFORMIN (GLUCOPHAGE) 500 MG Tab; Take 1 tablet by mouth 2 times a day, with meals.  Dispense: 180 tablet; Refill: 4  - TSH; Future  - Comp Metabolic Panel; Future  - Lipid Profile; Future  - CBC WITH DIFFERENTIAL; Future  - HEMOGLOBIN A1C; Future  - MICROALBUMIN CREAT RATIO URINE; Future  - VITAMIN D,25 HYDROXY; Future  - Diabetic Monofilament Lower Extremity Exam  - REFERRAL TO OPHTHALMOLOGY    2. Hypovitaminosis D  .gopo  Under good control. Continue same regimen.     - VITAMIN D,25 HYDROXY; Future    3. Mixed hyperlipidemia  Under good control. Continue same regimen.     - TSH; Future  - Comp Metabolic Panel; Future  - Lipid Profile; Future  - CBC WITH DIFFERENTIAL; Future  - HEMOGLOBIN A1C; Future  - MICROALBUMIN CREAT RATIO URINE; Future  - VITAMIN D,25 HYDROXY; Future    4. Essential  hypertension     Under good control. Continue same regimen.

## 2021-05-18 DIAGNOSIS — E78.2 MIXED HYPERLIPIDEMIA: ICD-10-CM

## 2021-05-18 RX ORDER — ATORVASTATIN CALCIUM 20 MG/1
20 TABLET, FILM COATED ORAL DAILY
Qty: 90 TABLET | Refills: 4 | Status: SHIPPED | OUTPATIENT
Start: 2021-05-18 | End: 2022-03-08 | Stop reason: SDUPTHER

## 2021-05-20 DIAGNOSIS — I10 ESSENTIAL HYPERTENSION: ICD-10-CM

## 2021-05-21 RX ORDER — LOSARTAN POTASSIUM 100 MG/1
100 TABLET ORAL DAILY
Qty: 90 TABLET | Refills: 4 | Status: SHIPPED | OUTPATIENT
Start: 2021-05-21 | End: 2022-03-08 | Stop reason: SDUPTHER

## 2021-08-25 ENCOUNTER — HOSPITAL ENCOUNTER (OUTPATIENT)
Dept: LAB | Facility: MEDICAL CENTER | Age: 76
End: 2021-08-25
Attending: INTERNAL MEDICINE
Payer: MEDICARE

## 2021-08-25 DIAGNOSIS — E11.8 CONTROLLED TYPE 2 DIABETES MELLITUS WITH COMPLICATION, WITHOUT LONG-TERM CURRENT USE OF INSULIN (HCC): ICD-10-CM

## 2021-08-25 DIAGNOSIS — E78.2 MIXED HYPERLIPIDEMIA: ICD-10-CM

## 2021-08-25 DIAGNOSIS — E55.9 HYPOVITAMINOSIS D: ICD-10-CM

## 2021-08-25 LAB
25(OH)D3 SERPL-MCNC: 55 NG/ML (ref 30–100)
ALBUMIN SERPL BCP-MCNC: 4 G/DL (ref 3.2–4.9)
ALBUMIN/GLOB SERPL: 1.4 G/DL
ALP SERPL-CCNC: 68 U/L (ref 30–99)
ALT SERPL-CCNC: 28 U/L (ref 2–50)
ANION GAP SERPL CALC-SCNC: 11 MMOL/L (ref 7–16)
AST SERPL-CCNC: 20 U/L (ref 12–45)
BASOPHILS # BLD AUTO: 0.9 % (ref 0–1.8)
BASOPHILS # BLD: 0.05 K/UL (ref 0–0.12)
BILIRUB SERPL-MCNC: 0.4 MG/DL (ref 0.1–1.5)
BUN SERPL-MCNC: 17 MG/DL (ref 8–22)
CALCIUM SERPL-MCNC: 8.9 MG/DL (ref 8.5–10.5)
CHLORIDE SERPL-SCNC: 105 MMOL/L (ref 96–112)
CHOLEST SERPL-MCNC: 131 MG/DL (ref 100–199)
CO2 SERPL-SCNC: 25 MMOL/L (ref 20–33)
CREAT SERPL-MCNC: 0.81 MG/DL (ref 0.5–1.4)
CREAT UR-MCNC: 40.09 MG/DL
EOSINOPHIL # BLD AUTO: 0.45 K/UL (ref 0–0.51)
EOSINOPHIL NFR BLD: 7.9 % (ref 0–6.9)
ERYTHROCYTE [DISTWIDTH] IN BLOOD BY AUTOMATED COUNT: 41.4 FL (ref 35.9–50)
EST. AVERAGE GLUCOSE BLD GHB EST-MCNC: 151 MG/DL
FASTING STATUS PATIENT QL REPORTED: NORMAL
GLOBULIN SER CALC-MCNC: 2.9 G/DL (ref 1.9–3.5)
GLUCOSE SERPL-MCNC: 142 MG/DL (ref 65–99)
HBA1C MFR BLD: 6.9 % (ref 4–5.6)
HCT VFR BLD AUTO: 46.2 % (ref 37–47)
HDLC SERPL-MCNC: 39 MG/DL
HGB BLD-MCNC: 14.7 G/DL (ref 12–16)
IMM GRANULOCYTES # BLD AUTO: 0.01 K/UL (ref 0–0.11)
IMM GRANULOCYTES NFR BLD AUTO: 0.2 % (ref 0–0.9)
LDLC SERPL CALC-MCNC: 56 MG/DL
LYMPHOCYTES # BLD AUTO: 1.13 K/UL (ref 1–4.8)
LYMPHOCYTES NFR BLD: 19.7 % (ref 22–41)
MCH RBC QN AUTO: 28.7 PG (ref 27–33)
MCHC RBC AUTO-ENTMCNC: 31.8 G/DL (ref 33.6–35)
MCV RBC AUTO: 90.1 FL (ref 81.4–97.8)
MICROALBUMIN UR-MCNC: <1.2 MG/DL
MICROALBUMIN/CREAT UR: NORMAL MG/G (ref 0–30)
MONOCYTES # BLD AUTO: 0.51 K/UL (ref 0–0.85)
MONOCYTES NFR BLD AUTO: 8.9 % (ref 0–13.4)
NEUTROPHILS # BLD AUTO: 3.58 K/UL (ref 2–7.15)
NEUTROPHILS NFR BLD: 62.4 % (ref 44–72)
NRBC # BLD AUTO: 0 K/UL
NRBC BLD-RTO: 0 /100 WBC
PLATELET # BLD AUTO: 150 K/UL (ref 164–446)
PMV BLD AUTO: 12.6 FL (ref 9–12.9)
POTASSIUM SERPL-SCNC: 4.4 MMOL/L (ref 3.6–5.5)
PROT SERPL-MCNC: 6.9 G/DL (ref 6–8.2)
RBC # BLD AUTO: 5.13 M/UL (ref 4.2–5.4)
SODIUM SERPL-SCNC: 141 MMOL/L (ref 135–145)
TRIGL SERPL-MCNC: 182 MG/DL (ref 0–149)
TSH SERPL DL<=0.005 MIU/L-ACNC: 3.38 UIU/ML (ref 0.38–5.33)
WBC # BLD AUTO: 5.7 K/UL (ref 4.8–10.8)

## 2021-08-25 PROCEDURE — 84443 ASSAY THYROID STIM HORMONE: CPT

## 2021-08-25 PROCEDURE — 80053 COMPREHEN METABOLIC PANEL: CPT

## 2021-08-25 PROCEDURE — 36415 COLL VENOUS BLD VENIPUNCTURE: CPT

## 2021-08-25 PROCEDURE — 82306 VITAMIN D 25 HYDROXY: CPT

## 2021-08-25 PROCEDURE — 83036 HEMOGLOBIN GLYCOSYLATED A1C: CPT | Mod: GA

## 2021-08-25 PROCEDURE — 82570 ASSAY OF URINE CREATININE: CPT

## 2021-08-25 PROCEDURE — 82043 UR ALBUMIN QUANTITATIVE: CPT

## 2021-08-25 PROCEDURE — 85025 COMPLETE CBC W/AUTO DIFF WBC: CPT

## 2021-08-25 PROCEDURE — 80061 LIPID PANEL: CPT

## 2021-09-08 ENCOUNTER — OFFICE VISIT (OUTPATIENT)
Dept: MEDICAL GROUP | Age: 76
End: 2021-09-08
Payer: MEDICARE

## 2021-09-08 VITALS
BODY MASS INDEX: 34.52 KG/M2 | OXYGEN SATURATION: 95 % | HEIGHT: 63 IN | HEART RATE: 69 BPM | SYSTOLIC BLOOD PRESSURE: 122 MMHG | TEMPERATURE: 98.1 F | WEIGHT: 194.8 LBS | DIASTOLIC BLOOD PRESSURE: 68 MMHG

## 2021-09-08 DIAGNOSIS — E78.2 MIXED HYPERLIPIDEMIA: ICD-10-CM

## 2021-09-08 DIAGNOSIS — Z11.59 NEED FOR HEPATITIS C SCREENING TEST: ICD-10-CM

## 2021-09-08 DIAGNOSIS — E66.01 CLASS 2 SEVERE OBESITY DUE TO EXCESS CALORIES WITH SERIOUS COMORBIDITY AND BODY MASS INDEX (BMI) OF 35.0 TO 35.9 IN ADULT (HCC): ICD-10-CM

## 2021-09-08 DIAGNOSIS — Z23 NEED FOR VACCINATION: ICD-10-CM

## 2021-09-08 DIAGNOSIS — I10 ESSENTIAL HYPERTENSION: ICD-10-CM

## 2021-09-08 DIAGNOSIS — E11.8 CONTROLLED TYPE 2 DIABETES MELLITUS WITH COMPLICATION, WITHOUT LONG-TERM CURRENT USE OF INSULIN (HCC): ICD-10-CM

## 2021-09-08 PROCEDURE — 99214 OFFICE O/P EST MOD 30 MIN: CPT | Performed by: INTERNAL MEDICINE

## 2021-09-08 ASSESSMENT — ENCOUNTER SYMPTOMS
NEUROLOGICAL NEGATIVE: 1
GASTROINTESTINAL NEGATIVE: 1
CARDIOVASCULAR NEGATIVE: 1
MUSCULOSKELETAL NEGATIVE: 1
RESPIRATORY NEGATIVE: 1
CONSTITUTIONAL NEGATIVE: 1
PSYCHIATRIC NEGATIVE: 1
EYES NEGATIVE: 1

## 2021-09-08 ASSESSMENT — FIBROSIS 4 INDEX: FIB4 SCORE: 1.84

## 2021-09-08 NOTE — PROGRESS NOTES
Subjective     Pooja Llanos is a 75 y.o. female who presents with Follow-Up (6mon.) and Lab Results  The patient is here for followup of chronic medical problems listed below. The patient is compliant with medications and having no side effects from them. Denies chest pain, abdominal pain, dyspnea, myalgias, or cough.   Patient Active Problem List    Diagnosis Date Noted   • Need for hepatitis C screening test 07/29/2020   • Benign meningioma (HCC) 05/23/2018   • DDD (degenerative disc disease), lumbar 06/29/2016   • Controlled type 2 diabetes mellitus with complication, without long-term current use of insulin ([facility]) 10/27/2015   • Osteoporosis, post-menopausal- since about 2002; on fosamax since early 2000s 09/17/2014   • Mixed hyperlipidemia 08/27/2014   • Essential hypertension 08/27/2014   • Class 2 severe obesity due to excess calories with serious comorbidity and body mass index (BMI) of 35.0 to 35.9 in adult ([facility]) 08/27/2014   • Primary osteoarthritis involving multiple joints 08/27/2014   • History of breast cancer- 2001; RT and lumpectomy; right side; neg nodes; tamoxifen x 5 yrs 08/27/2014   • Vitamin D deficiency 08/27/2014     Other environmental  Outpatient Medications Prior to Visit   Medication Sig Dispense Refill   • losartan (COZAAR) 100 MG Tab Take 1 tablet by mouth every day. 90 tablet 4   • atorvastatin (LIPITOR) 20 MG Tab Take 1 tablet by mouth every day. 90 tablet 4   • Cholecalciferol (VITAMIN D) 50 MCG (2000 UT) Cap Take 1 Cap by mouth every day. 100 Cap 4   • aspirin EC (ECOTRIN) 81 MG Tablet Delayed Response Take 1 Tab by mouth every day. 30 Tab 4   • glucosamine Sulfate 500 MG Cap Take 500 mg by mouth 3 times a day, with meals.     • Calcium Carbonate-Vitamin D (CALCIUM + D PO) Take  by mouth 2 Times a Day.     • metFORMIN (GLUCOPHAGE) 500 MG Tab Take 1 tablet by mouth 2 times a day, with meals. 180 tablet 4     No facility-administered medications prior to visit.            "    HPI    Review of Systems   Constitutional: Negative.    HENT: Negative.    Eyes: Negative.    Respiratory: Negative.    Cardiovascular: Negative.    Gastrointestinal: Negative.    Genitourinary: Negative.    Musculoskeletal: Negative.    Skin: Negative.    Neurological: Negative.    Endo/Heme/Allergies: Negative.    Psychiatric/Behavioral: Negative.               Objective     /68 (BP Location: Right arm, Patient Position: Sitting, BP Cuff Size: Adult)   Pulse 69   Temp 36.7 °C (98.1 °F) (Temporal)   Ht 1.6 m (5' 3\")   Wt 88.4 kg (194 lb 12.8 oz)   SpO2 95%   BMI 34.51 kg/m²      Physical Exam  Vitals reviewed.   Constitutional:       General: She is not in acute distress.     Appearance: She is well-developed. She is not diaphoretic.   HENT:      Head: Normocephalic and atraumatic.      Right Ear: External ear normal.      Left Ear: External ear normal.      Nose: Nose normal.      Mouth/Throat:      Pharynx: No oropharyngeal exudate.   Eyes:      General: No scleral icterus.        Right eye: No discharge.         Left eye: No discharge.      Conjunctiva/sclera: Conjunctivae normal.      Pupils: Pupils are equal, round, and reactive to light.   Neck:      Thyroid: No thyromegaly.      Vascular: No JVD.      Trachea: No tracheal deviation.   Cardiovascular:      Rate and Rhythm: Normal rate and regular rhythm.      Heart sounds: Normal heart sounds. No murmur heard.   No friction rub. No gallop.    Pulmonary:      Effort: Pulmonary effort is normal. No respiratory distress.      Breath sounds: Normal breath sounds. No stridor. No wheezing or rales.   Chest:      Chest wall: No tenderness.   Abdominal:      General: Bowel sounds are normal. There is no distension.      Palpations: Abdomen is soft. There is no mass.      Tenderness: There is no abdominal tenderness. There is no guarding or rebound.   Musculoskeletal:         General: No tenderness. Normal range of motion.      Cervical back: Normal " range of motion and neck supple.   Lymphadenopathy:      Cervical: No cervical adenopathy.   Skin:     General: Skin is warm and dry.      Coloration: Skin is not pale.      Findings: No erythema or rash.   Neurological:      Mental Status: She is alert and oriented to person, place, and time.      Cranial Nerves: No cranial nerve deficit.      Motor: No abnormal muscle tone.      Coordination: Coordination normal.      Deep Tendon Reflexes: Reflexes are normal and symmetric. Reflexes normal.   Psychiatric:         Behavior: Behavior normal.         Thought Content: Thought content normal.         Judgment: Judgment normal.                             Assessment & Plan        1. Controlled type 2 diabetes mellitus with complication, without long-term current use of insulin (HCC).  Not at goal.  Increase Metformin to 500 mg twice daily and recheck in 6 months.  Continue other medications unchanged.  - REFERRAL FOR RETINAL SCREENING EXAM; Future    2. Mixed hyperlipidemia   Under good control. Continue same regimen.      3. Essential hypertension      Under good control. Continue same regimen.    4. Class 2 severe obesity due to excess calories with serious comorbidity and body mass index (BMI) of 35.0 to 35.9 in adult ([facility])   diet/exercise/lose 15 lbs.; patient counseled       5. Need for hepatitis C screening test  - January 2021    6. Need for vaccination-we will do at pharmacy.         - Tdap Vaccine =>10YO IM

## 2022-02-07 ENCOUNTER — HOSPITAL ENCOUNTER (OUTPATIENT)
Dept: RADIOLOGY | Facility: MEDICAL CENTER | Age: 77
End: 2022-02-07
Attending: INTERNAL MEDICINE
Payer: MEDICARE

## 2022-02-07 DIAGNOSIS — Z12.31 VISIT FOR SCREENING MAMMOGRAM: ICD-10-CM

## 2022-02-07 PROCEDURE — 77063 BREAST TOMOSYNTHESIS BI: CPT

## 2022-03-01 ENCOUNTER — HOSPITAL ENCOUNTER (OUTPATIENT)
Dept: LAB | Facility: MEDICAL CENTER | Age: 77
End: 2022-03-01
Attending: INTERNAL MEDICINE
Payer: MEDICARE

## 2022-03-01 DIAGNOSIS — E78.2 MIXED HYPERLIPIDEMIA: ICD-10-CM

## 2022-03-01 DIAGNOSIS — E11.8 CONTROLLED TYPE 2 DIABETES MELLITUS WITH COMPLICATION, WITHOUT LONG-TERM CURRENT USE OF INSULIN (HCC): ICD-10-CM

## 2022-03-01 LAB
ALBUMIN SERPL BCP-MCNC: 4.3 G/DL (ref 3.2–4.9)
ALBUMIN/GLOB SERPL: 1.6 G/DL
ALP SERPL-CCNC: 64 U/L (ref 30–99)
ALT SERPL-CCNC: 24 U/L (ref 2–50)
ANION GAP SERPL CALC-SCNC: 12 MMOL/L (ref 7–16)
AST SERPL-CCNC: 17 U/L (ref 12–45)
BASOPHILS # BLD AUTO: 0.9 % (ref 0–1.8)
BASOPHILS # BLD: 0.05 K/UL (ref 0–0.12)
BILIRUB SERPL-MCNC: 0.4 MG/DL (ref 0.1–1.5)
BUN SERPL-MCNC: 21 MG/DL (ref 8–22)
CALCIUM SERPL-MCNC: 9.3 MG/DL (ref 8.5–10.5)
CHLORIDE SERPL-SCNC: 106 MMOL/L (ref 96–112)
CHOLEST SERPL-MCNC: 122 MG/DL (ref 100–199)
CO2 SERPL-SCNC: 24 MMOL/L (ref 20–33)
CREAT SERPL-MCNC: 0.86 MG/DL (ref 0.5–1.4)
EOSINOPHIL # BLD AUTO: 0.72 K/UL (ref 0–0.51)
EOSINOPHIL NFR BLD: 12.7 % (ref 0–6.9)
ERYTHROCYTE [DISTWIDTH] IN BLOOD BY AUTOMATED COUNT: 39.8 FL (ref 35.9–50)
EST. AVERAGE GLUCOSE BLD GHB EST-MCNC: 151 MG/DL
FASTING STATUS PATIENT QL REPORTED: NORMAL
GLOBULIN SER CALC-MCNC: 2.7 G/DL (ref 1.9–3.5)
GLUCOSE SERPL-MCNC: 134 MG/DL (ref 65–99)
HBA1C MFR BLD: 6.9 % (ref 4–5.6)
HCT VFR BLD AUTO: 44.7 % (ref 37–47)
HDLC SERPL-MCNC: 42 MG/DL
HGB BLD-MCNC: 14.8 G/DL (ref 12–16)
IMM GRANULOCYTES # BLD AUTO: 0.01 K/UL (ref 0–0.11)
IMM GRANULOCYTES NFR BLD AUTO: 0.2 % (ref 0–0.9)
LDLC SERPL CALC-MCNC: 52 MG/DL
LYMPHOCYTES # BLD AUTO: 1.18 K/UL (ref 1–4.8)
LYMPHOCYTES NFR BLD: 20.9 % (ref 22–41)
MCH RBC QN AUTO: 28.9 PG (ref 27–33)
MCHC RBC AUTO-ENTMCNC: 33.1 G/DL (ref 33.6–35)
MCV RBC AUTO: 87.3 FL (ref 81.4–97.8)
MONOCYTES # BLD AUTO: 0.49 K/UL (ref 0–0.85)
MONOCYTES NFR BLD AUTO: 8.7 % (ref 0–13.4)
NEUTROPHILS # BLD AUTO: 3.2 K/UL (ref 2–7.15)
NEUTROPHILS NFR BLD: 56.6 % (ref 44–72)
NRBC # BLD AUTO: 0 K/UL
NRBC BLD-RTO: 0 /100 WBC
PLATELET # BLD AUTO: 155 K/UL (ref 164–446)
PMV BLD AUTO: 13.1 FL (ref 9–12.9)
POTASSIUM SERPL-SCNC: 4.1 MMOL/L (ref 3.6–5.5)
PROT SERPL-MCNC: 7 G/DL (ref 6–8.2)
RBC # BLD AUTO: 5.12 M/UL (ref 4.2–5.4)
SODIUM SERPL-SCNC: 142 MMOL/L (ref 135–145)
TRIGL SERPL-MCNC: 142 MG/DL (ref 0–149)
WBC # BLD AUTO: 5.7 K/UL (ref 4.8–10.8)

## 2022-03-01 PROCEDURE — 36415 COLL VENOUS BLD VENIPUNCTURE: CPT | Mod: GA

## 2022-03-01 PROCEDURE — 80061 LIPID PANEL: CPT

## 2022-03-01 PROCEDURE — 80053 COMPREHEN METABOLIC PANEL: CPT

## 2022-03-01 PROCEDURE — 83036 HEMOGLOBIN GLYCOSYLATED A1C: CPT | Mod: GA

## 2022-03-01 PROCEDURE — 85025 COMPLETE CBC W/AUTO DIFF WBC: CPT

## 2022-03-08 ENCOUNTER — OFFICE VISIT (OUTPATIENT)
Dept: MEDICAL GROUP | Age: 77
End: 2022-03-08
Payer: MEDICARE

## 2022-03-08 VITALS
TEMPERATURE: 98.3 F | HEIGHT: 63 IN | BODY MASS INDEX: 33.66 KG/M2 | WEIGHT: 190 LBS | SYSTOLIC BLOOD PRESSURE: 118 MMHG | OXYGEN SATURATION: 100 % | RESPIRATION RATE: 12 BRPM | DIASTOLIC BLOOD PRESSURE: 60 MMHG | HEART RATE: 75 BPM

## 2022-03-08 DIAGNOSIS — E55.9 VITAMIN D DEFICIENCY: ICD-10-CM

## 2022-03-08 DIAGNOSIS — E78.2 MIXED HYPERLIPIDEMIA: ICD-10-CM

## 2022-03-08 DIAGNOSIS — I10 ESSENTIAL HYPERTENSION: ICD-10-CM

## 2022-03-08 DIAGNOSIS — E11.8 CONTROLLED TYPE 2 DIABETES MELLITUS WITH COMPLICATION, WITHOUT LONG-TERM CURRENT USE OF INSULIN (HCC): ICD-10-CM

## 2022-03-08 PROCEDURE — 99214 OFFICE O/P EST MOD 30 MIN: CPT | Performed by: INTERNAL MEDICINE

## 2022-03-08 RX ORDER — ATORVASTATIN CALCIUM 20 MG/1
20 TABLET, FILM COATED ORAL DAILY
Qty: 90 TABLET | Refills: 4 | Status: SHIPPED | OUTPATIENT
Start: 2022-03-08 | End: 2023-01-12 | Stop reason: SDUPTHER

## 2022-03-08 RX ORDER — LOSARTAN POTASSIUM 100 MG/1
100 TABLET ORAL DAILY
Qty: 90 TABLET | Refills: 4 | Status: SHIPPED | OUTPATIENT
Start: 2022-03-08 | End: 2023-01-12 | Stop reason: SDUPTHER

## 2022-03-08 RX ORDER — PIOGLITAZONEHYDROCHLORIDE 45 MG/1
22.5 TABLET ORAL DAILY
Qty: 90 TABLET | Refills: 4
Start: 2022-03-08 | End: 2023-01-12 | Stop reason: SDUPTHER

## 2022-03-08 ASSESSMENT — ENCOUNTER SYMPTOMS
PSYCHIATRIC NEGATIVE: 1
RESPIRATORY NEGATIVE: 1
CARDIOVASCULAR NEGATIVE: 1
EYES NEGATIVE: 1
MUSCULOSKELETAL NEGATIVE: 1
GASTROINTESTINAL NEGATIVE: 1
CONSTITUTIONAL NEGATIVE: 1
NEUROLOGICAL NEGATIVE: 1

## 2022-03-08 ASSESSMENT — FIBROSIS 4 INDEX: FIB4 SCORE: 1.66

## 2022-03-08 ASSESSMENT — PATIENT HEALTH QUESTIONNAIRE - PHQ9: CLINICAL INTERPRETATION OF PHQ2 SCORE: 0

## 2022-03-08 NOTE — PROGRESS NOTES
Subjective     Pooja Llanos is a 76 y.o. female who presents with Lab Results (6 months follow up)   The patient is here for followup of chronic medical problems listed below. The patient is compliant with medications and having no side effects from them. Denies chest pain, abdominal pain, dyspnea, myalgias, or cough.   Patient Active Problem List    Diagnosis Date Noted   • Benign meningioma ([facility]) 05/23/2018   • DDD (degenerative disc disease), lumbar 06/29/2016   • Controlled type 2 diabetes mellitus with complication, without long-term current use of insulin ([facility]) 10/27/2015   • Osteoporosis, post-menopausal- since about 2002; on fosamax since early 2000s 09/17/2014   • Mixed hyperlipidemia 08/27/2014   • Essential hypertension 08/27/2014   • Class 2 severe obesity due to excess calories with serious comorbidity and body mass index (BMI) of 35.0 to 35.9 in adult ([facility]) 08/27/2014   • Primary osteoarthritis involving multiple joints 08/27/2014   • History of breast cancer- 2001; RT and lumpectomy; right side; neg nodes; tamoxifen x 5 yrs 08/27/2014   • Vitamin D deficiency 08/27/2014     Allergies   Allergen Reactions   • Other Environmental Runny Nose     Pt. Has seasonal allergies with itchy watery eyes.     Outpatient Medications Prior to Visit   Medication Sig Dispense Refill   • metFORMIN (GLUCOPHAGE) 500 MG Tab Take 1 Tablet by mouth 2 times a day with meals. 180 Tablet 4   • Cholecalciferol (VITAMIN D) 50 MCG (2000 UT) Cap Take 1 Cap by mouth every day. 100 Cap 4   • glucosamine Sulfate 500 MG Cap Take 500 mg by mouth 3 times a day, with meals.     • Calcium Carbonate-Vitamin D (CALCIUM + D PO) Take  by mouth 2 Times a Day.     • losartan (COZAAR) 100 MG Tab Take 1 tablet by mouth every day. 90 tablet 4   • atorvastatin (LIPITOR) 20 MG Tab Take 1 tablet by mouth every day. 90 tablet 4   • aspirin EC (ECOTRIN) 81 MG Tablet Delayed Response Take 1 Tab by mouth every day. (Patient not taking: Reported on  "3/8/2022) 30 Tab 4     No facility-administered medications prior to visit.               HPI    Review of Systems   Constitutional: Negative.    HENT: Negative.    Eyes: Negative.    Respiratory: Negative.    Cardiovascular: Negative.    Gastrointestinal: Negative.    Genitourinary: Negative.    Musculoskeletal: Negative.    Skin: Negative.    Neurological: Negative.    Endo/Heme/Allergies: Negative.    Psychiatric/Behavioral: Negative.               Objective     /60   Pulse 75   Temp 36.8 °C (98.3 °F) (Temporal)   Resp 12   Ht 1.6 m (5' 3\")   Wt 86.2 kg (190 lb)   SpO2 100%   BMI 33.66 kg/m²      Physical Exam  Vitals reviewed.   Constitutional:       General: She is not in acute distress.     Appearance: She is well-developed. She is not diaphoretic.   HENT:      Head: Normocephalic and atraumatic.      Right Ear: External ear normal.      Left Ear: External ear normal.      Nose: Nose normal.      Mouth/Throat:      Pharynx: No oropharyngeal exudate.   Eyes:      General: No scleral icterus.        Right eye: No discharge.         Left eye: No discharge.      Conjunctiva/sclera: Conjunctivae normal.      Pupils: Pupils are equal, round, and reactive to light.   Neck:      Thyroid: No thyromegaly.      Vascular: No JVD.      Trachea: No tracheal deviation.   Cardiovascular:      Rate and Rhythm: Normal rate and regular rhythm.      Heart sounds: Normal heart sounds. No murmur heard.    No friction rub. No gallop.   Pulmonary:      Effort: Pulmonary effort is normal. No respiratory distress.      Breath sounds: Normal breath sounds. No stridor. No wheezing or rales.   Chest:      Chest wall: No tenderness.   Abdominal:      General: Bowel sounds are normal. There is no distension.      Palpations: Abdomen is soft. There is no mass.      Tenderness: There is no abdominal tenderness. There is no guarding or rebound.   Musculoskeletal:         General: No tenderness. Normal range of motion.      " Cervical back: Normal range of motion and neck supple.   Lymphadenopathy:      Cervical: No cervical adenopathy.   Skin:     General: Skin is warm and dry.      Coloration: Skin is not pale.      Findings: No erythema or rash.   Neurological:      Mental Status: She is alert and oriented to person, place, and time.      Cranial Nerves: No cranial nerve deficit.      Motor: No abnormal muscle tone.      Coordination: Coordination normal.      Deep Tendon Reflexes: Reflexes are normal and symmetric. Reflexes normal.   Psychiatric:         Behavior: Behavior normal.         Thought Content: Thought content normal.         Judgment: Judgment normal.       Hospital Outpatient Visit on 03/01/2022   Component Date Value   • Glycohemoglobin 03/01/2022 6.9 (A)   • Est Avg Glucose 03/01/2022 151    • WBC 03/01/2022 5.7    • RBC 03/01/2022 5.12    • Hemoglobin 03/01/2022 14.8    • Hematocrit 03/01/2022 44.7    • MCV 03/01/2022 87.3    • MCH 03/01/2022 28.9    • MCHC 03/01/2022 33.1 (A)   • RDW 03/01/2022 39.8    • Platelet Count 03/01/2022 155 (A)   • MPV 03/01/2022 13.1 (A)   • Neutrophils-Polys 03/01/2022 56.60    • Lymphocytes 03/01/2022 20.90 (A)   • Monocytes 03/01/2022 8.70    • Eosinophils 03/01/2022 12.70 (A)   • Basophils 03/01/2022 0.90    • Immature Granulocytes 03/01/2022 0.20    • Nucleated RBC 03/01/2022 0.00    • Neutrophils (Absolute) 03/01/2022 3.20    • Lymphs (Absolute) 03/01/2022 1.18    • Monos (Absolute) 03/01/2022 0.49    • Eos (Absolute) 03/01/2022 0.72 (A)   • Baso (Absolute) 03/01/2022 0.05    • Immature Granulocytes (a* 03/01/2022 0.01    • NRBC (Absolute) 03/01/2022 0.00    • Cholesterol,Tot 03/01/2022 122    • Triglycerides 03/01/2022 142    • HDL 03/01/2022 42    • LDL 03/01/2022 52    • Sodium 03/01/2022 142    • Potassium 03/01/2022 4.1    • Chloride 03/01/2022 106    • Co2 03/01/2022 24    • Anion Gap 03/01/2022 12.0    • Glucose 03/01/2022 134 (A)   • Bun 03/01/2022 21    • Creatinine  03/01/2022 0.86    • Calcium 03/01/2022 9.3    • AST(SGOT) 03/01/2022 17    • ALT(SGPT) 03/01/2022 24    • Alkaline Phosphatase 03/01/2022 64    • Total Bilirubin 03/01/2022 0.4    • Albumin 03/01/2022 4.3    • Total Protein 03/01/2022 7.0    • Globulin 03/01/2022 2.7    • A-G Ratio 03/01/2022 1.6    • Fasting Status 03/01/2022 Fasting    • GFR If  03/01/2022 >60    • GFR If Non  [employer]* 03/01/2022 >60       Lab Results   Component Value Date/Time    HBA1C 6.9 (H) 03/01/2022 10:25 AM    HBA1C 6.9 (H) 08/25/2021 10:33 AM     Lab Results   Component Value Date/Time    SODIUM 142 03/01/2022 10:25 AM    POTASSIUM 4.1 03/01/2022 10:25 AM    CHLORIDE 106 03/01/2022 10:25 AM    CO2 24 03/01/2022 10:25 AM    GLUCOSE 134 (H) 03/01/2022 10:25 AM    BUN 21 03/01/2022 10:25 AM    CREATININE 0.86 03/01/2022 10:25 AM    ALKPHOSPHAT 64 03/01/2022 10:25 AM    ASTSGOT 17 03/01/2022 10:25 AM    ALTSGPT 24 03/01/2022 10:25 AM    TBILIRUBIN 0.4 03/01/2022 10:25 AM     No results found for: INR  Lab Results   Component Value Date/Time    CHOLSTRLTOT 122 03/01/2022 10:25 AM    LDL 52 03/01/2022 10:25 AM    HDL 42 03/01/2022 10:25 AM    TRIGLYCERIDE 142 03/01/2022 10:25 AM       No results found for: TESTOSTERONE  No results found for: TSH  Lab Results   Component Value Date/Time    FREET4 0.97 09/09/2014 09:25 AM     No results found for: URICACID  No components found for: VITB12  Lab Results   Component Value Date/Time    25HYDROXY 55 08/25/2021 10:33 AM    25HYDROXY 49 01/26/2021 10:50 AM   '                            Assessment & Plan        1. Essential hypertension   Under good control. Continue same regimen.    - losartan (COZAAR) 100 MG Tab; Take 1 Tablet by mouth every day.  Dispense: 90 Tablet; Refill: 4  - TSH; Future  - Lipid Profile; Future  - CBC WITH DIFFERENTIAL; Future  - HEMOGLOBIN A1C; Future  - VITAMIN D,25 HYDROXY; Future  - MICROALBUMIN CREAT RATIO URINE; Future  - Comp Metabolic Panel;  Future    2. Mixed hyperlipidemia  Under good control. Continue same regimen.      - atorvastatin (LIPITOR) 20 MG Tab; Take 1 Tablet by mouth every day.  Dispense: 90 Tablet; Refill: 4  - TSH; Future  - Lipid Profile; Future  - CBC WITH DIFFERENTIAL; Future  - HEMOGLOBIN A1C; Future  - VITAMIN D,25 HYDROXY; Future  - MICROALBUMIN CREAT RATIO URINE; Future  - Comp Metabolic Panel; Future    3. Controlled type 2 diabetes mellitus with complication, without long-term current use of insulin (HCC)  Under good control. Continue same regimen.     - pioglitazone (ACTOS) 45 MG Tab; Take 0.5 Tablets by mouth every day.  Dispense: 90 Tablet; Refill: 4  - TSH; Future  - Lipid Profile; Future  - CBC WITH DIFFERENTIAL; Future  - HEMOGLOBIN A1C; Future  - VITAMIN D,25 HYDROXY; Future  - MICROALBUMIN CREAT RATIO URINE; Future  - Comp Metabolic Panel; Future    4. Vitamin D deficiency         Under good control. Continue same regimen.'    - TSH; Future  - Lipid Profile; Future  - CBC WITH DIFFERENTIAL; Future  - HEMOGLOBIN A1C; Future  - VITAMIN D,25 HYDROXY; Future  - MICROALBUMIN CREAT RATIO URINE; Future  - Comp Metabolic Panel; Future

## 2022-09-07 DIAGNOSIS — E11.8 CONTROLLED TYPE 2 DIABETES MELLITUS WITH COMPLICATION, WITHOUT LONG-TERM CURRENT USE OF INSULIN (HCC): ICD-10-CM

## 2022-10-17 ENCOUNTER — HOSPITAL ENCOUNTER (OUTPATIENT)
Dept: LAB | Facility: MEDICAL CENTER | Age: 77
End: 2022-10-17
Attending: INTERNAL MEDICINE
Payer: MEDICARE

## 2022-10-17 DIAGNOSIS — I10 ESSENTIAL HYPERTENSION: ICD-10-CM

## 2022-10-17 DIAGNOSIS — E11.8 CONTROLLED TYPE 2 DIABETES MELLITUS WITH COMPLICATION, WITHOUT LONG-TERM CURRENT USE OF INSULIN (HCC): ICD-10-CM

## 2022-10-17 DIAGNOSIS — E55.9 VITAMIN D DEFICIENCY: ICD-10-CM

## 2022-10-17 DIAGNOSIS — E78.2 MIXED HYPERLIPIDEMIA: ICD-10-CM

## 2022-10-17 LAB
25(OH)D3 SERPL-MCNC: 41 NG/ML (ref 30–100)
ALBUMIN SERPL BCP-MCNC: 4.3 G/DL (ref 3.2–4.9)
ALBUMIN/GLOB SERPL: 1.4 G/DL
ALP SERPL-CCNC: 54 U/L (ref 30–99)
ALT SERPL-CCNC: 20 U/L (ref 2–50)
ANION GAP SERPL CALC-SCNC: 11 MMOL/L (ref 7–16)
AST SERPL-CCNC: 18 U/L (ref 12–45)
BASOPHILS # BLD AUTO: 1.5 % (ref 0–1.8)
BASOPHILS # BLD: 0.06 K/UL (ref 0–0.12)
BILIRUB SERPL-MCNC: 0.3 MG/DL (ref 0.1–1.5)
BUN SERPL-MCNC: 17 MG/DL (ref 8–22)
CALCIUM SERPL-MCNC: 9.1 MG/DL (ref 8.5–10.5)
CHLORIDE SERPL-SCNC: 106 MMOL/L (ref 96–112)
CHOLEST SERPL-MCNC: 115 MG/DL (ref 100–199)
CO2 SERPL-SCNC: 26 MMOL/L (ref 20–33)
CREAT SERPL-MCNC: 0.8 MG/DL (ref 0.5–1.4)
CREAT UR-MCNC: 82.65 MG/DL
EOSINOPHIL # BLD AUTO: 0.24 K/UL (ref 0–0.51)
EOSINOPHIL NFR BLD: 5.9 % (ref 0–6.9)
ERYTHROCYTE [DISTWIDTH] IN BLOOD BY AUTOMATED COUNT: 43.8 FL (ref 35.9–50)
EST. AVERAGE GLUCOSE BLD GHB EST-MCNC: 131 MG/DL
FASTING STATUS PATIENT QL REPORTED: NORMAL
GFR SERPLBLD CREATININE-BSD FMLA CKD-EPI: 77 ML/MIN/1.73 M 2
GLOBULIN SER CALC-MCNC: 3 G/DL (ref 1.9–3.5)
GLUCOSE SERPL-MCNC: 100 MG/DL (ref 65–99)
HBA1C MFR BLD: 6.2 % (ref 4–5.6)
HCT VFR BLD AUTO: 43.2 % (ref 37–47)
HDLC SERPL-MCNC: 51 MG/DL
HGB BLD-MCNC: 14.1 G/DL (ref 12–16)
IMM GRANULOCYTES # BLD AUTO: 0.01 K/UL (ref 0–0.11)
IMM GRANULOCYTES NFR BLD AUTO: 0.2 % (ref 0–0.9)
LDLC SERPL CALC-MCNC: 46 MG/DL
LYMPHOCYTES # BLD AUTO: 1.01 K/UL (ref 1–4.8)
LYMPHOCYTES NFR BLD: 24.6 % (ref 22–41)
MCH RBC QN AUTO: 29 PG (ref 27–33)
MCHC RBC AUTO-ENTMCNC: 32.6 G/DL (ref 33.6–35)
MCV RBC AUTO: 88.9 FL (ref 81.4–97.8)
MICROALBUMIN UR-MCNC: <1.2 MG/DL
MICROALBUMIN/CREAT UR: NORMAL MG/G (ref 0–30)
MONOCYTES # BLD AUTO: 0.5 K/UL (ref 0–0.85)
MONOCYTES NFR BLD AUTO: 12.2 % (ref 0–13.4)
NEUTROPHILS # BLD AUTO: 2.28 K/UL (ref 2–7.15)
NEUTROPHILS NFR BLD: 55.6 % (ref 44–72)
NRBC # BLD AUTO: 0 K/UL
NRBC BLD-RTO: 0 /100 WBC
PLATELET # BLD AUTO: 159 K/UL (ref 164–446)
PMV BLD AUTO: 12 FL (ref 9–12.9)
POTASSIUM SERPL-SCNC: 3.9 MMOL/L (ref 3.6–5.5)
PROT SERPL-MCNC: 7.3 G/DL (ref 6–8.2)
RBC # BLD AUTO: 4.86 M/UL (ref 4.2–5.4)
SODIUM SERPL-SCNC: 143 MMOL/L (ref 135–145)
TRIGL SERPL-MCNC: 91 MG/DL (ref 0–149)
TSH SERPL DL<=0.005 MIU/L-ACNC: 3.26 UIU/ML (ref 0.38–5.33)
WBC # BLD AUTO: 4.1 K/UL (ref 4.8–10.8)

## 2022-10-17 PROCEDURE — 85025 COMPLETE CBC W/AUTO DIFF WBC: CPT

## 2022-10-17 PROCEDURE — 82570 ASSAY OF URINE CREATININE: CPT

## 2022-10-17 PROCEDURE — 80061 LIPID PANEL: CPT

## 2022-10-17 PROCEDURE — 82043 UR ALBUMIN QUANTITATIVE: CPT

## 2022-10-17 PROCEDURE — 84443 ASSAY THYROID STIM HORMONE: CPT

## 2022-10-17 PROCEDURE — 82306 VITAMIN D 25 HYDROXY: CPT

## 2022-10-17 PROCEDURE — 36415 COLL VENOUS BLD VENIPUNCTURE: CPT

## 2022-10-17 PROCEDURE — 83036 HEMOGLOBIN GLYCOSYLATED A1C: CPT | Mod: GA

## 2022-10-17 PROCEDURE — 80053 COMPREHEN METABOLIC PANEL: CPT

## 2022-11-09 ENCOUNTER — PATIENT MESSAGE (OUTPATIENT)
Dept: HEALTH INFORMATION MANAGEMENT | Facility: OTHER | Age: 77
End: 2022-11-09

## 2023-01-12 ENCOUNTER — OFFICE VISIT (OUTPATIENT)
Dept: MEDICAL GROUP | Age: 78
End: 2023-01-12
Payer: MEDICARE

## 2023-01-12 VITALS
OXYGEN SATURATION: 97 % | SYSTOLIC BLOOD PRESSURE: 120 MMHG | RESPIRATION RATE: 16 BRPM | WEIGHT: 189 LBS | HEART RATE: 69 BPM | HEIGHT: 63 IN | DIASTOLIC BLOOD PRESSURE: 78 MMHG | TEMPERATURE: 97.8 F | BODY MASS INDEX: 33.49 KG/M2

## 2023-01-12 DIAGNOSIS — E66.09 CLASS 1 OBESITY DUE TO EXCESS CALORIES WITH SERIOUS COMORBIDITY AND BODY MASS INDEX (BMI) OF 33.0 TO 33.9 IN ADULT: ICD-10-CM

## 2023-01-12 DIAGNOSIS — D32.9 BENIGN MENINGIOMA (HCC): ICD-10-CM

## 2023-01-12 DIAGNOSIS — E78.2 MIXED HYPERLIPIDEMIA: ICD-10-CM

## 2023-01-12 DIAGNOSIS — I10 ESSENTIAL HYPERTENSION: ICD-10-CM

## 2023-01-12 DIAGNOSIS — E55.9 VITAMIN D DEFICIENCY: ICD-10-CM

## 2023-01-12 DIAGNOSIS — E11.8 CONTROLLED TYPE 2 DIABETES MELLITUS WITH COMPLICATION, WITHOUT LONG-TERM CURRENT USE OF INSULIN (HCC): ICD-10-CM

## 2023-01-12 PROBLEM — E66.811 CLASS 1 OBESITY DUE TO EXCESS CALORIES WITH SERIOUS COMORBIDITY AND BODY MASS INDEX (BMI) OF 33.0 TO 33.9 IN ADULT: Status: ACTIVE | Noted: 2023-01-12

## 2023-01-12 PROCEDURE — 99214 OFFICE O/P EST MOD 30 MIN: CPT | Performed by: INTERNAL MEDICINE

## 2023-01-12 RX ORDER — PIOGLITAZONEHYDROCHLORIDE 45 MG/1
22.5 TABLET ORAL DAILY
Qty: 90 TABLET | Refills: 4 | Status: SHIPPED | OUTPATIENT
Start: 2023-01-12 | End: 2023-04-11 | Stop reason: SDUPTHER

## 2023-01-12 RX ORDER — LOSARTAN POTASSIUM 100 MG/1
100 TABLET ORAL DAILY
Qty: 90 TABLET | Refills: 4 | Status: SHIPPED | OUTPATIENT
Start: 2023-01-12 | End: 2023-04-11 | Stop reason: SDUPTHER

## 2023-01-12 RX ORDER — ATORVASTATIN CALCIUM 20 MG/1
20 TABLET, FILM COATED ORAL DAILY
Qty: 90 TABLET | Refills: 4 | Status: SHIPPED | OUTPATIENT
Start: 2023-01-12 | End: 2023-04-11

## 2023-01-12 RX ORDER — MULTIVIT-MIN/IRON/FOLIC ACID/K 18-600-40
2000 CAPSULE ORAL DAILY
Qty: 100 CAPSULE | Refills: 4 | Status: SHIPPED | OUTPATIENT
Start: 2023-01-12 | End: 2023-04-11 | Stop reason: SDUPTHER

## 2023-01-12 ASSESSMENT — ENCOUNTER SYMPTOMS
CARDIOVASCULAR NEGATIVE: 1
NEUROLOGICAL NEGATIVE: 1
GASTROINTESTINAL NEGATIVE: 1
EYES NEGATIVE: 1
RESPIRATORY NEGATIVE: 1
CONSTITUTIONAL NEGATIVE: 1
PSYCHIATRIC NEGATIVE: 1
MUSCULOSKELETAL NEGATIVE: 1

## 2023-01-12 ASSESSMENT — PATIENT HEALTH QUESTIONNAIRE - PHQ9: CLINICAL INTERPRETATION OF PHQ2 SCORE: 0

## 2023-01-12 ASSESSMENT — FIBROSIS 4 INDEX: FIB4 SCORE: 1.9

## 2023-01-12 NOTE — PROGRESS NOTES
Subjective     Pooja Llanos is a 77 y.o. female who presents with Follow-Up (Lab  review has mole on face wants you to look at them )  The patient is here for followup of chronic medical problems listed below. The patient is compliant with medications and having no side effects from them. Denies chest pain, abdominal pain, dyspnea, myalgias, or cough.   Patient Active Problem List   Diagnosis    Mixed hyperlipidemia    Essential hypertension    Class 2 severe obesity due to excess calories with serious comorbidity and body mass index (BMI) of 35.0 to 35.9 in adult (HCC)    Primary osteoarthritis involving multiple joints    History of breast cancer- 2001; RT and lumpectomy; right side; neg nodes; tamoxifen x 5 yrs    Vitamin D deficiency    Osteoporosis, post-menopausal- since about 2002; on fosamax since early 2000s    Controlled type 2 diabetes mellitus with complication, without long-term current use of insulin (HCC)    DDD (degenerative disc disease), lumbar    Benign meningioma (HCC)    Obesity due to excess calories with serious comorbidity     Allergies   Allergen Reactions    Other Environmental Runny Nose     Pt. Has seasonal allergies with itchy watery eyes.     Outpatient Medications Prior to Visit   Medication Sig Dispense Refill    glucosamine Sulfate 500 MG Cap Take 500 mg by mouth 3 times a day, with meals.      Calcium Carbonate-Vitamin D (CALCIUM + D PO) Take  by mouth 2 Times a Day.      metFORMIN (GLUCOPHAGE) 500 MG Tab TAKE 1 TABLET TWICE DAILY  WITH MEALS 180 Tablet 3    losartan (COZAAR) 100 MG Tab Take 1 Tablet by mouth every day. 90 Tablet 4    atorvastatin (LIPITOR) 20 MG Tab Take 1 Tablet by mouth every day. 90 Tablet 4    pioglitazone (ACTOS) 45 MG Tab Take 0.5 Tablets by mouth every day. 90 Tablet 4    Cholecalciferol (VITAMIN D) 50 MCG (2000 UT) Cap Take 1 Cap by mouth every day. 100 Cap 4    aspirin EC (ECOTRIN) 81 MG Tablet Delayed Response Take 1 Tab by mouth every day. (Patient  "not taking: Reported on 3/8/2022) 30 Tab 4     No facility-administered medications prior to visit.                 HPI    Review of Systems   Constitutional: Negative.    HENT: Negative.     Eyes: Negative.    Respiratory: Negative.     Cardiovascular: Negative.    Gastrointestinal: Negative.    Genitourinary: Negative.    Musculoskeletal: Negative.    Skin: Negative.    Neurological: Negative.    Endo/Heme/Allergies: Negative.    Psychiatric/Behavioral: Negative.              Objective     /78 (BP Location: Right arm, Patient Position: Sitting, BP Cuff Size: Adult)   Pulse 69   Temp 36.6 °C (97.8 °F) (Temporal)   Resp 16   Ht 1.6 m (5' 3\")   Wt 85.7 kg (189 lb)   SpO2 97%   BMI 33.48 kg/m²      Physical Exam  Vitals reviewed.   Constitutional:       General: She is not in acute distress.     Appearance: She is well-developed. She is not diaphoretic.   HENT:      Head: Normocephalic and atraumatic.      Right Ear: External ear normal.      Left Ear: External ear normal.      Nose: Nose normal.      Mouth/Throat:      Pharynx: No oropharyngeal exudate.   Eyes:      General: No scleral icterus.        Right eye: No discharge.         Left eye: No discharge.      Conjunctiva/sclera: Conjunctivae normal.      Pupils: Pupils are equal, round, and reactive to light.   Neck:      Thyroid: No thyromegaly.      Vascular: No JVD.      Trachea: No tracheal deviation.   Cardiovascular:      Rate and Rhythm: Normal rate and regular rhythm.      Heart sounds: Normal heart sounds. No murmur heard.    No friction rub. No gallop.   Pulmonary:      Effort: Pulmonary effort is normal. No respiratory distress.      Breath sounds: Normal breath sounds. No stridor. No wheezing or rales.   Chest:      Chest wall: No tenderness.   Abdominal:      General: Bowel sounds are normal. There is no distension.      Palpations: Abdomen is soft. There is no mass.      Tenderness: There is no abdominal tenderness. There is no guarding " or rebound.   Musculoskeletal:         General: No tenderness. Normal range of motion.      Cervical back: Normal range of motion and neck supple.   Lymphadenopathy:      Cervical: No cervical adenopathy.   Skin:     General: Skin is warm and dry.      Coloration: Skin is not pale.      Findings: No erythema or rash.   Neurological:      Mental Status: She is alert and oriented to person, place, and time.      Cranial Nerves: No cranial nerve deficit.      Motor: No abnormal muscle tone.      Coordination: Coordination normal.      Deep Tendon Reflexes: Reflexes are normal and symmetric. Reflexes normal.   Psychiatric:         Behavior: Behavior normal.         Thought Content: Thought content normal.         Judgment: Judgment normal.                           Assessment & Plan   1. Controlled type 2 diabetes mellitus with complication, without long-term current use of insulin (HCC)   Under good control. Continue same regimen.'  - TSH; Future  - Comp Metabolic Panel; Future  - Lipid Profile; Future  - CBC WITH DIFFERENTIAL; Future  - Referral to Ophthalmology  - Diabetic Monofilament Lower Extremity Exam  - metFORMIN (GLUCOPHAGE) 500 MG Tab; Take 1 Tablet by mouth 2 times a day with meals.  Dispense: 180 Tablet; Refill: 4  - pioglitazone (ACTOS) 45 MG Tab; Take 0.5 Tablets by mouth every day.  Dispense: 90 Tablet; Refill: 4    2. Class 2 severe obesity due to excess calories with serious comorbidity and body mass index (BMI) of 35.0 to 35.9 in adult (HCC)   Under good control. Continue same regimen.'  Mediterranean diet and exercise. diet/exercise/lose 15 lbs.; patient counseled      3. Benign meningioma (HCC)  No headaches or visual disturbance.  Continue surveillance with periodic MRI or CT scanning.    4. Essential hypertension   Under good control. Continue same regimen.'  - TSH; Future  - Comp Metabolic Panel; Future  - Lipid Profile; Future  - losartan (COZAAR) 100 MG Tab; Take 1 Tablet by mouth every day.   Dispense: 90 Tablet; Refill: 4    5. Mixed hyperlipidemia   Under good control. Continue same regimen.'  - TSH; Future  - Comp Metabolic Panel; Future  - Lipid Profile; Future  - CBC WITH DIFFERENTIAL; Future  - HEMOGLOBIN A1C; Future  - MICROALBUMIN CREAT RATIO URINE; Future  - atorvastatin (LIPITOR) 20 MG Tab; Take 1 Tablet by mouth every day.  Dispense: 90 Tablet; Refill: 4    6. Vitamin D deficiency   Under good control. Continue same regimen.'  - VITAMIN D,25 HYDROXY (DEFICIENCY); Future  - Cholecalciferol (VITAMIN D) 50 MCG (2000 UT) Cap; Take 1 Capsule by mouth every day.  Dispense: 100 Capsule; Refill: 4    7. Class 1 obesity due to excess calories with serious comorbidity and body mass index (BMI) of 33.0 to 33.9 in adult          diet/exercise/lose 15 lbs.; patient counseled    - Patient identified as having weight management issue.  Appropriate orders and counseling given.  - Referral to Nutrition Services

## 2023-02-08 ENCOUNTER — HOSPITAL ENCOUNTER (OUTPATIENT)
Dept: RADIOLOGY | Facility: MEDICAL CENTER | Age: 78
End: 2023-02-08
Attending: INTERNAL MEDICINE
Payer: MEDICARE

## 2023-02-08 DIAGNOSIS — Z12.31 VISIT FOR SCREENING MAMMOGRAM: ICD-10-CM

## 2023-02-08 PROCEDURE — 77063 BREAST TOMOSYNTHESIS BI: CPT

## 2023-04-03 ENCOUNTER — HOSPITAL ENCOUNTER (OUTPATIENT)
Dept: LAB | Facility: MEDICAL CENTER | Age: 78
End: 2023-04-03
Attending: INTERNAL MEDICINE
Payer: MEDICARE

## 2023-04-03 DIAGNOSIS — E11.8 CONTROLLED TYPE 2 DIABETES MELLITUS WITH COMPLICATION, WITHOUT LONG-TERM CURRENT USE OF INSULIN (HCC): ICD-10-CM

## 2023-04-03 DIAGNOSIS — E55.9 VITAMIN D DEFICIENCY: ICD-10-CM

## 2023-04-03 DIAGNOSIS — E78.2 MIXED HYPERLIPIDEMIA: ICD-10-CM

## 2023-04-03 DIAGNOSIS — I10 ESSENTIAL HYPERTENSION: ICD-10-CM

## 2023-04-03 LAB
25(OH)D3 SERPL-MCNC: 39 NG/ML (ref 30–100)
ALBUMIN SERPL BCP-MCNC: 4.2 G/DL (ref 3.2–4.9)
ALBUMIN/GLOB SERPL: 1.6 G/DL
ALP SERPL-CCNC: 61 U/L (ref 30–99)
ALT SERPL-CCNC: 28 U/L (ref 2–50)
ANION GAP SERPL CALC-SCNC: 11 MMOL/L (ref 7–16)
AST SERPL-CCNC: 20 U/L (ref 12–45)
BASOPHILS # BLD AUTO: 1.1 % (ref 0–1.8)
BASOPHILS # BLD: 0.06 K/UL (ref 0–0.12)
BILIRUB SERPL-MCNC: 0.2 MG/DL (ref 0.1–1.5)
BUN SERPL-MCNC: 22 MG/DL (ref 8–22)
CALCIUM ALBUM COR SERPL-MCNC: 8.8 MG/DL (ref 8.5–10.5)
CALCIUM SERPL-MCNC: 9 MG/DL (ref 8.5–10.5)
CHLORIDE SERPL-SCNC: 106 MMOL/L (ref 96–112)
CHOLEST SERPL-MCNC: 150 MG/DL (ref 100–199)
CO2 SERPL-SCNC: 26 MMOL/L (ref 20–33)
CREAT SERPL-MCNC: 0.69 MG/DL (ref 0.5–1.4)
CREAT UR-MCNC: 104.84 MG/DL
EOSINOPHIL # BLD AUTO: 0.38 K/UL (ref 0–0.51)
EOSINOPHIL NFR BLD: 7 % (ref 0–6.9)
ERYTHROCYTE [DISTWIDTH] IN BLOOD BY AUTOMATED COUNT: 42.5 FL (ref 35.9–50)
EST. AVERAGE GLUCOSE BLD GHB EST-MCNC: 137 MG/DL
FASTING STATUS PATIENT QL REPORTED: NORMAL
GFR SERPLBLD CREATININE-BSD FMLA CKD-EPI: 90 ML/MIN/1.73 M 2
GLOBULIN SER CALC-MCNC: 2.7 G/DL (ref 1.9–3.5)
GLUCOSE SERPL-MCNC: 141 MG/DL (ref 65–99)
HBA1C MFR BLD: 6.4 % (ref 4–5.6)
HCT VFR BLD AUTO: 45.9 % (ref 37–47)
HDLC SERPL-MCNC: 56 MG/DL
HGB BLD-MCNC: 14.9 G/DL (ref 12–16)
IMM GRANULOCYTES # BLD AUTO: 0.02 K/UL (ref 0–0.11)
IMM GRANULOCYTES NFR BLD AUTO: 0.4 % (ref 0–0.9)
LDLC SERPL CALC-MCNC: 72 MG/DL
LYMPHOCYTES # BLD AUTO: 1.05 K/UL (ref 1–4.8)
LYMPHOCYTES NFR BLD: 19.3 % (ref 22–41)
MCH RBC QN AUTO: 28.8 PG (ref 27–33)
MCHC RBC AUTO-ENTMCNC: 32.5 G/DL (ref 33.6–35)
MCV RBC AUTO: 88.6 FL (ref 81.4–97.8)
MICROALBUMIN UR-MCNC: 1.6 MG/DL
MICROALBUMIN/CREAT UR: 15 MG/G (ref 0–30)
MONOCYTES # BLD AUTO: 0.38 K/UL (ref 0–0.85)
MONOCYTES NFR BLD AUTO: 7 % (ref 0–13.4)
NEUTROPHILS # BLD AUTO: 3.54 K/UL (ref 2–7.15)
NEUTROPHILS NFR BLD: 65.2 % (ref 44–72)
NRBC # BLD AUTO: 0 K/UL
NRBC BLD-RTO: 0 /100 WBC
PLATELET # BLD AUTO: 158 K/UL (ref 164–446)
PMV BLD AUTO: 12.4 FL (ref 9–12.9)
POTASSIUM SERPL-SCNC: 4.7 MMOL/L (ref 3.6–5.5)
PROT SERPL-MCNC: 6.9 G/DL (ref 6–8.2)
RBC # BLD AUTO: 5.18 M/UL (ref 4.2–5.4)
SODIUM SERPL-SCNC: 143 MMOL/L (ref 135–145)
TRIGL SERPL-MCNC: 108 MG/DL (ref 0–149)
TSH SERPL DL<=0.005 MIU/L-ACNC: 3.34 UIU/ML (ref 0.38–5.33)
WBC # BLD AUTO: 5.4 K/UL (ref 4.8–10.8)

## 2023-04-03 PROCEDURE — 36415 COLL VENOUS BLD VENIPUNCTURE: CPT

## 2023-04-03 PROCEDURE — 82306 VITAMIN D 25 HYDROXY: CPT

## 2023-04-03 PROCEDURE — 82570 ASSAY OF URINE CREATININE: CPT

## 2023-04-03 PROCEDURE — 85025 COMPLETE CBC W/AUTO DIFF WBC: CPT

## 2023-04-03 PROCEDURE — 82043 UR ALBUMIN QUANTITATIVE: CPT

## 2023-04-03 PROCEDURE — 80053 COMPREHEN METABOLIC PANEL: CPT

## 2023-04-03 PROCEDURE — 80061 LIPID PANEL: CPT

## 2023-04-03 PROCEDURE — 83036 HEMOGLOBIN GLYCOSYLATED A1C: CPT | Mod: GA

## 2023-04-03 PROCEDURE — 84443 ASSAY THYROID STIM HORMONE: CPT

## 2023-04-08 SDOH — ECONOMIC STABILITY: HOUSING INSECURITY
IN THE LAST 12 MONTHS, WAS THERE A TIME WHEN YOU DID NOT HAVE A STEADY PLACE TO SLEEP OR SLEPT IN A SHELTER (INCLUDING NOW)?: NO

## 2023-04-08 SDOH — ECONOMIC STABILITY: TRANSPORTATION INSECURITY
IN THE PAST 12 MONTHS, HAS THE LACK OF TRANSPORTATION KEPT YOU FROM MEDICAL APPOINTMENTS OR FROM GETTING MEDICATIONS?: NO

## 2023-04-08 SDOH — HEALTH STABILITY: MENTAL HEALTH
STRESS IS WHEN SOMEONE FEELS TENSE, NERVOUS, ANXIOUS, OR CAN'T SLEEP AT NIGHT BECAUSE THEIR MIND IS TROUBLED. HOW STRESSED ARE YOU?: TO SOME EXTENT

## 2023-04-08 SDOH — ECONOMIC STABILITY: INCOME INSECURITY: IN THE LAST 12 MONTHS, WAS THERE A TIME WHEN YOU WERE NOT ABLE TO PAY THE MORTGAGE OR RENT ON TIME?: NO

## 2023-04-08 SDOH — HEALTH STABILITY: PHYSICAL HEALTH: ON AVERAGE, HOW MANY MINUTES DO YOU ENGAGE IN EXERCISE AT THIS LEVEL?: 40 MIN

## 2023-04-08 SDOH — ECONOMIC STABILITY: TRANSPORTATION INSECURITY
IN THE PAST 12 MONTHS, HAS LACK OF TRANSPORTATION KEPT YOU FROM MEETINGS, WORK, OR FROM GETTING THINGS NEEDED FOR DAILY LIVING?: NO

## 2023-04-08 SDOH — ECONOMIC STABILITY: FOOD INSECURITY: WITHIN THE PAST 12 MONTHS, YOU WORRIED THAT YOUR FOOD WOULD RUN OUT BEFORE YOU GOT MONEY TO BUY MORE.: NEVER TRUE

## 2023-04-08 SDOH — ECONOMIC STABILITY: FOOD INSECURITY: WITHIN THE PAST 12 MONTHS, THE FOOD YOU BOUGHT JUST DIDN'T LAST AND YOU DIDN'T HAVE MONEY TO GET MORE.: NEVER TRUE

## 2023-04-08 SDOH — HEALTH STABILITY: PHYSICAL HEALTH: ON AVERAGE, HOW MANY DAYS PER WEEK DO YOU ENGAGE IN MODERATE TO STRENUOUS EXERCISE (LIKE A BRISK WALK)?: 3 DAYS

## 2023-04-08 SDOH — ECONOMIC STABILITY: INCOME INSECURITY: HOW HARD IS IT FOR YOU TO PAY FOR THE VERY BASICS LIKE FOOD, HOUSING, MEDICAL CARE, AND HEATING?: NOT HARD AT ALL

## 2023-04-08 SDOH — ECONOMIC STABILITY: HOUSING INSECURITY: IN THE LAST 12 MONTHS, HOW MANY PLACES HAVE YOU LIVED?: 1

## 2023-04-08 SDOH — ECONOMIC STABILITY: TRANSPORTATION INSECURITY
IN THE PAST 12 MONTHS, HAS LACK OF RELIABLE TRANSPORTATION KEPT YOU FROM MEDICAL APPOINTMENTS, MEETINGS, WORK OR FROM GETTING THINGS NEEDED FOR DAILY LIVING?: NO

## 2023-04-08 ASSESSMENT — SOCIAL DETERMINANTS OF HEALTH (SDOH)
HOW OFTEN DO YOU ATTEND CHURCH OR RELIGIOUS SERVICES?: NEVER
HOW OFTEN DO YOU GET TOGETHER WITH FRIENDS OR RELATIVES?: ONCE A WEEK
IN A TYPICAL WEEK, HOW MANY TIMES DO YOU TALK ON THE PHONE WITH FAMILY, FRIENDS, OR NEIGHBORS?: MORE THAN THREE TIMES A WEEK
HOW OFTEN DO YOU ATTEND CHURCH OR RELIGIOUS SERVICES?: NEVER
HOW OFTEN DO YOU ATTENT MEETINGS OF THE CLUB OR ORGANIZATION YOU BELONG TO?: NEVER
HOW OFTEN DO YOU HAVE SIX OR MORE DRINKS ON ONE OCCASION: LESS THAN MONTHLY
WITHIN THE PAST 12 MONTHS, YOU WORRIED THAT YOUR FOOD WOULD RUN OUT BEFORE YOU GOT THE MONEY TO BUY MORE: NEVER TRUE
DO YOU BELONG TO ANY CLUBS OR ORGANIZATIONS SUCH AS CHURCH GROUPS UNIONS, FRATERNAL OR ATHLETIC GROUPS, OR SCHOOL GROUPS?: NO
IN A TYPICAL WEEK, HOW MANY TIMES DO YOU TALK ON THE PHONE WITH FAMILY, FRIENDS, OR NEIGHBORS?: MORE THAN THREE TIMES A WEEK
HOW OFTEN DO YOU ATTENT MEETINGS OF THE CLUB OR ORGANIZATION YOU BELONG TO?: NEVER
HOW OFTEN DO YOU GET TOGETHER WITH FRIENDS OR RELATIVES?: ONCE A WEEK
HOW MANY DRINKS CONTAINING ALCOHOL DO YOU HAVE ON A TYPICAL DAY WHEN YOU ARE DRINKING: 1 OR 2
HOW HARD IS IT FOR YOU TO PAY FOR THE VERY BASICS LIKE FOOD, HOUSING, MEDICAL CARE, AND HEATING?: NOT HARD AT ALL
HOW OFTEN DO YOU HAVE A DRINK CONTAINING ALCOHOL: 2-3 TIMES A WEEK
DO YOU BELONG TO ANY CLUBS OR ORGANIZATIONS SUCH AS CHURCH GROUPS UNIONS, FRATERNAL OR ATHLETIC GROUPS, OR SCHOOL GROUPS?: NO

## 2023-04-08 ASSESSMENT — LIFESTYLE VARIABLES
SKIP TO QUESTIONS 9-10: 0
HOW OFTEN DO YOU HAVE A DRINK CONTAINING ALCOHOL: 2-3 TIMES A WEEK
HOW OFTEN DO YOU HAVE SIX OR MORE DRINKS ON ONE OCCASION: LESS THAN MONTHLY
HOW MANY STANDARD DRINKS CONTAINING ALCOHOL DO YOU HAVE ON A TYPICAL DAY: 1 OR 2
AUDIT-C TOTAL SCORE: 4

## 2023-04-11 ENCOUNTER — OFFICE VISIT (OUTPATIENT)
Dept: MEDICAL GROUP | Age: 78
End: 2023-04-11
Payer: MEDICARE

## 2023-04-11 VITALS
HEART RATE: 68 BPM | TEMPERATURE: 98 F | DIASTOLIC BLOOD PRESSURE: 78 MMHG | OXYGEN SATURATION: 98 % | BODY MASS INDEX: 34.38 KG/M2 | SYSTOLIC BLOOD PRESSURE: 120 MMHG | HEIGHT: 63 IN | WEIGHT: 194 LBS

## 2023-04-11 DIAGNOSIS — E55.9 VITAMIN D DEFICIENCY: ICD-10-CM

## 2023-04-11 DIAGNOSIS — E78.2 MIXED HYPERLIPIDEMIA: ICD-10-CM

## 2023-04-11 DIAGNOSIS — E11.65 UNCONTROLLED TYPE 2 DIABETES MELLITUS WITH HYPERGLYCEMIA (HCC): ICD-10-CM

## 2023-04-11 DIAGNOSIS — Z12.12 SCREENING FOR COLORECTAL CANCER: ICD-10-CM

## 2023-04-11 DIAGNOSIS — Z00.00 MEDICARE ANNUAL WELLNESS VISIT, SUBSEQUENT: ICD-10-CM

## 2023-04-11 DIAGNOSIS — I10 ESSENTIAL HYPERTENSION: ICD-10-CM

## 2023-04-11 DIAGNOSIS — Z12.11 SCREENING FOR COLORECTAL CANCER: ICD-10-CM

## 2023-04-11 PROCEDURE — 99214 OFFICE O/P EST MOD 30 MIN: CPT | Mod: 25 | Performed by: INTERNAL MEDICINE

## 2023-04-11 PROCEDURE — G0439 PPPS, SUBSEQ VISIT: HCPCS | Performed by: INTERNAL MEDICINE

## 2023-04-11 RX ORDER — PIOGLITAZONEHYDROCHLORIDE 45 MG/1
45 TABLET ORAL DAILY
Qty: 90 TABLET | Refills: 3
Start: 2023-04-11 | End: 2023-10-17 | Stop reason: SDUPTHER

## 2023-04-11 RX ORDER — PIOGLITAZONEHYDROCHLORIDE 45 MG/1
45 TABLET ORAL
Qty: 90 TABLET | Refills: 3
Start: 2023-04-11 | End: 2023-04-11 | Stop reason: SDUPTHER

## 2023-04-11 RX ORDER — ATORVASTATIN CALCIUM 40 MG/1
40 TABLET, FILM COATED ORAL NIGHTLY
Qty: 90 TABLET | Refills: 4 | Status: SHIPPED | OUTPATIENT
Start: 2023-04-11 | End: 2023-10-17 | Stop reason: SDUPTHER

## 2023-04-11 RX ORDER — MULTIVIT-MIN/IRON/FOLIC ACID/K 18-600-40
2000 CAPSULE ORAL DAILY
Qty: 100 CAPSULE | Refills: 4 | Status: SHIPPED | OUTPATIENT
Start: 2023-04-11 | End: 2023-10-17 | Stop reason: SDUPTHER

## 2023-04-11 RX ORDER — LOSARTAN POTASSIUM 100 MG/1
100 TABLET ORAL DAILY
Qty: 90 TABLET | Refills: 4 | Status: SHIPPED | OUTPATIENT
Start: 2023-04-11 | End: 2023-10-17 | Stop reason: SDUPTHER

## 2023-04-11 ASSESSMENT — PATIENT HEALTH QUESTIONNAIRE - PHQ9: CLINICAL INTERPRETATION OF PHQ2 SCORE: 0

## 2023-04-11 ASSESSMENT — ACTIVITIES OF DAILY LIVING (ADL): BATHING_REQUIRES_ASSISTANCE: 0

## 2023-04-11 ASSESSMENT — FIBROSIS 4 INDEX: FIB4 SCORE: 1.79

## 2023-04-11 ASSESSMENT — ENCOUNTER SYMPTOMS: GENERAL WELL-BEING: GOOD

## 2023-04-11 NOTE — PROGRESS NOTES
Chief Complaint   Patient presents with    Annual Exam     Lab review        HPI:  Pooja is a 77 y.o. here for Medicare Annual Wellness Visit    But also needs review lab results and management of her diabetes and dyslipidemia.    Patient Active Problem List    Diagnosis Date Noted    Class 1 obesity due to excess calories with serious comorbidity and body mass index (BMI) of 33.0 to 33.9 in adult 01/12/2023    Benign meningioma (HCC) 05/23/2018    DDD (degenerative disc disease), lumbar 06/29/2016    Controlled type 2 diabetes mellitus with complication, without long-term current use of insulin (HCC) 10/27/2015    Osteoporosis, post-menopausal- since about 2002; on fosamax since early 2000s 09/17/2014    Mixed hyperlipidemia 08/27/2014    Essential hypertension 08/27/2014    Primary osteoarthritis involving multiple joints 08/27/2014    History of breast cancer- 2001; RT and lumpectomy; right side; neg nodes; tamoxifen x 5 yrs 08/27/2014    Vitamin D deficiency 08/27/2014       Current Outpatient Medications   Medication Sig Dispense Refill    metFORMIN (GLUCOPHAGE) 500 MG Tab Take 1 Tablet by mouth 2 times a day with meals. 180 Tablet 4    losartan (COZAAR) 100 MG Tab Take 1 Tablet by mouth every day. 90 Tablet 4    atorvastatin (LIPITOR) 20 MG Tab Take 1 Tablet by mouth every day. 90 Tablet 4    Cholecalciferol (VITAMIN D) 50 MCG (2000 UT) Cap Take 1 Capsule by mouth every day. 100 Capsule 4    glucosamine Sulfate 500 MG Cap Take 500 mg by mouth 3 times a day, with meals.      Calcium Carbonate-Vitamin D (CALCIUM + D PO) Take  by mouth 2 Times a Day.      pioglitazone (ACTOS) 45 MG Tab Take 0.5 Tablets by mouth every day. (Patient not taking: Reported on 4/11/2023) 90 Tablet 4     No current facility-administered medications for this visit.        Patient is taking medications as noted in medication list.  Current supplements as per medication list.     Allergies: Other environmental    Current social  contact/activities:  family     Is patient current with immunizations? Yes.    She  reports that she has never smoked. She has never used smokeless tobacco. She reports current alcohol use of about 1.0 oz per week. She reports that she does not use drugs.  Counseling given: Not Answered      ROS:    Gait: Uses no assistive device   Ostomy: No   Other tubes: No   Amputations: No   Chronic oxygen use No   Last eye exam  08/2022  Wears hearing aids: No   : Denies any urinary leakage during the last 6 months    Screening:    Depression Screening  Little interest or pleasure in doing things?  0 - not at all  Feeling down, depressed, or hopeless? 0 - not at all  Patient Health Questionnaire Score: 0    If depressive symptoms identified deferred to follow up visit unless specifically addressed in assessment and plan.    Interpretation of PHQ-9 Total Score   Score Severity   1-4 No Depression   5-9 Mild Depression   10-14 Moderate Depression   15-19 Moderately Severe Depression   20-27 Severe Depression    Screening for Cognitive Impairment  Three Minute Recall (daughter, heaven, mountain)  3/3    Tristan clock face with all 12 numbers and set the hands to show 10 past 11.  No    If cognitive concerns identified, deferred for follow up unless specifically addressed in assessment and plan.    Fall Risk Assessment  Has the patient had two or more falls in the last year or any fall with injury in the last year?  No  If fall risk identified, deferred for follow up unless specifically addressed in assessment and plan.    Safety Assessment  Throw rugs on floor.  Yes  Handrails on all stairs.  No  Good lighting in all hallways.  Yes  Difficulty hearing.  No  Patient counseled about all safety risks that were identified.    Functional Assessment ADLs  Are there any barriers preventing you from cooking for yourself or meeting nutritional needs?  No.    Are there any barriers preventing you from driving safely or obtaining  transportation?  No.    Are there any barriers preventing you from using a telephone or calling for help?  No.    Are there any barriers preventing you from shopping?  No.    Are there any barriers preventing you from taking care of your own finances?  No.    Are there any barriers preventing you from managing your medications?  No.    Are there any barriers preventing you from showering, bathing or dressing yourself?  No.    Are you currently engaging in any exercise or physical activity?  Yes.     What is your perception of your health?  Good.    Advance Care Planning  Do you have an Advance Directive, Living Will, Durable Power of , or POLST? Yes      Durable Power of         Health Maintenance Summary            Overdue - IMM ZOSTER VACCINES (2 of 3) Overdue since 7/4/2013 05/09/2013  Imm Admin: Zoster Vaccine Live (ZVL) (Zostavax) - HISTORICAL DATA              Overdue - Annual Wellness Visit (Every 366 Days) Overdue since 8/21/2016 08/21/2015  Visit Dx: Encounter for Medicare annual wellness exam              Overdue - IMM DTaP/Tdap/Td Vaccine (1 - Tdap) Overdue since 9/17/2018 09/16/2018  Imm Admin: TD Vaccine              Overdue - COLORECTAL CANCER SCREENING (COLONOSCOPY - Every 10 Years) Overdue since 1/16/2023 01/16/2013  AMB REFERRAL TO GI FOR COLONOSCOPY              RETINAL SCREENING (Yearly) Next due on 8/11/2023 08/11/2022  Referral for Retinal Screening Exam    08/11/2022  REFERRAL FOR RETINAL SCREENING EXAM    03/25/2020  REFERRAL FOR RETINAL SCREENING EXAM    09/20/2018  REFERRAL FOR RETINAL SCREENING EXAM    06/29/2017  REFERRAL FOR RETINAL SCREENING EXAM    Only the first 5 history entries have been loaded, but more history exists.              A1C SCREENING (Every 6 Months) Next due on 10/3/2023      04/03/2023  HEMOGLOBIN A1C    10/17/2022  HEMOGLOBIN A1C    03/01/2022  HEMOGLOBIN A1C    08/25/2021  HEMOGLOBIN A1C    01/26/2021  HEMOGLOBIN A1C    Only  the first 5 history entries have been loaded, but more history exists.              DIABETES MONOFILAMENT / LE EXAM (Yearly) Next due on 1/12/2024 01/12/2023  Diabetic Monofilament Lower Extremity Exam    03/04/2021  Diabetic Monofilament Lower Extremity Exam    07/29/2020  Done    05/23/2018  Diabetic Monofilament Lower Extremity Exam    05/09/2017  Diabetic Monofilament Lower Extremity Exam    Only the first 5 history entries have been loaded, but more history exists.              FASTING LIPID PROFILE (Yearly) Next due on 4/3/2024      04/03/2023  Lipid Profile    10/17/2022  Lipid Profile    03/01/2022  Lipid Profile    08/25/2021  Lipid Profile    01/26/2021  Lipid Profile    Only the first 5 history entries have been loaded, but more history exists.              URINE ACR / MICROALBUMIN (Yearly) Next due on 4/3/2024      04/03/2023  MICROALBUMIN CREAT RATIO URINE    10/17/2022  MICROALBUMIN CREAT RATIO URINE    08/25/2021  MICROALBUMIN CREAT RATIO URINE    01/26/2021  MICROALBUMIN CREAT RATIO URINE    07/17/2020  MICROALBUMIN CREAT RATIO URINE    Only the first 5 history entries have been loaded, but more history exists.              SERUM CREATININE (Yearly) Next due on 4/3/2024      04/03/2023  Comp Metabolic Panel    10/17/2022  Comp Metabolic Panel    03/01/2022  Comp Metabolic Panel    08/25/2021  Comp Metabolic Panel    01/26/2021  Comp Metabolic Panel    Only the first 5 history entries have been loaded, but more history exists.              BONE DENSITY (Every 5 Years) Next due on 9/27/2024 09/27/2019  DS-BONE DENSITY STUDY (DEXA)    11/10/2016  DS-BONE DENSITY STUDY (DEXA)    09/02/2014  DS-BONE DENSITY STUDY (DEXA)    08/27/2004  Previously completed              IMM PNEUMOCOCCAL VACCINE: 65+ Years (Series Information) Completed      03/30/2015  Imm Admin: Pneumococcal Conjugate Vaccine (Prevnar/PCV-13)    04/27/2013  Imm Admin: Pneumococcal polysaccharide vaccine (PPSV-23)               HEPATITIS C SCREENING  Completed      01/26/2021  HEP C VIRUS ANTIBODY              IMM INFLUENZA (Series Information) Completed      09/22/2022  Imm Admin: Influenza, Unspecified - HISTORICAL DATA    10/18/2021  Imm Admin: Influenza, Unspecified - HISTORICAL DATA    11/11/2020  Imm Admin: Influenza Vaccine Quad Inj (Pf)    10/21/2019  Imm Admin: Influenza Vaccine Adult HD    09/28/2018  Imm Admin: Influenza Vaccine Adult HD    Only the first 5 history entries have been loaded, but more history exists.              COVID-19 Vaccine (Series Information) Completed      11/10/2022  Imm Admin: PFIZER BIVALENT BOOSTER SARS-COV-2 VACCINE (12+)    04/18/2022  Imm Admin: PFIZER GOLDSMITH CAP SARS-COV-2 VACCINATION (12+)    11/11/2021  Imm Admin: PFIZER PURPLE CAP SARS-COV-2 VACCINATION (12+)    02/11/2021  Imm Admin: PFIZER PURPLE CAP SARS-COV-2 VACCINATION (12+)    01/21/2021  Imm Admin: PFIZER PURPLE CAP SARS-COV-2 VACCINATION (12+)              IMM HEP B VACCINE (Series Information) Aged Out      No completion history exists for this topic.              IMM MENINGOCOCCAL ACWY VACCINE (Series Information) Aged Out      No completion history exists for this topic.              Discontinued - MAMMOGRAM  Discontinued        Frequency changed to Never automatically (Topic No Longer Applies)    02/08/2023  MA-SCREENING MAMMO BILAT W/TOMOSYNTHESIS W/CAD    02/07/2022  MA-SCREENING MAMMO BILAT W/TOMOSYNTHESIS W/CAD    12/18/2020  MA-SCREENING MAMMO BILAT W/TOMOSYNTHESIS W/CAD    09/27/2019  MA-SCREENING MAMMO BILAT W/TOMOSYNTHESIS W/CAD    Only the first 5 history entries have been loaded, but more history exists.              Discontinued - CERVICAL CANCER SCREENING  Discontinued        Frequency changed to Never automatically (Topic No Longer Applies)    03/09/2016  Done    03/09/2016  PATHOLOGY GYN SPECIMEN    03/09/2016  THINPREP PAP,REFLEX HPV ON ASC-US ONLY                    Patient Care Team:  Saurabh Ventura M.D. as PCP -  "General (Internal Medicine)  Zackery Martínez D.O. (Inactive) (Gastroenterology)  Marquez Hernandez M.D. (Inactive) (Ophthalmology)    Social History     Tobacco Use    Smoking status: Never    Smokeless tobacco: Never   Vaping Use    Vaping Use: Never used   Substance Use Topics    Alcohol use: Yes     Alcohol/week: 1.0 oz     Types: 1 Glasses of wine, 1 Cans of beer per week     Comment: occasional    Drug use: No     Family History   Problem Relation Age of Onset    Stroke Mother     Hypertension Mother     Heart Attack Father     Diabetes Father     Hypertension Father     Hyperlipidemia Brother     Stroke Brother     Heart Disease Brother      She  has a past medical history of Allergy, Diabetes (HCC), Hyperlipidemia, Hypertension, and Osteoporosis.   Past Surgical History:   Procedure Laterality Date    BREAST BIOPSY  2001    LUMPECTOMY  2001    right breast CA     CHOLECYSTECTOMY      MASTECTOMY Right     partial        Exam:   Ht 1.6 m (5' 3\")   Wt 88 kg (194 lb)  Body mass index is 34.37 kg/m².    Hearing good.    Dentition good  Alert, oriented in no acute distress.  Eye contact is good, speech goal directed, affect calm    Hospital Outpatient Visit on 04/03/2023   Component Date Value    25-Hydroxy   Vitamin D 25 04/03/2023 39     Creatinine, Urine 04/03/2023 104.84     Microalbumin, Urine Rand* 04/03/2023 1.6     Micro Alb Creat Ratio 04/03/2023 15     Glycohemoglobin 04/03/2023 6.4 (H)     Est Avg Glucose 04/03/2023 137     WBC 04/03/2023 5.4     RBC 04/03/2023 5.18     Hemoglobin 04/03/2023 14.9     Hematocrit 04/03/2023 45.9     MCV 04/03/2023 88.6     MCH 04/03/2023 28.8     MCHC 04/03/2023 32.5 (L)     RDW 04/03/2023 42.5     Platelet Count 04/03/2023 158 (L)     MPV 04/03/2023 12.4     Neutrophils-Polys 04/03/2023 65.20     Lymphocytes 04/03/2023 19.30 (L)     Monocytes 04/03/2023 7.00     Eosinophils 04/03/2023 7.00 (H)     Basophils 04/03/2023 1.10     Immature Granulocytes 04/03/2023 0.40     " Nucleated RBC 04/03/2023 0.00     Neutrophils (Absolute) 04/03/2023 3.54     Lymphs (Absolute) 04/03/2023 1.05     Monos (Absolute) 04/03/2023 0.38     Eos (Absolute) 04/03/2023 0.38     Baso (Absolute) 04/03/2023 0.06     Immature Granulocytes (a* 04/03/2023 0.02     NRBC (Absolute) 04/03/2023 0.00     Cholesterol,Tot 04/03/2023 150     Triglycerides 04/03/2023 108     HDL 04/03/2023 56     LDL 04/03/2023 72     Sodium 04/03/2023 143     Potassium 04/03/2023 4.7     Chloride 04/03/2023 106     Co2 04/03/2023 26     Anion Gap 04/03/2023 11.0     Glucose 04/03/2023 141 (H)     Bun 04/03/2023 22     Creatinine 04/03/2023 0.69     Calcium 04/03/2023 9.0     AST(SGOT) 04/03/2023 20     ALT(SGPT) 04/03/2023 28     Alkaline Phosphatase 04/03/2023 61     Total Bilirubin 04/03/2023 0.2     Albumin 04/03/2023 4.2     Total Protein 04/03/2023 6.9     Globulin 04/03/2023 2.7     A-G Ratio 04/03/2023 1.6     TSH 04/03/2023 3.340     Fasting Status 04/03/2023 Fasting     Correct Calcium 04/03/2023 8.8     GFR (CKD-EPI) 04/03/2023 90       Lab Results   Component Value Date/Time    HBA1C 6.4 (H) 04/03/2023 10:13 AM    HBA1C 6.2 (H) 10/17/2022 09:40 AM     Lab Results   Component Value Date/Time    SODIUM 143 04/03/2023 10:13 AM    POTASSIUM 4.7 04/03/2023 10:13 AM    CHLORIDE 106 04/03/2023 10:13 AM    CO2 26 04/03/2023 10:13 AM    GLUCOSE 141 (H) 04/03/2023 10:13 AM    BUN 22 04/03/2023 10:13 AM    CREATININE 0.69 04/03/2023 10:13 AM    ALKPHOSPHAT 61 04/03/2023 10:13 AM    ASTSGOT 20 04/03/2023 10:13 AM    ALTSGPT 28 04/03/2023 10:13 AM    TBILIRUBIN 0.2 04/03/2023 10:13 AM     No results found for: INR  Lab Results   Component Value Date/Time    CHOLSTRLTOT 150 04/03/2023 10:13 AM    LDL 72 04/03/2023 10:13 AM    HDL 56 04/03/2023 10:13 AM    TRIGLYCERIDE 108 04/03/2023 10:13 AM       No results found for: TESTOSTERONE  No results found for: TSH  Lab Results   Component Value Date/Time    FREET4 0.97 09/09/2014 09:25 AM     No  results found for: URICACID  No components found for: VITB12  Lab Results   Component Value Date/Time    25HYDROXY 39 04/03/2023 10:13 AM    25HYDROXY 41 10/17/2022 09:40 AM     Outpatient Medications Prior to Visit   Medication Sig Dispense Refill    metFORMIN (GLUCOPHAGE) 500 MG Tab Take 1 Tablet by mouth 2 times a day with meals. 180 Tablet 4    losartan (COZAAR) 100 MG Tab Take 1 Tablet by mouth every day. 90 Tablet 4    atorvastatin (LIPITOR) 20 MG Tab Take 1 Tablet by mouth every day. 90 Tablet 4    pioglitazone (ACTOS) 45 MG Tab Take 0.5 Tablets by mouth every day. (Patient not taking: Reported on 4/11/2023) 90 Tablet 4    Cholecalciferol (VITAMIN D) 50 MCG (2000 UT) Cap Take 1 Capsule by mouth every day. 100 Capsule 4    glucosamine Sulfate 500 MG Cap Take 500 mg by mouth 3 times a day, with meals.      Calcium Carbonate-Vitamin D (CALCIUM + D PO) Take  by mouth 2 Times a Day.       No facility-administered medications prior to visit.         Assessment and Plan. The following treatment and monitoring plan is recommended:    1. Medicare annual wellness visit, subsequent  Completed.  All metrics up-to-date except for need for colon cancer screening.  And shingles vaccine.  Both which were ordered.    2. Essential hypertension  Under good control continue current regimen  - losartan (COZAAR) 100 MG Tab; Take 1 Tablet by mouth every day.  Dispense: 90 Tablet; Refill: 4    3. Uncontrolled type 2 diabetes mellitus with hyperglycemia (HCC)  Not at goal.  Patient advised on diet and exercise and continuation of her metformin and pioglitazone, but increase her pioglitazone to a whole pill a day..  If she is unable to afford her pioglitazone we will or switching her to glipizide.  She will let us know.  Otherwise she will stay on the pioglitazone and metformin as previously prescribed and work on diet and exercise to improve her A1c.  - pioglitazone (ACTOS) 45 MG Tab; Take 1 Tablet by mouth every 24 hours.  Dispense:  90 Tablet; Refill: 3  - metFORMIN (GLUCOPHAGE) 500 MG Tab; Take 1 Tablet by mouth 2 times a day with meals.  Dispense: 180 Tablet; Refill: 4  - Comp Metabolic Panel; Future  - Lipid Profile; Future  - MICROALBUMIN CREAT RATIO URINE; Future  - HEMOGLOBIN A1C; Future    4. Mixed hyperlipidemia  Not at goal.  Increase Lipitor to 40 mg daily  - atorvastatin (LIPITOR) 40 MG Tab; Take 1 Tablet by mouth every evening.  Dispense: 90 Tablet; Refill: 4  - CBC WITH DIFFERENTIAL; Future  - TSH; Future  - Comp Metabolic Panel; Future  - Lipid Profile; Future    5. Screening for colorectal cancer     - Referral to GI for Colonoscopy    6. Vitamin D deficiency  Good control continue current regimen.  - VITAMIN D,25 HYDROXY (DEFICIENCY); Future  - Cholecalciferol (VITAMIN D) 50 MCG (2000 UT) Cap; Take 1 Capsule by mouth every day.  Dispense: 100 Capsule; Refill: 4          Services suggested: No services needed at this time  Health Care Screening recommendations as per orders if indicated.  Referrals offered: PT/OT/Nutrition counseling/Behavioral Health/Smoking cessation as per orders if indicated.    Discussion today about general wellness and lifestyle habits:    Prevent falls and reduce trip hazards; Cautioned about securing or removing rugs.  Have a working fire alarm and carbon monoxide detector;   Engage in regular physical activity and social activities     Follow-up: No follow-ups on file.

## 2023-10-06 ENCOUNTER — HOSPITAL ENCOUNTER (OUTPATIENT)
Dept: LAB | Facility: MEDICAL CENTER | Age: 78
End: 2023-10-06
Attending: INTERNAL MEDICINE
Payer: MEDICARE

## 2023-10-06 DIAGNOSIS — E55.9 VITAMIN D DEFICIENCY: ICD-10-CM

## 2023-10-06 DIAGNOSIS — E78.2 MIXED HYPERLIPIDEMIA: ICD-10-CM

## 2023-10-06 DIAGNOSIS — E11.65 UNCONTROLLED TYPE 2 DIABETES MELLITUS WITH HYPERGLYCEMIA (HCC): ICD-10-CM

## 2023-10-06 LAB
25(OH)D3 SERPL-MCNC: 46 NG/ML (ref 30–100)
ALBUMIN SERPL BCP-MCNC: 4.1 G/DL (ref 3.2–4.9)
ALBUMIN/GLOB SERPL: 1.5 G/DL
ALP SERPL-CCNC: 62 U/L (ref 30–99)
ALT SERPL-CCNC: 24 U/L (ref 2–50)
ANION GAP SERPL CALC-SCNC: 10 MMOL/L (ref 7–16)
AST SERPL-CCNC: 18 U/L (ref 12–45)
BASOPHILS # BLD AUTO: 0.6 % (ref 0–1.8)
BASOPHILS # BLD: 0.03 K/UL (ref 0–0.12)
BILIRUB SERPL-MCNC: 0.5 MG/DL (ref 0.1–1.5)
BUN SERPL-MCNC: 20 MG/DL (ref 8–22)
CALCIUM ALBUM COR SERPL-MCNC: 9.9 MG/DL (ref 8.5–10.5)
CALCIUM SERPL-MCNC: 10 MG/DL (ref 8.5–10.5)
CHLORIDE SERPL-SCNC: 106 MMOL/L (ref 96–112)
CHOLEST SERPL-MCNC: 122 MG/DL (ref 100–199)
CO2 SERPL-SCNC: 28 MMOL/L (ref 20–33)
CREAT SERPL-MCNC: 0.78 MG/DL (ref 0.5–1.4)
CREAT UR-MCNC: 100.62 MG/DL
EOSINOPHIL # BLD AUTO: 0.38 K/UL (ref 0–0.51)
EOSINOPHIL NFR BLD: 7.7 % (ref 0–6.9)
ERYTHROCYTE [DISTWIDTH] IN BLOOD BY AUTOMATED COUNT: 42.2 FL (ref 35.9–50)
EST. AVERAGE GLUCOSE BLD GHB EST-MCNC: 143 MG/DL
GFR SERPLBLD CREATININE-BSD FMLA CKD-EPI: 79 ML/MIN/1.73 M 2
GLOBULIN SER CALC-MCNC: 2.8 G/DL (ref 1.9–3.5)
GLUCOSE SERPL-MCNC: 130 MG/DL (ref 65–99)
HBA1C MFR BLD: 6.6 % (ref 4–5.6)
HCT VFR BLD AUTO: 44.6 % (ref 37–47)
HDLC SERPL-MCNC: 47 MG/DL
HGB BLD-MCNC: 14.1 G/DL (ref 12–16)
IMM GRANULOCYTES # BLD AUTO: 0.01 K/UL (ref 0–0.11)
IMM GRANULOCYTES NFR BLD AUTO: 0.2 % (ref 0–0.9)
LDLC SERPL CALC-MCNC: 46 MG/DL
LYMPHOCYTES # BLD AUTO: 1.03 K/UL (ref 1–4.8)
LYMPHOCYTES NFR BLD: 20.9 % (ref 22–41)
MCH RBC QN AUTO: 28.4 PG (ref 27–33)
MCHC RBC AUTO-ENTMCNC: 31.6 G/DL (ref 32.2–35.5)
MCV RBC AUTO: 89.7 FL (ref 81.4–97.8)
MICROALBUMIN UR-MCNC: <1.2 MG/DL
MICROALBUMIN/CREAT UR: NORMAL MG/G (ref 0–30)
MONOCYTES # BLD AUTO: 0.41 K/UL (ref 0–0.85)
MONOCYTES NFR BLD AUTO: 8.3 % (ref 0–13.4)
NEUTROPHILS # BLD AUTO: 3.08 K/UL (ref 1.82–7.42)
NEUTROPHILS NFR BLD: 62.3 % (ref 44–72)
NRBC # BLD AUTO: 0 K/UL
NRBC BLD-RTO: 0 /100 WBC (ref 0–0.2)
PLATELET # BLD AUTO: 169 K/UL (ref 164–446)
PMV BLD AUTO: 12.5 FL (ref 9–12.9)
POTASSIUM SERPL-SCNC: 4.2 MMOL/L (ref 3.6–5.5)
PROT SERPL-MCNC: 6.9 G/DL (ref 6–8.2)
RBC # BLD AUTO: 4.97 M/UL (ref 4.2–5.4)
SODIUM SERPL-SCNC: 144 MMOL/L (ref 135–145)
TRIGL SERPL-MCNC: 145 MG/DL (ref 0–149)
TSH SERPL DL<=0.005 MIU/L-ACNC: 3.22 UIU/ML (ref 0.38–5.33)
WBC # BLD AUTO: 4.9 K/UL (ref 4.8–10.8)

## 2023-10-06 PROCEDURE — 82306 VITAMIN D 25 HYDROXY: CPT

## 2023-10-06 PROCEDURE — 84443 ASSAY THYROID STIM HORMONE: CPT

## 2023-10-06 PROCEDURE — 82043 UR ALBUMIN QUANTITATIVE: CPT

## 2023-10-06 PROCEDURE — 85025 COMPLETE CBC W/AUTO DIFF WBC: CPT

## 2023-10-06 PROCEDURE — 36415 COLL VENOUS BLD VENIPUNCTURE: CPT | Mod: GA

## 2023-10-06 PROCEDURE — 82570 ASSAY OF URINE CREATININE: CPT

## 2023-10-06 PROCEDURE — 83036 HEMOGLOBIN GLYCOSYLATED A1C: CPT | Mod: GA

## 2023-10-06 PROCEDURE — 80061 LIPID PANEL: CPT

## 2023-10-06 PROCEDURE — 80053 COMPREHEN METABOLIC PANEL: CPT

## 2023-10-17 ENCOUNTER — OFFICE VISIT (OUTPATIENT)
Dept: MEDICAL GROUP | Age: 78
End: 2023-10-17
Payer: MEDICARE

## 2023-10-17 VITALS
HEIGHT: 63 IN | WEIGHT: 185 LBS | BODY MASS INDEX: 32.78 KG/M2 | DIASTOLIC BLOOD PRESSURE: 70 MMHG | TEMPERATURE: 98.1 F | OXYGEN SATURATION: 95 % | HEART RATE: 70 BPM | SYSTOLIC BLOOD PRESSURE: 120 MMHG

## 2023-10-17 DIAGNOSIS — E66.09 CLASS 1 OBESITY DUE TO EXCESS CALORIES WITH SERIOUS COMORBIDITY AND BODY MASS INDEX (BMI) OF 33.0 TO 33.9 IN ADULT: ICD-10-CM

## 2023-10-17 DIAGNOSIS — E55.9 VITAMIN D DEFICIENCY: ICD-10-CM

## 2023-10-17 DIAGNOSIS — D32.9 BENIGN MENINGIOMA (HCC): ICD-10-CM

## 2023-10-17 DIAGNOSIS — J30.1 NON-SEASONAL ALLERGIC RHINITIS DUE TO POLLEN: ICD-10-CM

## 2023-10-17 DIAGNOSIS — I10 ESSENTIAL HYPERTENSION: ICD-10-CM

## 2023-10-17 DIAGNOSIS — Z85.3 HISTORY OF BREAST CANCER: ICD-10-CM

## 2023-10-17 DIAGNOSIS — E78.2 MIXED HYPERLIPIDEMIA: ICD-10-CM

## 2023-10-17 DIAGNOSIS — E11.65 UNCONTROLLED TYPE 2 DIABETES MELLITUS WITH HYPERGLYCEMIA (HCC): ICD-10-CM

## 2023-10-17 DIAGNOSIS — Z23 NEED FOR VACCINATION: ICD-10-CM

## 2023-10-17 PROCEDURE — 99214 OFFICE O/P EST MOD 30 MIN: CPT | Performed by: INTERNAL MEDICINE

## 2023-10-17 PROCEDURE — 3074F SYST BP LT 130 MM HG: CPT | Performed by: INTERNAL MEDICINE

## 2023-10-17 PROCEDURE — 3078F DIAST BP <80 MM HG: CPT | Performed by: INTERNAL MEDICINE

## 2023-10-17 RX ORDER — LOSARTAN POTASSIUM 100 MG/1
100 TABLET ORAL DAILY
Qty: 90 TABLET | Refills: 4 | Status: SHIPPED | OUTPATIENT
Start: 2023-10-17 | End: 2024-01-17 | Stop reason: SDUPTHER

## 2023-10-17 RX ORDER — SEMAGLUTIDE 0.68 MG/ML
0.25 INJECTION, SOLUTION SUBCUTANEOUS
Qty: 12 ML | Refills: 11 | Status: SHIPPED | OUTPATIENT
Start: 2023-10-17 | End: 2023-10-17 | Stop reason: SDUPTHER

## 2023-10-17 RX ORDER — SEMAGLUTIDE 0.68 MG/ML
0.25 INJECTION, SOLUTION SUBCUTANEOUS
Qty: 12 ML | Refills: 11 | Status: SHIPPED | OUTPATIENT
Start: 2023-10-17 | End: 2024-01-17

## 2023-10-17 RX ORDER — MULTIVIT-MIN/IRON/FOLIC ACID/K 18-600-40
2000 CAPSULE ORAL DAILY
Qty: 100 CAPSULE | Refills: 4 | Status: SHIPPED | OUTPATIENT
Start: 2023-10-17 | End: 2024-01-17 | Stop reason: SDUPTHER

## 2023-10-17 RX ORDER — PIOGLITAZONEHYDROCHLORIDE 45 MG/1
45 TABLET ORAL DAILY
Qty: 90 TABLET | Refills: 4 | Status: SHIPPED | OUTPATIENT
Start: 2023-10-17 | End: 2023-10-17

## 2023-10-17 RX ORDER — ATORVASTATIN CALCIUM 40 MG/1
40 TABLET, FILM COATED ORAL NIGHTLY
Qty: 90 TABLET | Refills: 4 | Status: SHIPPED | OUTPATIENT
Start: 2023-10-17 | End: 2024-01-17 | Stop reason: SDUPTHER

## 2023-10-17 ASSESSMENT — ENCOUNTER SYMPTOMS
PSYCHIATRIC NEGATIVE: 1
RESPIRATORY NEGATIVE: 1
CONSTITUTIONAL NEGATIVE: 1
MUSCULOSKELETAL NEGATIVE: 1
CARDIOVASCULAR NEGATIVE: 1
GASTROINTESTINAL NEGATIVE: 1
EYES NEGATIVE: 1
NEUROLOGICAL NEGATIVE: 1

## 2023-10-17 ASSESSMENT — FIBROSIS 4 INDEX: FIB4 SCORE: 1.65

## 2023-10-17 NOTE — PROGRESS NOTES
Subjective     Pooja Llanos is a 78 y.o. female who presents with Follow-Up (Lab review )  The patient is here for followup of chronic medical problems listed below. The patient is compliant with medications and having no side effects from them. Denies chest pain, abdominal pain, dyspnea, myalgias, or cough.   Patient Active Problem List   Diagnosis    Mixed hyperlipidemia    Essential hypertension    Primary osteoarthritis involving multiple joints    History of breast cancer- 2001; RT and lumpectomy; right side; neg nodes; tamoxifen x 5 yrs    Vitamin D deficiency    Osteoporosis, post-menopausal- since about 2002; on fosamax since early 2000s    Uncontrolled type 2 diabetes mellitus with hyperglycemia (HCC)    DDD (degenerative disc disease), lumbar    Benign meningioma (HCC)    Class 1 obesity due to excess calories with serious comorbidity and body mass index (BMI) of 33.0 to 33.9 in adult    Non-seasonal allergic rhinitis due to pollen     Outpatient Medications Prior to Visit   Medication Sig Dispense Refill    metFORMIN (GLUCOPHAGE) 500 MG Tab Take 1 Tablet by mouth 2 times a day with meals. 180 Tablet 4    losartan (COZAAR) 100 MG Tab Take 1 Tablet by mouth every day. 90 Tablet 4    atorvastatin (LIPITOR) 40 MG Tab Take 1 Tablet by mouth every evening. 90 Tablet 4    Cholecalciferol (VITAMIN D) 50 MCG (2000 UT) Cap Take 1 Capsule by mouth every day. 100 Capsule 4    pioglitazone (ACTOS) 45 MG Tab Take 1 Tablet by mouth every day. 90 Tablet 3     No facility-administered medications prior to visit.     Hospital Outpatient Visit on 10/06/2023   Component Date Value    Glycohemoglobin 10/06/2023 6.6 (H)     Est Avg Glucose 10/06/2023 143     Creatinine, Urine 10/06/2023 100.62     Microalbumin, Urine Rand* 10/06/2023 <1.2     Micro Alb Creat Ratio 10/06/2023 see below     25-Hydroxy   Vitamin D 25 10/06/2023 46     Cholesterol,Tot 10/06/2023 122     Triglycerides 10/06/2023 145     HDL 10/06/2023 47      LDL 10/06/2023 46     Sodium 10/06/2023 144     Potassium 10/06/2023 4.2     Chloride 10/06/2023 106     Co2 10/06/2023 28     Anion Gap 10/06/2023 10.0     Glucose 10/06/2023 130 (H)     Bun 10/06/2023 20     Creatinine 10/06/2023 0.78     Calcium 10/06/2023 10.0     Correct Calcium 10/06/2023 9.9     AST(SGOT) 10/06/2023 18     ALT(SGPT) 10/06/2023 24     Alkaline Phosphatase 10/06/2023 62     Total Bilirubin 10/06/2023 0.5     Albumin 10/06/2023 4.1     Total Protein 10/06/2023 6.9     Globulin 10/06/2023 2.8     A-G Ratio 10/06/2023 1.5     TSH 10/06/2023 3.220     WBC 10/06/2023 4.9     RBC 10/06/2023 4.97     Hemoglobin 10/06/2023 14.1     Hematocrit 10/06/2023 44.6     MCV 10/06/2023 89.7     MCH 10/06/2023 28.4     MCHC 10/06/2023 31.6 (L)     RDW 10/06/2023 42.2     Platelet Count 10/06/2023 169     MPV 10/06/2023 12.5     Neutrophils-Polys 10/06/2023 62.30     Lymphocytes 10/06/2023 20.90 (L)     Monocytes 10/06/2023 8.30     Eosinophils 10/06/2023 7.70 (H)     Basophils 10/06/2023 0.60     Immature Granulocytes 10/06/2023 0.20     Nucleated RBC 10/06/2023 0.00     Neutrophils (Absolute) 10/06/2023 3.08     Lymphs (Absolute) 10/06/2023 1.03     Monos (Absolute) 10/06/2023 0.41     Eos (Absolute) 10/06/2023 0.38     Baso (Absolute) 10/06/2023 0.03     Immature Granulocytes (a* 10/06/2023 0.01     NRBC (Absolute) 10/06/2023 0.00     GFR (CKD-EPI) 10/06/2023 79       Lab Results   Component Value Date/Time    HBA1C 6.6 (H) 10/06/2023 10:12 AM    HBA1C 6.4 (H) 04/03/2023 10:13 AM     Lab Results   Component Value Date/Time    SODIUM 144 10/06/2023 10:12 AM    POTASSIUM 4.2 10/06/2023 10:12 AM    CHLORIDE 106 10/06/2023 10:12 AM    CO2 28 10/06/2023 10:12 AM    GLUCOSE 130 (H) 10/06/2023 10:12 AM    BUN 20 10/06/2023 10:12 AM    CREATININE 0.78 10/06/2023 10:12 AM    ALKPHOSPHAT 62 10/06/2023 10:12 AM    ASTSGOT 18 10/06/2023 10:12 AM    ALTSGPT 24 10/06/2023 10:12 AM    TBILIRUBIN 0.5 10/06/2023 10:12 AM  "    No results found for: \"INR\"  Lab Results   Component Value Date/Time    CHOLSTRLTOT 122 10/06/2023 10:12 AM    LDL 46 10/06/2023 10:12 AM    HDL 47 10/06/2023 10:12 AM    TRIGLYCERIDE 145 10/06/2023 10:12 AM       No results found for: \"TESTOSTERONE\"  No results found for: \"TSH\"  Lab Results   Component Value Date/Time    FREET4 0.97 09/09/2014 09:25 AM     No results found for: \"URICACID\"  No components found for: \"VITB12\"  Lab Results   Component Value Date/Time    25HYDROXY 46 10/06/2023 10:12 AM    25HYDROXY 39 04/03/2023 10:13 AM               HPI    Review of Systems   Constitutional: Negative.    HENT: Negative.     Eyes: Negative.    Respiratory: Negative.     Cardiovascular: Negative.    Gastrointestinal: Negative.    Genitourinary: Negative.    Musculoskeletal: Negative.    Skin: Negative.    Neurological: Negative.    Endo/Heme/Allergies: Negative.    Psychiatric/Behavioral: Negative.                Objective     /70 (BP Location: Right arm, Patient Position: Sitting, BP Cuff Size: Adult)   Pulse 70   Temp 36.7 °C (98.1 °F) (Temporal)   Ht 1.6 m (5' 3\")   Wt 83.9 kg (185 lb)   SpO2 95%   BMI 32.77 kg/m²      Physical Exam  Vitals reviewed.   Constitutional:       General: She is not in acute distress.     Appearance: She is well-developed. She is not diaphoretic.   HENT:      Head: Normocephalic and atraumatic.      Right Ear: External ear normal.      Left Ear: External ear normal.      Nose: Nose normal.      Mouth/Throat:      Pharynx: No oropharyngeal exudate.   Eyes:      General: No scleral icterus.        Right eye: No discharge.         Left eye: No discharge.      Conjunctiva/sclera: Conjunctivae normal.      Pupils: Pupils are equal, round, and reactive to light.   Neck:      Thyroid: No thyromegaly.      Vascular: No JVD.      Trachea: No tracheal deviation.   Cardiovascular:      Rate and Rhythm: Normal rate and regular rhythm.      Heart sounds: Normal heart sounds. No murmur " heard.     No friction rub. No gallop.   Pulmonary:      Effort: Pulmonary effort is normal. No respiratory distress.      Breath sounds: Normal breath sounds. No stridor. No wheezing or rales.   Chest:      Chest wall: No tenderness.   Abdominal:      General: Bowel sounds are normal. There is no distension.      Palpations: Abdomen is soft. There is no mass.      Tenderness: There is no abdominal tenderness. There is no guarding or rebound.   Musculoskeletal:         General: No tenderness. Normal range of motion.      Cervical back: Normal range of motion and neck supple.   Lymphadenopathy:      Cervical: No cervical adenopathy.   Skin:     General: Skin is warm and dry.      Coloration: Skin is not pale.      Findings: No erythema or rash.   Neurological:      Mental Status: She is alert and oriented to person, place, and time.      Cranial Nerves: No cranial nerve deficit.      Motor: No abnormal muscle tone.      Coordination: Coordination normal.      Deep Tendon Reflexes: Reflexes are normal and symmetric. Reflexes normal.   Psychiatric:         Behavior: Behavior normal.         Thought Content: Thought content normal.         Judgment: Judgment normal.                             Assessment & Plan        1. Uncontrolled type 2 diabetes mellitus with hyperglycemia (HCC)   Under good control. Continue same regimen.'  - metFORMIN (GLUCOPHAGE) 500 MG Tab; Take 1 Tablet by mouth 2 times a day with meals.  Dispense: 180 Tablet; Refill: 4  - Semaglutide,0.25 or 0.5MG/DOS, (OZEMPIC, 0.25 OR 0.5 MG/DOSE,) 2 MG/3ML Solution Pen-injector; Inject 0.25 mg under the skin every 7 days. X 4 wks, then 0.5 mg weekly sq thereafter  Dispense: 12 mL; Refill: 11  - HEMOGLOBIN A1C; Future  - MICROALBUMIN CREAT RATIO URINE; Future    2. Essential hypertension       Under good control. Continue same regimen.'  - losartan (COZAAR) 100 MG Tab; Take 1 Tablet by mouth every day.  Dispense: 90 Tablet; Refill: 4    3. Mixed  hyperlipidemia   Under good control. Continue same regimen.'     - atorvastatin (LIPITOR) 40 MG Tab; Take 1 Tablet by mouth every evening.  Dispense: 90 Tablet; Refill: 4  - CBC WITH DIFFERENTIAL; Future  - Comp Metabolic Panel; Future  - TSH; Future  - Lipid Profile; Future    4. Vitamin D deficiency   Under good control. Continue same regimen.'     - Cholecalciferol (VITAMIN D) 50 MCG (2000 UT) Cap; Take 1 Capsule by mouth every day.  Dispense: 100 Capsule; Refill: 4  - VITAMIN D,25 HYDROXY (DEFICIENCY); Future    5. History of breast cancer- 2001; RT and lumpectomy; right side; neg nodes; tamoxifen x 5 yrs           Under good control. Continue same regimen.'  Continue with surveillance.  Continue with low-fat diet and exercise and weight reduction    6. Benign meningioma (HCC)  Continue surveillance.  No recurrence no headaches no neurological symptoms suggest recurrence.    7. Class 1 obesity due to excess calories with serious comorbidity and body mass index (BMI) of 33.0 to 33.9 in adult  Ongoing chronic problem not responding to diet and exercise.  Feels that Actos has also put weight on her.    We will stop Actos and switch to semaglutide for better control of her diabetes and for her obesity.  We will start at 0.25 mg weekly for 4 weeks and then 0.5 mg weekly until followed up in 3 months.  Follow-up in 3 months with DM RN to consider increasing the dose beyond that level at that time.  Labs prior to visit.  - Semaglutide,0.25 or 0.5MG/DOS, (OZEMPIC, 0.25 OR 0.5 MG/DOSE,) 2 MG/3ML Solution Pen-injector; Inject 0.25 mg under the skin every 7 days. X 4 wks, then 0.5 mg weekly sq thereafter  Dispense: 12 mL; Refill: 11    8. Need for vaccination     - Shingrix Vaccine  - Hepatitis B Vaccine Adult 20+    9. Non-seasonal allergic rhinitis due to pollen  Continue with Flonase and Astelin nasal sprays over-the-counter

## 2023-11-29 ENCOUNTER — PATIENT MESSAGE (OUTPATIENT)
Dept: HEALTH INFORMATION MANAGEMENT | Facility: OTHER | Age: 78
End: 2023-11-29

## 2024-01-12 ENCOUNTER — HOSPITAL ENCOUNTER (OUTPATIENT)
Dept: LAB | Facility: MEDICAL CENTER | Age: 79
End: 2024-01-12
Attending: INTERNAL MEDICINE
Payer: MEDICARE

## 2024-01-12 DIAGNOSIS — E78.2 MIXED HYPERLIPIDEMIA: ICD-10-CM

## 2024-01-12 DIAGNOSIS — E55.9 VITAMIN D DEFICIENCY: ICD-10-CM

## 2024-01-12 DIAGNOSIS — E11.65 UNCONTROLLED TYPE 2 DIABETES MELLITUS WITH HYPERGLYCEMIA (HCC): ICD-10-CM

## 2024-01-12 LAB
ALBUMIN SERPL BCP-MCNC: 4.2 G/DL (ref 3.2–4.9)
ALBUMIN/GLOB SERPL: 1.6 G/DL
ALP SERPL-CCNC: 59 U/L (ref 30–99)
ALT SERPL-CCNC: 28 U/L (ref 2–50)
ANION GAP SERPL CALC-SCNC: 12 MMOL/L (ref 7–16)
AST SERPL-CCNC: 18 U/L (ref 12–45)
BASOPHILS # BLD AUTO: 1 % (ref 0–1.8)
BASOPHILS # BLD: 0.05 K/UL (ref 0–0.12)
BILIRUB SERPL-MCNC: 0.4 MG/DL (ref 0.1–1.5)
BUN SERPL-MCNC: 22 MG/DL (ref 8–22)
CALCIUM ALBUM COR SERPL-MCNC: 9.8 MG/DL (ref 8.5–10.5)
CALCIUM SERPL-MCNC: 10 MG/DL (ref 8.5–10.5)
CHLORIDE SERPL-SCNC: 103 MMOL/L (ref 96–112)
CHOLEST SERPL-MCNC: 115 MG/DL (ref 100–199)
CO2 SERPL-SCNC: 26 MMOL/L (ref 20–33)
CREAT SERPL-MCNC: 0.7 MG/DL (ref 0.5–1.4)
CREAT UR-MCNC: 91.35 MG/DL
EOSINOPHIL # BLD AUTO: 0.22 K/UL (ref 0–0.51)
EOSINOPHIL NFR BLD: 4.4 % (ref 0–6.9)
ERYTHROCYTE [DISTWIDTH] IN BLOOD BY AUTOMATED COUNT: 40 FL (ref 35.9–50)
EST. AVERAGE GLUCOSE BLD GHB EST-MCNC: 137 MG/DL
FASTING STATUS PATIENT QL REPORTED: NORMAL
GFR SERPLBLD CREATININE-BSD FMLA CKD-EPI: 89 ML/MIN/1.73 M 2
GLOBULIN SER CALC-MCNC: 2.7 G/DL (ref 1.9–3.5)
GLUCOSE SERPL-MCNC: 118 MG/DL (ref 65–99)
HBA1C MFR BLD: 6.4 % (ref 4–5.6)
HCT VFR BLD AUTO: 43.9 % (ref 37–47)
HDLC SERPL-MCNC: 45 MG/DL
HGB BLD-MCNC: 14.4 G/DL (ref 12–16)
IMM GRANULOCYTES # BLD AUTO: 0.01 K/UL (ref 0–0.11)
IMM GRANULOCYTES NFR BLD AUTO: 0.2 % (ref 0–0.9)
LDLC SERPL CALC-MCNC: 51 MG/DL
LYMPHOCYTES # BLD AUTO: 1.13 K/UL (ref 1–4.8)
LYMPHOCYTES NFR BLD: 22.4 % (ref 22–41)
MCH RBC QN AUTO: 28.7 PG (ref 27–33)
MCHC RBC AUTO-ENTMCNC: 32.8 G/DL (ref 32.2–35.5)
MCV RBC AUTO: 87.5 FL (ref 81.4–97.8)
MICROALBUMIN UR-MCNC: <1.2 MG/DL
MICROALBUMIN/CREAT UR: NORMAL MG/G (ref 0–30)
MONOCYTES # BLD AUTO: 0.45 K/UL (ref 0–0.85)
MONOCYTES NFR BLD AUTO: 8.9 % (ref 0–13.4)
NEUTROPHILS # BLD AUTO: 3.18 K/UL (ref 1.82–7.42)
NEUTROPHILS NFR BLD: 63.1 % (ref 44–72)
NRBC # BLD AUTO: 0 K/UL
NRBC BLD-RTO: 0 /100 WBC (ref 0–0.2)
PLATELET # BLD AUTO: 182 K/UL (ref 164–446)
PMV BLD AUTO: 12.1 FL (ref 9–12.9)
POTASSIUM SERPL-SCNC: 4.3 MMOL/L (ref 3.6–5.5)
PROT SERPL-MCNC: 6.9 G/DL (ref 6–8.2)
RBC # BLD AUTO: 5.02 M/UL (ref 4.2–5.4)
SODIUM SERPL-SCNC: 141 MMOL/L (ref 135–145)
TRIGL SERPL-MCNC: 93 MG/DL (ref 0–149)
WBC # BLD AUTO: 5 K/UL (ref 4.8–10.8)

## 2024-01-12 PROCEDURE — 83036 HEMOGLOBIN GLYCOSYLATED A1C: CPT | Mod: GA

## 2024-01-12 PROCEDURE — 82306 VITAMIN D 25 HYDROXY: CPT

## 2024-01-12 PROCEDURE — 80061 LIPID PANEL: CPT

## 2024-01-12 PROCEDURE — 82043 UR ALBUMIN QUANTITATIVE: CPT

## 2024-01-12 PROCEDURE — 36415 COLL VENOUS BLD VENIPUNCTURE: CPT | Mod: GA

## 2024-01-12 PROCEDURE — 82570 ASSAY OF URINE CREATININE: CPT

## 2024-01-12 PROCEDURE — 85025 COMPLETE CBC W/AUTO DIFF WBC: CPT

## 2024-01-12 PROCEDURE — 80053 COMPREHEN METABOLIC PANEL: CPT

## 2024-01-12 PROCEDURE — 84443 ASSAY THYROID STIM HORMONE: CPT

## 2024-01-13 LAB
25(OH)D3 SERPL-MCNC: 48 NG/ML (ref 30–100)
TSH SERPL DL<=0.005 MIU/L-ACNC: 4.3 UIU/ML (ref 0.38–5.33)

## 2024-01-17 ENCOUNTER — OFFICE VISIT (OUTPATIENT)
Dept: MEDICAL GROUP | Age: 79
End: 2024-01-17
Payer: MEDICARE

## 2024-01-17 VITALS
HEIGHT: 63 IN | WEIGHT: 178 LBS | OXYGEN SATURATION: 96 % | HEART RATE: 75 BPM | TEMPERATURE: 97.8 F | SYSTOLIC BLOOD PRESSURE: 130 MMHG | DIASTOLIC BLOOD PRESSURE: 62 MMHG | BODY MASS INDEX: 31.54 KG/M2

## 2024-01-17 DIAGNOSIS — E78.2 MIXED HYPERLIPIDEMIA: ICD-10-CM

## 2024-01-17 DIAGNOSIS — E11.65 UNCONTROLLED TYPE 2 DIABETES MELLITUS WITH HYPERGLYCEMIA (HCC): ICD-10-CM

## 2024-01-17 DIAGNOSIS — M15.9 PRIMARY OSTEOARTHRITIS INVOLVING MULTIPLE JOINTS: ICD-10-CM

## 2024-01-17 DIAGNOSIS — E55.9 VITAMIN D DEFICIENCY: ICD-10-CM

## 2024-01-17 DIAGNOSIS — I10 ESSENTIAL HYPERTENSION: ICD-10-CM

## 2024-01-17 DIAGNOSIS — E66.09 CLASS 1 OBESITY DUE TO EXCESS CALORIES WITH SERIOUS COMORBIDITY AND BODY MASS INDEX (BMI) OF 31.0 TO 31.9 IN ADULT: ICD-10-CM

## 2024-01-17 DIAGNOSIS — Z85.3 HISTORY OF BREAST CANCER: ICD-10-CM

## 2024-01-17 PROCEDURE — 3075F SYST BP GE 130 - 139MM HG: CPT | Performed by: INTERNAL MEDICINE

## 2024-01-17 PROCEDURE — 3078F DIAST BP <80 MM HG: CPT | Performed by: INTERNAL MEDICINE

## 2024-01-17 PROCEDURE — 99214 OFFICE O/P EST MOD 30 MIN: CPT | Performed by: INTERNAL MEDICINE

## 2024-01-17 RX ORDER — ATORVASTATIN CALCIUM 40 MG/1
40 TABLET, FILM COATED ORAL NIGHTLY
Qty: 90 TABLET | Refills: 4 | Status: SHIPPED | OUTPATIENT
Start: 2024-01-17

## 2024-01-17 RX ORDER — MULTIVIT-MIN/IRON/FOLIC ACID/K 18-600-40
2000 CAPSULE ORAL DAILY
Qty: 100 CAPSULE | Refills: 4 | Status: SHIPPED | OUTPATIENT
Start: 2024-01-17

## 2024-01-17 RX ORDER — LOSARTAN POTASSIUM 100 MG/1
100 TABLET ORAL DAILY
Qty: 90 TABLET | Refills: 4 | Status: SHIPPED | OUTPATIENT
Start: 2024-01-17

## 2024-01-17 ASSESSMENT — ENCOUNTER SYMPTOMS
NEUROLOGICAL NEGATIVE: 1
EYES NEGATIVE: 1
PSYCHIATRIC NEGATIVE: 1
GASTROINTESTINAL NEGATIVE: 1
RESPIRATORY NEGATIVE: 1
MUSCULOSKELETAL NEGATIVE: 1
CARDIOVASCULAR NEGATIVE: 1
CONSTITUTIONAL NEGATIVE: 1

## 2024-01-17 ASSESSMENT — PATIENT HEALTH QUESTIONNAIRE - PHQ9: CLINICAL INTERPRETATION OF PHQ2 SCORE: 0

## 2024-01-17 ASSESSMENT — FIBROSIS 4 INDEX: FIB4 SCORE: 1.42

## 2024-01-17 NOTE — PROGRESS NOTES
Subjective     Pooja Llanos is a 78 y.o. female who presents with Follow-Up (Lab review )  The patient is here for followup of chronic medical problems listed below. The patient is compliant with medications and having no side effects from them. Denies chest pain, abdominal pain, dyspnea, myalgias, or cough.     Since patient's last visit she has lost nearly 20 pounds on Wegovy 0.5 mg weekly.  She is ready to move to the 1 mg weekly dose at this time.  She is having a little bit of GI upset from time to time but no serious abdominal pain nausea vomiting diarrhea.            Patient Active Problem List   Diagnosis    Mixed hyperlipidemia    Essential hypertension    Primary osteoarthritis involving multiple joints    History of breast cancer- 2001; RT and lumpectomy; right side; neg nodes; tamoxifen x 5 yrs    Vitamin D deficiency    Osteoporosis, post-menopausal- since about 2002; on fosamax since early 2000s    Uncontrolled type 2 diabetes mellitus with hyperglycemia (HCC)    DDD (degenerative disc disease), lumbar    Benign meningioma (HCC)    Class 1 obesity due to excess calories with serious comorbidity and body mass index (BMI) of 31.0 to 31.9 in adult    Non-seasonal allergic rhinitis due to pollen    Obesity due to excess calories with serious comorbidity     01/17/2024  Outpatient Medications Prior to Visit   Medication Sig Dispense Refill    metFORMIN (GLUCOPHAGE) 500 MG Tab Take 1 Tablet by mouth 2 times a day with meals. 180 Tablet 4    losartan (COZAAR) 100 MG Tab Take 1 Tablet by mouth every day. 90 Tablet 4    atorvastatin (LIPITOR) 40 MG Tab Take 1 Tablet by mouth every evening. 90 Tablet 4    Cholecalciferol (VITAMIN D) 50 MCG (2000 UT) Cap Take 1 Capsule by mouth every day. 100 Capsule 4    Semaglutide,0.25 or 0.5MG/DOS, (OZEMPIC, 0.25 OR 0.5 MG/DOSE,) 2 MG/3ML Solution Pen-injector Inject 0.25 mg under the skin every 7 days. X 4 wks, then 0.5 mg weekly sq thereafter 12 mL 11     No  "facility-administered medications prior to visit.               HPI    Review of Systems   Constitutional: Negative.    HENT: Negative.     Eyes: Negative.    Respiratory: Negative.     Cardiovascular: Negative.    Gastrointestinal: Negative.    Genitourinary: Negative.    Musculoskeletal: Negative.    Skin: Negative.    Neurological: Negative.    Endo/Heme/Allergies: Negative.    Psychiatric/Behavioral: Negative.                Objective     /62 (BP Location: Right arm, Patient Position: Sitting, BP Cuff Size: Adult)   Pulse 75   Temp 36.6 °C (97.8 °F) (Temporal)   Ht 1.6 m (5' 3\")   Wt 80.7 kg (178 lb)   SpO2 96%   BMI 31.53 kg/m²      Physical Exam  Vitals reviewed.   Constitutional:       General: She is not in acute distress.     Appearance: She is well-developed. She is not diaphoretic.   HENT:      Head: Normocephalic and atraumatic.      Right Ear: External ear normal.      Left Ear: External ear normal.      Nose: Nose normal.      Mouth/Throat:      Pharynx: No oropharyngeal exudate.   Eyes:      General: No scleral icterus.        Right eye: No discharge.         Left eye: No discharge.      Conjunctiva/sclera: Conjunctivae normal.      Pupils: Pupils are equal, round, and reactive to light.   Neck:      Thyroid: No thyromegaly.      Vascular: No JVD.      Trachea: No tracheal deviation.   Cardiovascular:      Rate and Rhythm: Normal rate and regular rhythm.      Heart sounds: Normal heart sounds. No murmur heard.     No friction rub. No gallop.   Pulmonary:      Effort: Pulmonary effort is normal. No respiratory distress.      Breath sounds: Normal breath sounds. No stridor. No wheezing or rales.   Chest:      Chest wall: No tenderness.   Abdominal:      General: Bowel sounds are normal. There is no distension.      Palpations: Abdomen is soft. There is no mass.      Tenderness: There is no abdominal tenderness. There is no guarding or rebound.   Musculoskeletal:         General: No " tenderness. Normal range of motion.      Cervical back: Normal range of motion and neck supple.   Lymphadenopathy:      Cervical: No cervical adenopathy.   Skin:     General: Skin is warm and dry.      Coloration: Skin is not pale.      Findings: No erythema or rash.   Neurological:      Mental Status: She is alert and oriented to person, place, and time.      Cranial Nerves: No cranial nerve deficit.      Motor: No abnormal muscle tone.      Coordination: Coordination normal.      Deep Tendon Reflexes: Reflexes are normal and symmetric. Reflexes normal.   Psychiatric:         Behavior: Behavior normal.         Thought Content: Thought content normal.         Judgment: Judgment normal.       Hospital Outpatient Visit on 01/12/2024   Component Date Value    Creatinine, Urine 01/12/2024 91.35     Microalbumin, Urine Rand* 01/12/2024 <1.2     Micro Alb Creat Ratio 01/12/2024 see below     Glycohemoglobin 01/12/2024 6.4 (H)     Est Avg Glucose 01/12/2024 137     25-Hydroxy   Vitamin D 25 01/12/2024 48     Cholesterol,Tot 01/12/2024 115     Triglycerides 01/12/2024 93     HDL 01/12/2024 45     LDL 01/12/2024 51     TSH 01/12/2024 4.300     Sodium 01/12/2024 141     Potassium 01/12/2024 4.3     Chloride 01/12/2024 103     Co2 01/12/2024 26     Anion Gap 01/12/2024 12.0     Glucose 01/12/2024 118 (H)     Bun 01/12/2024 22     Creatinine 01/12/2024 0.70     Calcium 01/12/2024 10.0     Correct Calcium 01/12/2024 9.8     AST(SGOT) 01/12/2024 18     ALT(SGPT) 01/12/2024 28     Alkaline Phosphatase 01/12/2024 59     Total Bilirubin 01/12/2024 0.4     Albumin 01/12/2024 4.2     Total Protein 01/12/2024 6.9     Globulin 01/12/2024 2.7     A-G Ratio 01/12/2024 1.6     WBC 01/12/2024 5.0     RBC 01/12/2024 5.02     Hemoglobin 01/12/2024 14.4     Hematocrit 01/12/2024 43.9     MCV 01/12/2024 87.5     MCH 01/12/2024 28.7     MCHC 01/12/2024 32.8     RDW 01/12/2024 40.0     Platelet Count 01/12/2024 182     MPV 01/12/2024 12.1      "Neutrophils-Polys 01/12/2024 63.10     Lymphocytes 01/12/2024 22.40     Monocytes 01/12/2024 8.90     Eosinophils 01/12/2024 4.40     Basophils 01/12/2024 1.00     Immature Granulocytes 01/12/2024 0.20     Nucleated RBC 01/12/2024 0.00     Neutrophils (Absolute) 01/12/2024 3.18     Lymphs (Absolute) 01/12/2024 1.13     Monos (Absolute) 01/12/2024 0.45     Eos (Absolute) 01/12/2024 0.22     Baso (Absolute) 01/12/2024 0.05     Immature Granulocytes (a* 01/12/2024 0.01     NRBC (Absolute) 01/12/2024 0.00     Fasting Status 01/12/2024 Fasting     GFR (CKD-EPI) 01/12/2024 89       .alll  '  Lab Results   Component Value Date/Time    HBA1C 6.4 (H) 01/12/2024 09:10 AM    HBA1C 6.6 (H) 10/06/2023 10:12 AM     Lab Results   Component Value Date/Time    SODIUM 141 01/12/2024 09:10 AM    POTASSIUM 4.3 01/12/2024 09:10 AM    CHLORIDE 103 01/12/2024 09:10 AM    CO2 26 01/12/2024 09:10 AM    GLUCOSE 118 (H) 01/12/2024 09:10 AM    BUN 22 01/12/2024 09:10 AM    CREATININE 0.70 01/12/2024 09:10 AM    ALKPHOSPHAT 59 01/12/2024 09:10 AM    ASTSGOT 18 01/12/2024 09:10 AM    ALTSGPT 28 01/12/2024 09:10 AM    TBILIRUBIN 0.4 01/12/2024 09:10 AM     No results found for: \"INR\"  Lab Results   Component Value Date/Time    CHOLSTRLTOT 115 01/12/2024 09:10 AM    LDL 51 01/12/2024 09:10 AM    HDL 45 01/12/2024 09:10 AM    TRIGLYCERIDE 93 01/12/2024 09:10 AM       No results found for: \"TESTOSTERONE\"  No results found for: \"TSH\"  Lab Results   Component Value Date/Time    FREET4 0.97 09/09/2014 09:25 AM     No results found for: \"URICACID\"  No components found for: \"VITB12\"  Lab Results   Component Value Date/Time    25HYDROXY 48 01/12/2024 09:10 AM    25HYDROXY 46 10/06/2023 10:12 AM                           Assessment & Plan        1. Uncontrolled type 2 diabetes mellitus with hyperglycemia (HCC)  Under good control with A1c = 6.4.  Improved but still not at goal.  Continue current regimen except increase semaglutide to 1 mg weekly.  - " Diabetic Monofilament LE Exam  - metFORMIN (GLUCOPHAGE) 500 MG Tab; Take 1 Tablet by mouth 2 times a day with meals.  Dispense: 180 Tablet; Refill: 4  - Semaglutide, 1 MG/DOSE, 4 MG/3ML Solution Pen-injector; Inject 1 mg under the skin every 7 days.  Dispense: 12 mL; Refill: 3    2. History of breast cancer- 2001; RT and lumpectomy; right side; neg nodes; tamoxifen x 5 yrs  Good control no recurrence over the last 20 years.  Continue current regimen    3. Essential hypertension  Good control continue current regimen  - losartan (COZAAR) 100 MG Tab; Take 1 Tablet by mouth every day.  Dispense: 90 Tablet; Refill: 4    4. Class 1 obesity due to excess calories with serious comorbidity and body mass index (BMI) of 31.0 to 31.9 in adult  Not at goal.  Continue on semaglutide but increase the dose to 1 mg weekly as above  - Diabetic Monofilament LE Exam  - Patient identified as having weight management issue.  Appropriate orders and counseling given.    5. Vitamin D deficiency  Good control continue vitamin D3 2000 units daily  - Cholecalciferol (VITAMIN D) 50 MCG (2000 UT) Cap; Take 1 Capsule by mouth every day.  Dispense: 100 Capsule; Refill: 4    6. Primary osteoarthritis involving multiple joints  Good control continue current regimen.  Tylenol Extra Strength 2 tablets twice daily as needed    7. Mixed hyperlipidemia  Good control continue current regimen  - Diabetic Monofilament LE Exam  - atorvastatin (LIPITOR) 40 MG Tab; Take 1 Tablet by mouth every evening.  Dispense: 90 Tablet; Refill: 4      Return in 3 months for AWV and glycohemoglobin at that visit.  Repeat routine blood work in 6 months

## 2024-02-15 ENCOUNTER — HOSPITAL ENCOUNTER (OUTPATIENT)
Dept: RADIOLOGY | Facility: MEDICAL CENTER | Age: 79
End: 2024-02-15
Attending: INTERNAL MEDICINE
Payer: MEDICARE

## 2024-02-15 DIAGNOSIS — Z12.31 VISIT FOR SCREENING MAMMOGRAM: ICD-10-CM

## 2024-02-15 PROCEDURE — 77067 SCR MAMMO BI INCL CAD: CPT

## 2024-05-23 ENCOUNTER — APPOINTMENT (OUTPATIENT)
Dept: LAB | Facility: MEDICAL CENTER | Age: 79
End: 2024-05-23
Payer: MEDICARE

## 2024-05-28 SDOH — ECONOMIC STABILITY: FOOD INSECURITY: WITHIN THE PAST 12 MONTHS, THE FOOD YOU BOUGHT JUST DIDN'T LAST AND YOU DIDN'T HAVE MONEY TO GET MORE.: NEVER TRUE

## 2024-05-28 SDOH — ECONOMIC STABILITY: FOOD INSECURITY: WITHIN THE PAST 12 MONTHS, YOU WORRIED THAT YOUR FOOD WOULD RUN OUT BEFORE YOU GOT MONEY TO BUY MORE.: NEVER TRUE

## 2024-05-28 SDOH — ECONOMIC STABILITY: HOUSING INSECURITY

## 2024-05-28 SDOH — HEALTH STABILITY: PHYSICAL HEALTH: ON AVERAGE, HOW MANY DAYS PER WEEK DO YOU ENGAGE IN MODERATE TO STRENUOUS EXERCISE (LIKE A BRISK WALK)?: 3 DAYS

## 2024-05-28 SDOH — HEALTH STABILITY: PHYSICAL HEALTH: ON AVERAGE, HOW MANY MINUTES DO YOU ENGAGE IN EXERCISE AT THIS LEVEL?: 40 MIN

## 2024-05-28 SDOH — ECONOMIC STABILITY: INCOME INSECURITY: HOW HARD IS IT FOR YOU TO PAY FOR THE VERY BASICS LIKE FOOD, HOUSING, MEDICAL CARE, AND HEATING?: NOT HARD AT ALL

## 2024-05-28 SDOH — HEALTH STABILITY: MENTAL HEALTH
STRESS IS WHEN SOMEONE FEELS TENSE, NERVOUS, ANXIOUS, OR CAN'T SLEEP AT NIGHT BECAUSE THEIR MIND IS TROUBLED. HOW STRESSED ARE YOU?: ONLY A LITTLE

## 2024-05-28 SDOH — ECONOMIC STABILITY: HOUSING INSECURITY: IN THE LAST 12 MONTHS, HOW MANY PLACES HAVE YOU LIVED?: 1

## 2024-05-28 ASSESSMENT — SOCIAL DETERMINANTS OF HEALTH (SDOH)
WITHIN THE PAST 12 MONTHS, YOU WORRIED THAT YOUR FOOD WOULD RUN OUT BEFORE YOU GOT THE MONEY TO BUY MORE: NEVER TRUE
HOW OFTEN DO YOU GET TOGETHER WITH FRIENDS OR RELATIVES?: TWICE A WEEK
DO YOU BELONG TO ANY CLUBS OR ORGANIZATIONS SUCH AS CHURCH GROUPS UNIONS, FRATERNAL OR ATHLETIC GROUPS, OR SCHOOL GROUPS?: YES
DO YOU BELONG TO ANY CLUBS OR ORGANIZATIONS SUCH AS CHURCH GROUPS UNIONS, FRATERNAL OR ATHLETIC GROUPS, OR SCHOOL GROUPS?: YES
HOW OFTEN DO YOU ATTENT MEETINGS OF THE CLUB OR ORGANIZATION YOU BELONG TO?: MORE THAN 4 TIMES PER YEAR
HOW HARD IS IT FOR YOU TO PAY FOR THE VERY BASICS LIKE FOOD, HOUSING, MEDICAL CARE, AND HEATING?: NOT HARD AT ALL
HOW OFTEN DO YOU ATTENT MEETINGS OF THE CLUB OR ORGANIZATION YOU BELONG TO?: MORE THAN 4 TIMES PER YEAR
IN A TYPICAL WEEK, HOW MANY TIMES DO YOU TALK ON THE PHONE WITH FAMILY, FRIENDS, OR NEIGHBORS?: MORE THAN THREE TIMES A WEEK
IN A TYPICAL WEEK, HOW MANY TIMES DO YOU TALK ON THE PHONE WITH FAMILY, FRIENDS, OR NEIGHBORS?: MORE THAN THREE TIMES A WEEK
HOW OFTEN DO YOU GET TOGETHER WITH FRIENDS OR RELATIVES?: TWICE A WEEK
HOW OFTEN DO YOU ATTEND CHURCH OR RELIGIOUS SERVICES?: NEVER
HOW OFTEN DO YOU ATTEND CHURCH OR RELIGIOUS SERVICES?: NEVER

## 2024-05-29 ENCOUNTER — OFFICE VISIT (OUTPATIENT)
Dept: MEDICAL GROUP | Age: 79
End: 2024-05-29
Payer: MEDICARE

## 2024-05-29 VITALS
DIASTOLIC BLOOD PRESSURE: 68 MMHG | WEIGHT: 165 LBS | HEIGHT: 63 IN | BODY MASS INDEX: 29.23 KG/M2 | SYSTOLIC BLOOD PRESSURE: 120 MMHG | HEART RATE: 82 BPM | TEMPERATURE: 97.8 F | OXYGEN SATURATION: 97 %

## 2024-05-29 DIAGNOSIS — M81.0 OSTEOPOROSIS, POST-MENOPAUSAL: ICD-10-CM

## 2024-05-29 DIAGNOSIS — J30.1 NON-SEASONAL ALLERGIC RHINITIS DUE TO POLLEN: ICD-10-CM

## 2024-05-29 DIAGNOSIS — M51.36 DDD (DEGENERATIVE DISC DISEASE), LUMBAR: ICD-10-CM

## 2024-05-29 DIAGNOSIS — E78.2 MIXED HYPERLIPIDEMIA: ICD-10-CM

## 2024-05-29 DIAGNOSIS — E11.65 UNCONTROLLED TYPE 2 DIABETES MELLITUS WITH HYPERGLYCEMIA (HCC): ICD-10-CM

## 2024-05-29 DIAGNOSIS — I10 ESSENTIAL HYPERTENSION: ICD-10-CM

## 2024-05-29 DIAGNOSIS — M15.9 PRIMARY OSTEOARTHRITIS INVOLVING MULTIPLE JOINTS: ICD-10-CM

## 2024-05-29 DIAGNOSIS — Z00.00 MEDICARE ANNUAL WELLNESS VISIT, SUBSEQUENT: ICD-10-CM

## 2024-05-29 DIAGNOSIS — Z85.3 HISTORY OF BREAST CANCER: ICD-10-CM

## 2024-05-29 DIAGNOSIS — E55.9 VITAMIN D DEFICIENCY: ICD-10-CM

## 2024-05-29 DIAGNOSIS — D32.9 BENIGN MENINGIOMA (HCC): ICD-10-CM

## 2024-05-29 PROBLEM — E66.811 CLASS 1 OBESITY DUE TO EXCESS CALORIES WITH SERIOUS COMORBIDITY AND BODY MASS INDEX (BMI) OF 31.0 TO 31.9 IN ADULT: Status: RESOLVED | Noted: 2023-01-12 | Resolved: 2024-05-29

## 2024-05-29 PROBLEM — E66.09 CLASS 1 OBESITY DUE TO EXCESS CALORIES WITH SERIOUS COMORBIDITY AND BODY MASS INDEX (BMI) OF 31.0 TO 31.9 IN ADULT: Status: RESOLVED | Noted: 2023-01-12 | Resolved: 2024-05-29

## 2024-05-29 PROBLEM — E66.09 OBESITY DUE TO EXCESS CALORIES WITH SERIOUS COMORBIDITY: Status: RESOLVED | Noted: 2024-01-17 | Resolved: 2024-05-29

## 2024-05-29 LAB
HBA1C MFR BLD: 6 % (ref ?–5.8)
POCT INT CON NEG: NEGATIVE
POCT INT CON POS: POSITIVE

## 2024-05-29 ASSESSMENT — ENCOUNTER SYMPTOMS: GENERAL WELL-BEING: GOOD

## 2024-05-29 ASSESSMENT — FIBROSIS 4 INDEX: FIB4 SCORE: 1.42

## 2024-05-29 ASSESSMENT — ACTIVITIES OF DAILY LIVING (ADL): BATHING_REQUIRES_ASSISTANCE: 0

## 2024-05-29 ASSESSMENT — PATIENT HEALTH QUESTIONNAIRE - PHQ9: CLINICAL INTERPRETATION OF PHQ2 SCORE: 0

## 2024-05-29 NOTE — PROGRESS NOTES
MA COMPONENT     HPI:  Jovita Llanos is a 78 y.o. here today for a Medicare Annual Wellness Visit.     Patient Active Problem List    Diagnosis Date Noted    Obesity due to excess calories with serious comorbidity 01/17/2024    Non-seasonal allergic rhinitis due to pollen 10/17/2023    Class 1 obesity due to excess calories with serious comorbidity and body mass index (BMI) of 31.0 to 31.9 in adult 01/12/2023    Benign meningioma (HCC) 05/23/2018    DDD (degenerative disc disease), lumbar 06/29/2016    Uncontrolled type 2 diabetes mellitus with hyperglycemia (HCC) 10/27/2015    Osteoporosis, post-menopausal- since about 2002; on fosamax since early 2000s 09/17/2014    Mixed hyperlipidemia 08/27/2014    Essential hypertension 08/27/2014    Primary osteoarthritis involving multiple joints 08/27/2014    History of breast cancer- 2001; RT and lumpectomy; right side; neg nodes; tamoxifen x 5 yrs 08/27/2014    Vitamin D deficiency 08/27/2014        Current supplements as per medication list.     Allergies: Other environmental     Current social contact/activities: yes     She  reports that she has never smoked. She has never used smokeless tobacco. She reports current alcohol use of about 1.0 oz of alcohol per week. She reports that she does not use drugs.     Lives with at home: alone  Any caregivers: none   Is the caregiver paid or unpaid: none     ROS:   Gait: Uses no assistive device  Ostomy: No  Other tubes: No  Amputations: No  Chronic oxygen use: No  Last eye exam: 09/2023  Wears hearing aids: No  : Denies any urinary leakage during the last 6 months     Depression Screening  Little interest or pleasure in doing things?  0 - not at all  Feeling down, depressed , or hopeless? 0 - not at all  Patient Health Questionnaire Score: 0     If depressive symptoms identified deferred to follow up visit unless specifically addressed in assessment and plan.    Interpretation of PHQ-9 Total Score   Score Severity   1-4  No Depression   5-9 Mild Depression   10-14 Moderate Depression   15-19 Moderately Severe Depression   20-27 Severe Depression    Screening for Cognitive Impairment  Do you or any of your friends or family members have any concern about your memory? No  Three Minute Recall (Leader, Season, Table)  /3    Tristan clock face with all 12 numbers and set the hands to show 10 minutes after 11.  Yes    Cognitive concerns identified deferred for follow up unless specifically addressed in assessment and plan.    Fall Risk Assessment  Has the patient had two or more falls in the last year or any fall with injury in the last year?  No    Safety Assessment  Do you always wear your seatbelt?  Yes  Any changes to home needed to function safely? No  Difficulty hearing.  No  Patient counseled about all safety risks that were identified.    Functional Assessment ADLs  Are there any barriers preventing you from cooking for yourself or meeting nutritional needs?  No.    Are there any barriers preventing you from driving safely or obtaining transportation?  No.    Are there any barriers preventing you from using a telephone or calling for help?  No    Are there any barriers preventing you from shopping?  No.    Are there any barriers preventing you from taking care of your own finances?  No    Are there any barriers preventing you from managing your medications?  No    Are there any barriers preventing you from showering, bathing or dressing yourself? No    Are there any barriers preventing you from doing housework or laundry? No  Are there any barriers preventing you from using the toilet?No  Are you currently engaging in any exercise or physical activity?  Yes.      Self-Assessment of Health  What is your perception of your health? Good  Do you sleep more than six hours a night? Yes  In the past 7 days, how much did pain keep you from doing your normal work? None  Do you spend quality time with family or friends (virtually or in  person)? Yes  Do you usually eat a heart healthy diet that constists of a variety of fruits, vegetables, whole grains and fiber? Yes  Do you eat foods high in fat and/or Fast Food more than three times per week? No    Advance Care Planning  Do you have an Advance Directive, Living Will, Durable Power of , or POLST? Yes  Advance Directive Living Will Durable Power of  POLST        Health Maintenance Summary            Overdue - COVID-19 Vaccine (6 - 2023-24 season) Overdue since 9/1/2023      11/10/2022  Imm Admin: PFIZER BIVALENT SARS-COV-2 VACCINE (12+)    04/18/2022  Imm Admin: PFIZER GOLDSMITH CAP SARS-COV-2 VACCINATION (12+)    11/11/2021  Imm Admin: PFIZER PURPLE CAP SARS-COV-2 VACCINATION (12+)    02/11/2021  Imm Admin: PFIZER PURPLE CAP SARS-COV-2 VACCINATION (12+)    01/21/2021  Imm Admin: PFIZER PURPLE CAP SARS-COV-2 VACCINATION (12+)    Only the first 5 history entries have been loaded, but more history exists.              Overdue - Zoster (Shingles) Vaccines (3 of 3) Overdue since 12/18/2023      10/23/2023  Outside Immunization: Zoster Lamont (Shingrix)    05/09/2013  Imm Admin: Zoster Vaccine Live (ZVL) (Zostavax) - HISTORICAL DATA              Overdue - Annual Wellness Visit (Yearly) Overdue since 4/11/2024 04/11/2023  Visit Dx: Medicare annual wellness visit, subsequent    04/11/2023  Level of Service: OR ANNUAL WELLNESS VISIT-INCLUDES PPPS SUBSEQUE*    08/21/2015  Visit Dx: Encounter for Medicare annual wellness exam              A1c Screening (Every 6 Months) Next due on 7/12/2024 01/12/2024  HEMOGLOBIN A1C    10/06/2023  HEMOGLOBIN A1C    04/03/2023  HEMOGLOBIN A1C    10/17/2022  HEMOGLOBIN A1C    03/01/2022  HEMOGLOBIN A1C    Only the first 5 history entries have been loaded, but more history exists.              Diabetes: Retinopathy Screening (Yearly) Next due on 8/14/2024 08/14/2023  AMB EXTERNAL RETINAL SCREENING RESULTS    08/11/2022  Referral for Retinal Screening  Exam    08/11/2022  REFERRAL FOR RETINAL SCREENING EXAM    03/25/2020  REFERRAL FOR RETINAL SCREENING EXAM    09/20/2018  REFERRAL FOR RETINAL SCREENING EXAM    Only the first 5 history entries have been loaded, but more history exists.              Influenza Vaccine (Season Ended) Next due on 9/1/2024 09/22/2022  Imm Admin: Influenza, Unspecified - HISTORICAL DATA    10/18/2021  Imm Admin: Influenza, Unspecified - HISTORICAL DATA    11/11/2020  Imm Admin: Influenza Vaccine Quad Inj (Pf)    10/21/2019  Imm Admin: Influenza Vaccine Adult HD    09/28/2018  Imm Admin: Influenza Vaccine Adult HD    Only the first 5 history entries have been loaded, but more history exists.              Bone Density Scan (Every 5 Years) Tentatively due on 9/27/2024 09/27/2019  DS-BONE DENSITY STUDY (DEXA)    11/10/2016  DS-BONE DENSITY STUDY (DEXA)    09/02/2014  DS-BONE DENSITY STUDY (DEXA)    08/27/2004  Previously completed              Fasting Lipid Profile (Yearly) Next due on 1/12/2025 01/12/2024  Lipid Profile    10/06/2023  Lipid Profile    04/03/2023  Lipid Profile    10/17/2022  Lipid Profile    03/01/2022  Lipid Profile    Only the first 5 history entries have been loaded, but more history exists.              Diabetes: Urine Protein Screening (Yearly) Next due on 1/12/2025 01/12/2024  MICROALBUMIN CREAT RATIO URINE    10/06/2023  MICROALBUMIN CREAT RATIO URINE    04/03/2023  MICROALBUMIN CREAT RATIO URINE    10/17/2022  MICROALBUMIN CREAT RATIO URINE    08/25/2021  MICROALBUMIN CREAT RATIO URINE    Only the first 5 history entries have been loaded, but more history exists.              SERUM CREATININE (Yearly) Next due on 1/12/2025 01/12/2024  Comp Metabolic Panel    10/06/2023  Comp Metabolic Panel    04/03/2023  Comp Metabolic Panel    10/17/2022  Comp Metabolic Panel    03/01/2022  Comp Metabolic Panel    Only the first 5 history entries have been loaded, but more history exists.               Diabetes: Monofilament / LE Exam (Yearly) Next due on 1/17/2025 01/17/2024  Diabetic Monofilament LE Exam    01/12/2023  Diabetic Monofilament Lower Extremity Exam    03/04/2021  Diabetic Monofilament Lower Extremity Exam    07/29/2020  Done    05/23/2018  Diabetic Monofilament Lower Extremity Exam    Only the first 5 history entries have been loaded, but more history exists.              IMM DTaP/Tdap/Td Vaccine (2 - Td or Tdap) Next due on 9/16/2028 09/16/2018  Imm Admin: TD Vaccine    09/16/2018  Imm Admin: Tdap Vaccine              Pneumococcal Vaccine: 65+ Years (Series Information) Completed      03/30/2015  Imm Admin: Pneumococcal Conjugate Vaccine (Prevnar/PCV-13)    04/27/2013  Imm Admin: Pneumococcal polysaccharide vaccine (PPSV-23)              Hepatitis C Screening  Tentatively Complete      01/26/2021  Hepatitis C Antibody component of HEP C VIRUS ANTIBODY              Hepatitis A Vaccine (Hep A) (Series Information) Aged Out      No completion history exists for this topic.              Hepatitis B Vaccine (Hep B) (Series Information) Ordered on 10/17/2023      No completion history exists for this topic.              HPV Vaccines (Series Information) Aged Out      No completion history exists for this topic.              Polio Vaccine (Inactivated Polio) (Series Information) Aged Out      No completion history exists for this topic.              Meningococcal Immunization (Series Information) Aged Out      No completion history exists for this topic.              Discontinued - Mammogram  Discontinued        Frequency changed to Never automatically (Topic No Longer Applies)    02/15/2024  MA-SCREENING MAMMO BILAT W/TOMOSYNTHESIS W/CAD    02/08/2023  MA-SCREENING MAMMO BILAT W/TOMOSYNTHESIS W/CAD    02/07/2022  MA-SCREENING MAMMO BILAT W/TOMOSYNTHESIS W/CAD    12/18/2020  MA-SCREENING MAMMO BILAT W/TOMOSYNTHESIS W/CAD    Only the first 5 history entries have been loaded, but more history  exists.              Discontinued - Cervical Cancer Screening  Discontinued        Frequency changed to Never automatically (Topic No Longer Applies)    03/09/2016  Done    03/09/2016  PATHOLOGY GYN SPECIMEN    03/09/2016  THINPREP PAP,REFLEX HPV ON ASC-US ONLY              Discontinued - Colorectal Cancer Screening  Discontinued        Frequency changed to Never automatically (Topic No Longer Applies)    01/16/2013  AMB REFERRAL TO GI FOR COLONOSCOPY                    Patient Care Team:  Saurabh Ventura M.D. as PCP - General (Internal Medicine)  Zackery Martínez D.O. (Inactive) (Gastroenterology)  Marquez Hernandez M.D. (Ophthalmology)         Patient Care Team:  Saurabh Ventura M.D. as PCP - General (Internal Medicine)  Zackery Martínez D.O. (Inactive) (Gastroenterology)  Marquez Hernandez M.D. (Ophthalmology)     Social History     Tobacco Use    Smoking status: Never    Smokeless tobacco: Never   Vaping Use    Vaping status: Never Used   Substance Use Topics    Alcohol use: Yes     Alcohol/week: 1.0 oz     Types: 1 Glasses of wine, 1 Cans of beer per week     Comment: occasional    Drug use: No        Family History   Problem Relation Age of Onset    Stroke Mother     Hypertension Mother     Heart Attack Father     Diabetes Father     Hypertension Father     Hyperlipidemia Brother     Stroke Brother     Heart Disease Brother         Past Surgical History:   Procedure Laterality Date    BREAST BIOPSY  2001    LUMPECTOMY  2001    right breast CA     CHOLECYSTECTOMY      MASTECTOMY Right     partial         Whisper test - stand 3 feet  behind patient and whisper 3 numbers for each ear while covering other ear..  - Right 3/3, Left 3/3     Vision test - using Snellen eye chart test both eyes separately and together, with and without correction.   - Without correction:      OD  /40, OS /40, OU /40  - With correction:             OD  /40, OS /40, OU /40     Rise and walk test - Have patient stand, walk 10 feet  "and return to chair.  -  12 seconds     Stay independent checklist:  Yes (2) No (0) I have fallen in the past year. 0  Yes (2) No (0) I use or have been advised to use a cane or  walker to get around safely.0  Yes (1) No (0) Sometimes I feel unsteady when I am walking. 0  Yes (1) No (0) I steady myself by holding onto furniture  when walking at home.0  Yes (1) No (0) I am worried about falling0  Yes (1) No (0) I need to push with my hands to stand up  from a chair.0  Yes (1) No (0) I have some trouble stepping up onto a curb. 0  Yes (1) No (0) I often have to rush to the toilet. 0  Yes (1) No (0) I have lost some feeling in my feet. 0  Yes (1) No (0) I take medicine that sometimes makes me feel  light-headed or more tired than usual.0  Yes (1) No (0) I take medicine to help me sleep or improve  my mood. 0  Yes (1) No (0) I often feel sad or depressed. 0  Total Add up the number of points for each “yes” answer. 0     EXAM  /68 (BP Location: Left arm, Patient Position: Sitting, BP Cuff Size: Adult)   Pulse 82   Temp 36.6 °C (97.8 °F) (Temporal)   Ht 1.6 m (5' 3\")   Wt 74.8 kg (165 lb)   SpO2 97%  -            Hearing good.   Dentition good  Alert, oriented in no acute distress.  Eye contact is good, speech goal directed, affect calm                Health maintenance review: (catch them up on all care gaps - vaccines, mammogram, colon cancer screen)  Health Maintenance Summary            Overdue - COVID-19 Vaccine (6 - 2023-24 season) Overdue since 9/1/2023      11/10/2022  Imm Admin: PFIZER BIVALENT SARS-COV-2 VACCINE (12+)    04/18/2022  Imm Admin: PFIZER GOLDSMITH CAP SARS-COV-2 VACCINATION (12+)    11/11/2021  Imm Admin: PFIZER PURPLE CAP SARS-COV-2 VACCINATION (12+)    02/11/2021  Imm Admin: PFIZER PURPLE CAP SARS-COV-2 VACCINATION (12+)    01/21/2021  Imm Admin: PFIZER PURPLE CAP SARS-COV-2 VACCINATION (12+)    Only the first 5 history entries have been loaded, but more history exists.              Overdue " - Zoster (Shingles) Vaccines (3 of 3) Overdue since 12/18/2023      10/23/2023  Outside Immunization: Zoster Lamont (Shingrix)    05/09/2013  Imm Admin: Zoster Vaccine Live (ZVL) (Zostavax) - HISTORICAL DATA              Overdue - Annual Wellness Visit (Yearly) Overdue since 4/11/2024 04/11/2023  Visit Dx: Medicare annual wellness visit, subsequent    04/11/2023  Level of Service: [place] ANNUAL WELLNESS VISIT-INCLUDES PPPS SUBSEQUE*    08/21/2015  Visit Dx: Encounter for Medicare annual wellness exam              A1c Screening (Every 6 Months) Next due on 7/12/2024 01/12/2024  HEMOGLOBIN A1C    10/06/2023  HEMOGLOBIN A1C    04/03/2023  HEMOGLOBIN A1C    10/17/2022  HEMOGLOBIN A1C    03/01/2022  HEMOGLOBIN A1C    Only the first 5 history entries have been loaded, but more history exists.              Diabetes: Retinopathy Screening (Yearly) Next due on 8/14/2024 08/14/2023  AMB EXTERNAL RETINAL SCREENING RESULTS    08/11/2022  Referral for Retinal Screening Exam    08/11/2022  REFERRAL FOR RETINAL SCREENING EXAM    03/25/2020  REFERRAL FOR RETINAL SCREENING EXAM    09/20/2018  REFERRAL FOR RETINAL SCREENING EXAM    Only the first 5 history entries have been loaded, but more history exists.              Influenza Vaccine (Season Ended) Next due on 9/1/2024 09/22/2022  Imm Admin: Influenza, Unspecified - HISTORICAL DATA    10/18/2021  Imm Admin: Influenza, Unspecified - HISTORICAL DATA    11/11/2020  Imm Admin: Influenza Vaccine Quad Inj (Pf)    10/21/2019  Imm Admin: Influenza Vaccine Adult HD    09/28/2018  Imm Admin: Influenza Vaccine Adult HD    Only the first 5 history entries have been loaded, but more history exists.              Bone Density Scan (Every 5 Years) Tentatively due on 9/27/2024 09/27/2019  DS-BONE DENSITY STUDY (DEXA)    11/10/2016  DS-BONE DENSITY STUDY (DEXA)    09/02/2014  DS-BONE DENSITY STUDY (DEXA)    08/27/2004  Previously completed              Fasting Lipid Profile  (Yearly) Next due on 1/12/2025 01/12/2024  Lipid Profile    10/06/2023  Lipid Profile    04/03/2023  Lipid Profile    10/17/2022  Lipid Profile    03/01/2022  Lipid Profile    Only the first 5 history entries have been loaded, but more history exists.              Diabetes: Urine Protein Screening (Yearly) Next due on 1/12/2025 01/12/2024  MICROALBUMIN CREAT RATIO URINE    10/06/2023  MICROALBUMIN CREAT RATIO URINE    04/03/2023  MICROALBUMIN CREAT RATIO URINE    10/17/2022  MICROALBUMIN CREAT RATIO URINE    08/25/2021  MICROALBUMIN CREAT RATIO URINE    Only the first 5 history entries have been loaded, but more history exists.              SERUM CREATININE (Yearly) Next due on 1/12/2025 01/12/2024  Comp Metabolic Panel    10/06/2023  Comp Metabolic Panel    04/03/2023  Comp Metabolic Panel    10/17/2022  Comp Metabolic Panel    03/01/2022  Comp Metabolic Panel    Only the first 5 history entries have been loaded, but more history exists.              Diabetes: Monofilament / LE Exam (Yearly) Next due on 1/17/2025 01/17/2024  Diabetic Monofilament LE Exam    01/12/2023  Diabetic Monofilament Lower Extremity Exam    03/04/2021  Diabetic Monofilament Lower Extremity Exam    07/29/2020  Done    05/23/2018  Diabetic Monofilament Lower Extremity Exam    Only the first 5 history entries have been loaded, but more history exists.              IMM DTaP/Tdap/Td Vaccine (2 - Td or Tdap) Next due on 9/16/2028 09/16/2018  Imm Admin: TD Vaccine    09/16/2018  Imm Admin: Tdap Vaccine              Pneumococcal Vaccine: 65+ Years (Series Information) Completed      03/30/2015  Imm Admin: Pneumococcal Conjugate Vaccine (Prevnar/PCV-13)    04/27/2013  Imm Admin: Pneumococcal polysaccharide vaccine (PPSV-23)              Hepatitis C Screening  Tentatively Complete      01/26/2021  Hepatitis C Antibody component of HEP C VIRUS ANTIBODY              Hepatitis A Vaccine (Hep A) (Series Information) Aged Out       No completion history exists for this topic.              Hepatitis B Vaccine (Hep B) (Series Information) Ordered on 10/17/2023      No completion history exists for this topic.              HPV Vaccines (Series Information) Aged Out      No completion history exists for this topic.              Polio Vaccine (Inactivated Polio) (Series Information) Aged Out      No completion history exists for this topic.              Meningococcal Immunization (Series Information) Aged Out      No completion history exists for this topic.              Discontinued - Mammogram  Discontinued        Frequency changed to Never automatically (Topic No Longer Applies)    02/15/2024  MA-SCREENING MAMMO BILAT W/TOMOSYNTHESIS W/CAD    02/08/2023  MA-SCREENING MAMMO BILAT W/TOMOSYNTHESIS W/CAD    02/07/2022  MA-SCREENING MAMMO BILAT W/TOMOSYNTHESIS W/CAD    12/18/2020  MA-SCREENING MAMMO BILAT W/TOMOSYNTHESIS W/CAD    Only the first 5 history entries have been loaded, but more history exists.              Discontinued - Cervical Cancer Screening  Discontinued        Frequency changed to Never automatically (Topic No Longer Applies)    03/09/2016  Done    03/09/2016  PATHOLOGY GYN SPECIMEN    03/09/2016  THINPREP PAP,REFLEX HPV ON ASC-US ONLY              Discontinued - Colorectal Cancer Screening  Discontinued        Frequency changed to Never automatically (Topic No Longer Applies)    01/16/2013  AMB REFERRAL TO GI FOR COLONOSCOPY                     Medication review:  Current Outpatient Medications   Medication Sig Dispense Refill    atorvastatin (LIPITOR) 40 MG Tab Take 1 Tablet by mouth every evening. 90 Tablet 4    Cholecalciferol (VITAMIN D) 50 MCG (2000 UT) Cap Take 1 Capsule by mouth every day. 100 Capsule 4    losartan (COZAAR) 100 MG Tab Take 1 Tablet by mouth every day. 90 Tablet 4    metFORMIN (GLUCOPHAGE) 500 MG Tab Take 1 Tablet by mouth 2 times a day with meals. 180 Tablet 4    Semaglutide, 1 MG/DOSE, 4 MG/3ML Solution  Pen-injector Inject 1 mg under the skin every 7 days. 12 mL 3     No current facility-administered medications for this visit.        If opioid/benzo/sedative on med list discussion of reduction/elimination - referral     At this point document if pt refuses to consider changes off of controlled substance      Vital sign review - unintentional >10 pound weight loss evaluation (BMI and Muscle Mass review)      Whisper test fail - audiology referral      Eye test fail - ophthalmology referral      Stay independent checklist - PT referral for a score of 4 or more      PHQ evaluation - consider visit for medication change/referral      Mini-cog evaluation - consider visit for further evaluation/referral      POLST on file- if not need to have on hand for patient to fill out - consider Palliative referral etc.     LEANN screen -     Audit-C Questionnaire     1. How often did you have a drink containing alcohol in the past year?   Never (0 points)  * If you answered Never, score questions 2 and 3 below as zero.    Monthly or less (1 point)   2 to 4 times a month (2 points)   2 or 3 times per week (3 points)   4 or more times a week (4 points)     2     2. How many drinks did you have on a typical day when you were drinking in the past year?   1 - 2 (0 points)   3 - 4 (1point)   5 - 6 (2 points)   7 - 9 (3 points)   10 or more (4 points)     0     3. How often did you have 6 or more drinks on one occasion in the past year?   Never (0 points)   Less than monthly (1 point)   Monthly (2 points)   Weekly (3 points)   Daily or almost daily (4 points)     0     Total: 2     Scorin or more for men and 3 or more for women is an indication for further evaluation for hazardous drinking.     Chronic pain screen:        PEG: A Three-Item Scale Assessing Pain Intensity and Interference  1. What number best describes your pain on average in the past week?   0       1         2              3                     4                        "   5              6               7             8           9            10  No pain                                                                                                            Pain as bad as you can imagine     0     2. What number best describes how, during the past week, pain has interfered  with your enjoyment of life?  0       1         2              3                     4                          5              6               7             8           9            10  No pain                                                                                                            Pain as bad as you can imagine     o  3. What number best describes how, during the past week, pain has interfered  with your general activity?   0       1         2              3                     4                          5              6               7             8           9            10  No pain                                                                                                            Pain as bad as you can imagine     o     Scoring: average of 3 scales: o  Serves as a baseline measurement for chronic pain issues and quality of life and can be used to prompt pain clinic referral.           Tobacco use - consider visit for discussion if user - discuss alternatives and quitting     Food insecurity screen - (The Hunger Vital Sign)  Within the past 12 months were you worried whether food would run out before you got money to buy more?  Often true          sometimes true          never true  Never      Within the past 12 months did the food you bought not last and you did not have money to buy more food?  Often true         sometimes  true          never true  never     Scoring: answering \"often true\" or \"sometimes true\" to either question is a positive screen for food insecurity and should prompt a social work/ referral if needed     Review of Rise and walk test - PT referral for " a score over 13 seconds     Clasp arms behind back and head test - have the patient try and clasp hands behind back and then over head, failure to accomplish may indicate shoulder issues- PT/ortho/pmr referral         Assessment and Plan. The following treatment and monitoring plan is recommended:          Services suggested: No services needed at this time  Health Care Screening: Age-appropriate preventive services recommended by USPTF and ACIP covered by Medicare were discussed today. Services ordered if indicated and agreed upon by the patient.  Referrals offered: Community-based lifestyle interventions to reduce health risks and promote self-management and wellness, fall prevention, nutrition, physical activity, tobacco-use cessation, weight loss, and mental health services as per orders if indicated.     Discussion today about general wellness and lifestyle habits:   ?              Prevent falls and reduce trip hazards; Cautioned about securing or removing rugs.  ?              Have a working fire alarm and carbon monoxide detector;  ?              Engage in regular physical activity and social activities     1. Medicare annual wellness visit, subsequent  All metrics reviewed and updated.  Exam unremarkable.  Patient has lost 10 pounds since last visit and is now has a BMI of 29 so we will get rid of the diagnosis of obesity but continue on her Ozempic for her diabetes and being significantly overweight with comorbid risk factors.    2. Uncontrolled type 2 diabetes mellitus with hyperglycemia (HCC)  Now under good control.  Continue  Ozempic.  Continue metformin 500 mg twice daily.  - Comp Metabolic Panel; Future  - CBC WITH DIFFERENTIAL; Future  - TSH; Future  - Lipid Profile; Future  - HEMOGLOBIN A1C; Future  - MICROALBUMIN CREAT RATIO URINE; Future  - POCT Hemoglobin A1C    3. Vitamin D deficiency  Under good control.  Continue vitamin D 2000 units daily.  - VITAMIN D,25 HYDROXY (DEFICIENCY); Future    4.  Primary osteoarthritis involving multiple joints  Good control continue Tylenol 500 mg 2 pills 3 times daily as needed    5. Osteoporosis, post-menopausal- since about 2002; on fosamax since early 2000s  Under good control continue current regimen    6. Benign meningioma (HCC)  This is benign with no headaches or progression.  Continue surveillance periodically based on symptoms    7. Essential hypertension  Good control continue current regimen.    8. History of breast cancer- 2001; RT and lumpectomy; right side; neg nodes; tamoxifen x 5 yrs  Under good control continue self breast exam and mammogram annually    9. Mixed hyperlipidemia  Good control continue current regimen with Lipitor 40 mg daily  - Comp Metabolic Panel; Future  - CBC WITH DIFFERENTIAL; Future  - TSH; Future  - Lipid Profile; Future    10. Non-seasonal allergic rhinitis due to pollen     Good control continue current regimen with Flonase daily  11. DDD (degenerative disc disease), lumbar     Stable.  Continue exercise stretching and weight reduction.     Follow-up: No follow-ups on file.

## 2024-10-30 ENCOUNTER — HOSPITAL ENCOUNTER (OUTPATIENT)
Dept: LAB | Facility: MEDICAL CENTER | Age: 79
End: 2024-10-30
Attending: INTERNAL MEDICINE
Payer: MEDICARE

## 2024-10-30 DIAGNOSIS — E78.2 MIXED HYPERLIPIDEMIA: ICD-10-CM

## 2024-10-30 DIAGNOSIS — E55.9 VITAMIN D DEFICIENCY: ICD-10-CM

## 2024-10-30 DIAGNOSIS — E11.65 UNCONTROLLED TYPE 2 DIABETES MELLITUS WITH HYPERGLYCEMIA (HCC): ICD-10-CM

## 2024-10-30 LAB
BASOPHILS # BLD AUTO: 1.1 % (ref 0–1.8)
BASOPHILS # BLD: 0.05 K/UL (ref 0–0.12)
EOSINOPHIL # BLD AUTO: 0.37 K/UL (ref 0–0.51)
EOSINOPHIL NFR BLD: 8.1 % (ref 0–6.9)
ERYTHROCYTE [DISTWIDTH] IN BLOOD BY AUTOMATED COUNT: 41.9 FL (ref 35.9–50)
EST. AVERAGE GLUCOSE BLD GHB EST-MCNC: 131 MG/DL
HBA1C MFR BLD: 6.2 % (ref 4–5.6)
HCT VFR BLD AUTO: 45 % (ref 37–47)
HGB BLD-MCNC: 14.7 G/DL (ref 12–16)
IMM GRANULOCYTES # BLD AUTO: 0.02 K/UL (ref 0–0.11)
IMM GRANULOCYTES NFR BLD AUTO: 0.4 % (ref 0–0.9)
LYMPHOCYTES # BLD AUTO: 1.13 K/UL (ref 1–4.8)
LYMPHOCYTES NFR BLD: 24.7 % (ref 22–41)
MCH RBC QN AUTO: 28.9 PG (ref 27–33)
MCHC RBC AUTO-ENTMCNC: 32.7 G/DL (ref 32.2–35.5)
MCV RBC AUTO: 88.6 FL (ref 81.4–97.8)
MONOCYTES # BLD AUTO: 0.39 K/UL (ref 0–0.85)
MONOCYTES NFR BLD AUTO: 8.5 % (ref 0–13.4)
NEUTROPHILS # BLD AUTO: 2.62 K/UL (ref 1.82–7.42)
NEUTROPHILS NFR BLD: 57.2 % (ref 44–72)
NRBC # BLD AUTO: 0 K/UL
NRBC BLD-RTO: 0 /100 WBC (ref 0–0.2)
PLATELET # BLD AUTO: 181 K/UL (ref 164–446)
PMV BLD AUTO: 12 FL (ref 9–12.9)
RBC # BLD AUTO: 5.08 M/UL (ref 4.2–5.4)
WBC # BLD AUTO: 4.6 K/UL (ref 4.8–10.8)

## 2024-10-30 PROCEDURE — 85025 COMPLETE CBC W/AUTO DIFF WBC: CPT

## 2024-10-30 PROCEDURE — 82043 UR ALBUMIN QUANTITATIVE: CPT

## 2024-10-30 PROCEDURE — 83036 HEMOGLOBIN GLYCOSYLATED A1C: CPT | Mod: GA

## 2024-10-30 PROCEDURE — 84443 ASSAY THYROID STIM HORMONE: CPT

## 2024-10-30 PROCEDURE — 36415 COLL VENOUS BLD VENIPUNCTURE: CPT

## 2024-10-30 PROCEDURE — 80053 COMPREHEN METABOLIC PANEL: CPT

## 2024-10-30 PROCEDURE — 82570 ASSAY OF URINE CREATININE: CPT

## 2024-10-30 PROCEDURE — 80061 LIPID PANEL: CPT

## 2024-10-30 PROCEDURE — 82306 VITAMIN D 25 HYDROXY: CPT

## 2024-10-31 LAB
25(OH)D3 SERPL-MCNC: 49 NG/ML (ref 30–100)
ALBUMIN SERPL BCP-MCNC: 4.2 G/DL (ref 3.2–4.9)
ALBUMIN/GLOB SERPL: 1.6 G/DL
ALP SERPL-CCNC: 53 U/L (ref 30–99)
ALT SERPL-CCNC: 28 U/L (ref 2–50)
ANION GAP SERPL CALC-SCNC: 10 MMOL/L (ref 7–16)
AST SERPL-CCNC: 24 U/L (ref 12–45)
BILIRUB SERPL-MCNC: 0.4 MG/DL (ref 0.1–1.5)
BUN SERPL-MCNC: 18 MG/DL (ref 8–22)
CALCIUM ALBUM COR SERPL-MCNC: 9 MG/DL (ref 8.5–10.5)
CALCIUM SERPL-MCNC: 9.2 MG/DL (ref 8.5–10.5)
CHLORIDE SERPL-SCNC: 105 MMOL/L (ref 96–112)
CHOLEST SERPL-MCNC: 134 MG/DL (ref 100–199)
CO2 SERPL-SCNC: 26 MMOL/L (ref 20–33)
CREAT SERPL-MCNC: 0.78 MG/DL (ref 0.5–1.4)
CREAT UR-MCNC: 145.22 MG/DL
FASTING STATUS PATIENT QL REPORTED: NORMAL
GFR SERPLBLD CREATININE-BSD FMLA CKD-EPI: 78 ML/MIN/1.73 M 2
GLOBULIN SER CALC-MCNC: 2.7 G/DL (ref 1.9–3.5)
GLUCOSE SERPL-MCNC: 126 MG/DL (ref 65–99)
HDLC SERPL-MCNC: 54 MG/DL
LDLC SERPL CALC-MCNC: 48 MG/DL
MICROALBUMIN UR-MCNC: <1.2 MG/DL
MICROALBUMIN/CREAT UR: NORMAL MG/G (ref 0–30)
POTASSIUM SERPL-SCNC: 4.2 MMOL/L (ref 3.6–5.5)
PROT SERPL-MCNC: 6.9 G/DL (ref 6–8.2)
SODIUM SERPL-SCNC: 141 MMOL/L (ref 135–145)
TRIGL SERPL-MCNC: 161 MG/DL (ref 0–149)
TSH SERPL-ACNC: 2.65 UIU/ML (ref 0.35–5.5)

## 2024-11-07 ENCOUNTER — APPOINTMENT (OUTPATIENT)
Dept: MEDICAL GROUP | Age: 79
End: 2024-11-07
Payer: MEDICARE

## 2024-11-07 VITALS
BODY MASS INDEX: 27.82 KG/M2 | OXYGEN SATURATION: 95 % | HEIGHT: 63 IN | SYSTOLIC BLOOD PRESSURE: 126 MMHG | HEART RATE: 84 BPM | DIASTOLIC BLOOD PRESSURE: 60 MMHG | WEIGHT: 157 LBS | TEMPERATURE: 97.5 F

## 2024-11-07 DIAGNOSIS — I10 ESSENTIAL HYPERTENSION: ICD-10-CM

## 2024-11-07 DIAGNOSIS — E11.9 CONTROLLED TYPE 2 DIABETES MELLITUS WITHOUT COMPLICATION, WITHOUT LONG-TERM CURRENT USE OF INSULIN (HCC): ICD-10-CM

## 2024-11-07 DIAGNOSIS — E78.2 MIXED HYPERLIPIDEMIA: ICD-10-CM

## 2024-11-07 DIAGNOSIS — M81.0 OSTEOPOROSIS, POST-MENOPAUSAL: ICD-10-CM

## 2024-11-07 DIAGNOSIS — Z78.0 POST-MENOPAUSAL: ICD-10-CM

## 2024-11-07 PROCEDURE — 3074F SYST BP LT 130 MM HG: CPT | Performed by: STUDENT IN AN ORGANIZED HEALTH CARE EDUCATION/TRAINING PROGRAM

## 2024-11-07 PROCEDURE — 99214 OFFICE O/P EST MOD 30 MIN: CPT | Performed by: STUDENT IN AN ORGANIZED HEALTH CARE EDUCATION/TRAINING PROGRAM

## 2024-11-07 PROCEDURE — 3078F DIAST BP <80 MM HG: CPT | Performed by: STUDENT IN AN ORGANIZED HEALTH CARE EDUCATION/TRAINING PROGRAM

## 2024-11-07 ASSESSMENT — ENCOUNTER SYMPTOMS
BLOOD IN STOOL: 0
PALPITATIONS: 0
ABDOMINAL PAIN: 0
BACK PAIN: 0
DOUBLE VISION: 0
DIZZINESS: 0
BLURRED VISION: 0
WEAKNESS: 0
DEPRESSION: 0
FEVER: 0
CHILLS: 0
COUGH: 0
SHORTNESS OF BREATH: 0
BRUISES/BLEEDS EASILY: 0
HEADACHES: 0

## 2024-11-07 ASSESSMENT — FIBROSIS 4 INDEX: FIB4 SCORE: 1.93

## 2024-11-07 NOTE — PROGRESS NOTES
Subjective:     CC: Review follow-up    HPI:   History of Present Illness  The patient is a 79-year-old female presenting for a routine check-up and lab review.    She typically undergoes these check-ups every six months and reports feeling well overall. She is compliant with her medication regimen. Her last visit to her primary care physician, Dr. Ventura, was in May 2024 for her annual exam, during which labs were ordered.    She mentions experiencing some allergies, which she attributes to working outdoors. She also notes that her triglyceride levels were slightly elevated during her last lab work. She has been taking vitamin D supplements due to a previous deficiency. She is not on any thyroid medication.    She contracted COVID-19 in September 2024, but it was a mild case, similar to a common cold, with a cough that lasted for a week. She has since received the COVID-19 vaccine. She has also received her influenza vaccine.    Lab work showed the following: CBC showed mild elevated eosinophils, complete metabolic panel showed elevated fasting blood glucose A1c was 6.2%, lipid profile showed mild elevated triglycerides, otherwise normal total cholesterol, normal HDL and normal LDL.  Vitamin D was 49, TSH was 2.6.    She was advised to undergo a DEXA scan during her last lab visit.       ROS:  Review of Systems   Constitutional:  Negative for chills, fever and malaise/fatigue.   HENT:  Negative for hearing loss and nosebleeds.    Eyes:  Negative for blurred vision and double vision.   Respiratory:  Negative for cough and shortness of breath.    Cardiovascular:  Negative for chest pain, palpitations and leg swelling.   Gastrointestinal:  Negative for abdominal pain, blood in stool and melena.   Genitourinary:  Negative for dysuria and urgency.   Musculoskeletal:  Negative for back pain and joint pain.   Skin:  Negative for rash.   Neurological:  Negative for dizziness, weakness and headaches.   Endo/Heme/Allergies:   "Does not bruise/bleed easily.   Psychiatric/Behavioral:  Negative for depression and suicidal ideas.        Objective:     Exam:  /60 (BP Location: Right arm, Patient Position: Sitting, BP Cuff Size: Adult)   Pulse 84   Temp 36.4 °C (97.5 °F) (Temporal)   Ht 1.6 m (5' 3\")   Wt 71.2 kg (157 lb)   SpO2 95%   BMI 27.81 kg/m²  Body mass index is 27.81 kg/m².    Physical Exam  Vitals reviewed.   Constitutional:       General: She is not in acute distress.  HENT:      Head: Normocephalic and atraumatic.      Mouth/Throat:      Mouth: Mucous membranes are moist.   Eyes:      Extraocular Movements: Extraocular movements intact.      Pupils: Pupils are equal, round, and reactive to light.   Cardiovascular:      Rate and Rhythm: Normal rate and regular rhythm.      Pulses: Normal pulses.      Heart sounds: Normal heart sounds. No murmur heard.  Pulmonary:      Effort: Pulmonary effort is normal. No respiratory distress.      Breath sounds: Normal breath sounds. No wheezing.   Abdominal:      General: There is no distension.      Palpations: Abdomen is soft.      Tenderness: There is no abdominal tenderness. There is no guarding or rebound.   Musculoskeletal:      Cervical back: Normal range of motion and neck supple.      Right lower leg: No edema.      Left lower leg: No edema.   Skin:     General: Skin is warm.      Capillary Refill: Capillary refill takes less than 2 seconds.      Coloration: Skin is not jaundiced.   Neurological:      General: No focal deficit present.      Mental Status: She is alert.      Cranial Nerves: No cranial nerve deficit.   Psychiatric:         Mood and Affect: Mood normal.         Behavior: Behavior normal.       Labs: Reviewed    Assessment & Plan:     79 y.o. female with the following -     1. Controlled type 2 diabetes mellitus without complication, without long-term current use of insulin (HCC)  Her hemoglobin A1c is within the prediabetic range at 6.2 percent. She is advised to " continue monitoring her blood sugar levels and maintain a healthy diet and exercise regimen. Blood work will be ordered to monitor A1c levels before the next appointment.  Continue taking metformin 500 mg twice daily and semaglutide 1 mg weekly.  A1c prior to next visit  - HEMOGLOBIN A1C; Future    2. Essential hypertension  -Chronic, stable  -Excellent blood pressure control, her blood pressure today at our office was 126/60  -Currently managed with losartan 100 mg daily  -Same therapy    3. Mixed hyperlipidemia  Triglycerides are slightly elevated compared to the previous reading. She is advised to continue her current diet and exercise routine. Blood work will be ordered to monitor cholesterol levels before the next appointment.  Continue taking Lipitor 40 mg daily.  Lab work prior to next visit.    - HEMOGLOBIN A1C; Future  - Comp Metabolic Panel; Future  - Lipid Profile; Future    4. Osteoporosis, post-menopausal    5. Post-menopausal  -Due for screening  - DS-BONE DENSITY STUDY (DEXA); Future    Return in about 6 months (around 5/7/2025) for Annual.    Please note that this dictation was created using voice recognition software. I have made every reasonable attempt to correct obvious errors, but I expect that there are errors of grammar and possibly content that I did not discover before finalizing the note.

## 2024-12-11 DIAGNOSIS — E78.2 MIXED HYPERLIPIDEMIA: ICD-10-CM

## 2024-12-11 RX ORDER — ATORVASTATIN CALCIUM 40 MG/1
TABLET, FILM COATED ORAL
Qty: 90 TABLET | Refills: 3 | Status: SHIPPED | OUTPATIENT
Start: 2024-12-11

## 2024-12-11 NOTE — TELEPHONE ENCOUNTER
Received request via: Pharmacy    Was the patient seen in the last year in this department? Yes    Does the patient have an active prescription (recently filled or refills available) for medication(s) requested? No    Pharmacy Name: [facility] - [facility] - [facility] AT [address] TO REGISTERED [facility] S[facility]     Does the patient have nursing home Plus and need 100-day supply? (This applies to ALL medications) Patient does not have SCP     Requested Prescriptions     Pending Prescriptions Disp Refills    atorvastatin (LIPITOR) 40 MG Tab [Pharmacy Med Name: ATORVASTATIN TAB 40MG] 90 Tablet 3     Sig: TAKE 1 TABLET EVERY        EVENING, REPLACES 20MG DOSE

## 2025-01-29 ENCOUNTER — HOSPITAL ENCOUNTER (OUTPATIENT)
Dept: RADIOLOGY | Facility: MEDICAL CENTER | Age: 80
End: 2025-01-29
Attending: STUDENT IN AN ORGANIZED HEALTH CARE EDUCATION/TRAINING PROGRAM
Payer: MEDICARE

## 2025-01-29 DIAGNOSIS — M81.0 OSTEOPOROSIS, POST-MENOPAUSAL: ICD-10-CM

## 2025-01-29 DIAGNOSIS — Z78.0 POST-MENOPAUSAL: ICD-10-CM

## 2025-01-29 PROCEDURE — 77080 DXA BONE DENSITY AXIAL: CPT

## 2025-02-26 ENCOUNTER — HOSPITAL ENCOUNTER (OUTPATIENT)
Dept: RADIOLOGY | Facility: MEDICAL CENTER | Age: 80
End: 2025-02-26
Attending: INTERNAL MEDICINE
Payer: MEDICARE

## 2025-02-26 DIAGNOSIS — Z12.31 VISIT FOR SCREENING MAMMOGRAM: ICD-10-CM

## 2025-02-26 PROCEDURE — 77067 SCR MAMMO BI INCL CAD: CPT

## 2025-03-09 DIAGNOSIS — E11.65 UNCONTROLLED TYPE 2 DIABETES MELLITUS WITH HYPERGLYCEMIA (HCC): ICD-10-CM

## 2025-03-09 DIAGNOSIS — I10 ESSENTIAL HYPERTENSION: ICD-10-CM

## 2025-03-10 RX ORDER — LOSARTAN POTASSIUM 100 MG/1
100 TABLET ORAL DAILY
Qty: 90 TABLET | Refills: 4 | Status: SHIPPED | OUTPATIENT
Start: 2025-03-10

## 2025-05-27 ENCOUNTER — APPOINTMENT (OUTPATIENT)
Facility: MEDICAL CENTER | Age: 80
End: 2025-05-27
Attending: STUDENT IN AN ORGANIZED HEALTH CARE EDUCATION/TRAINING PROGRAM
Payer: MEDICARE

## 2025-05-27 ENCOUNTER — HOSPITAL ENCOUNTER (OUTPATIENT)
Dept: LAB | Facility: MEDICAL CENTER | Age: 80
End: 2025-05-27
Attending: STUDENT IN AN ORGANIZED HEALTH CARE EDUCATION/TRAINING PROGRAM
Payer: MEDICARE

## 2025-05-27 DIAGNOSIS — E78.2 MIXED HYPERLIPIDEMIA: ICD-10-CM

## 2025-05-27 DIAGNOSIS — E11.9 CONTROLLED TYPE 2 DIABETES MELLITUS WITHOUT COMPLICATION, WITHOUT LONG-TERM CURRENT USE OF INSULIN (HCC): ICD-10-CM

## 2025-05-27 LAB
ALBUMIN SERPL BCP-MCNC: 4.4 G/DL (ref 3.2–4.9)
ALBUMIN/GLOB SERPL: 1.5 G/DL
ALP SERPL-CCNC: 56 U/L (ref 30–99)
ALT SERPL-CCNC: 36 U/L (ref 2–50)
ANION GAP SERPL CALC-SCNC: 10 MMOL/L (ref 7–16)
AST SERPL-CCNC: 26 U/L (ref 12–45)
BILIRUB SERPL-MCNC: 0.4 MG/DL (ref 0.1–1.5)
BUN SERPL-MCNC: 22 MG/DL (ref 8–22)
CALCIUM ALBUM COR SERPL-MCNC: 9.2 MG/DL (ref 8.5–10.5)
CALCIUM SERPL-MCNC: 9.5 MG/DL (ref 8.5–10.5)
CHLORIDE SERPL-SCNC: 106 MMOL/L (ref 96–112)
CHOLEST SERPL-MCNC: 139 MG/DL (ref 100–199)
CO2 SERPL-SCNC: 25 MMOL/L (ref 20–33)
CREAT SERPL-MCNC: 1.02 MG/DL (ref 0.5–1.4)
EST. AVERAGE GLUCOSE BLD GHB EST-MCNC: 126 MG/DL
FASTING STATUS PATIENT QL REPORTED: NORMAL
GFR SERPLBLD CREATININE-BSD FMLA CKD-EPI: 56 ML/MIN/1.73 M 2
GLOBULIN SER CALC-MCNC: 2.9 G/DL (ref 1.9–3.5)
GLUCOSE SERPL-MCNC: 106 MG/DL (ref 65–99)
HBA1C MFR BLD: 6 % (ref 4–5.6)
HDLC SERPL-MCNC: 61 MG/DL
LDLC SERPL CALC-MCNC: 54 MG/DL
POTASSIUM SERPL-SCNC: 4.7 MMOL/L (ref 3.6–5.5)
PROT SERPL-MCNC: 7.3 G/DL (ref 6–8.2)
SODIUM SERPL-SCNC: 141 MMOL/L (ref 135–145)
TRIGL SERPL-MCNC: 122 MG/DL (ref 0–149)

## 2025-05-27 PROCEDURE — 36415 COLL VENOUS BLD VENIPUNCTURE: CPT

## 2025-05-27 PROCEDURE — 80061 LIPID PANEL: CPT

## 2025-05-27 PROCEDURE — 80053 COMPREHEN METABOLIC PANEL: CPT

## 2025-05-27 PROCEDURE — 83036 HEMOGLOBIN GLYCOSYLATED A1C: CPT | Mod: GA

## 2025-06-04 ENCOUNTER — APPOINTMENT (OUTPATIENT)
Dept: MEDICAL GROUP | Age: 80
End: 2025-06-04
Payer: MEDICARE

## 2025-06-04 VITALS
SYSTOLIC BLOOD PRESSURE: 128 MMHG | HEIGHT: 63 IN | TEMPERATURE: 98.3 F | HEART RATE: 91 BPM | DIASTOLIC BLOOD PRESSURE: 62 MMHG | OXYGEN SATURATION: 96 % | BODY MASS INDEX: 28.88 KG/M2 | WEIGHT: 163 LBS

## 2025-06-04 DIAGNOSIS — Z00.00 MEDICARE ANNUAL WELLNESS VISIT, SUBSEQUENT: Primary | ICD-10-CM

## 2025-06-04 DIAGNOSIS — E11.65 UNCONTROLLED TYPE 2 DIABETES MELLITUS WITH HYPERGLYCEMIA (HCC): ICD-10-CM

## 2025-06-04 DIAGNOSIS — E55.9 VITAMIN D DEFICIENCY: ICD-10-CM

## 2025-06-04 DIAGNOSIS — E78.2 MIXED HYPERLIPIDEMIA: ICD-10-CM

## 2025-06-04 DIAGNOSIS — I10 ESSENTIAL HYPERTENSION: ICD-10-CM

## 2025-06-04 PROCEDURE — 3078F DIAST BP <80 MM HG: CPT | Performed by: INTERNAL MEDICINE

## 2025-06-04 PROCEDURE — 99214 OFFICE O/P EST MOD 30 MIN: CPT | Mod: 25 | Performed by: INTERNAL MEDICINE

## 2025-06-04 PROCEDURE — 3074F SYST BP LT 130 MM HG: CPT | Performed by: INTERNAL MEDICINE

## 2025-06-04 PROCEDURE — G0439 PPPS, SUBSEQ VISIT: HCPCS | Performed by: INTERNAL MEDICINE

## 2025-06-04 RX ORDER — MULTIVIT-MIN/IRON/FOLIC ACID/K 18-600-40
2000 CAPSULE ORAL DAILY
Qty: 100 CAPSULE | Refills: 4 | Status: SHIPPED | OUTPATIENT
Start: 2025-06-04 | End: 2025-06-04 | Stop reason: SDUPTHER

## 2025-06-04 RX ORDER — SEMAGLUTIDE 2.68 MG/ML
2 INJECTION, SOLUTION SUBCUTANEOUS
Qty: 9 ML | Refills: 2 | Status: SHIPPED | OUTPATIENT
Start: 2025-06-04

## 2025-06-04 RX ORDER — ATORVASTATIN CALCIUM 40 MG/1
40 TABLET, FILM COATED ORAL DAILY
Qty: 100 TABLET | Refills: 2 | Status: SHIPPED | OUTPATIENT
Start: 2025-06-04 | End: 2025-06-04 | Stop reason: SDUPTHER

## 2025-06-04 RX ORDER — LOSARTAN POTASSIUM 100 MG/1
100 TABLET ORAL DAILY
Qty: 100 TABLET | Refills: 2 | Status: SHIPPED | OUTPATIENT
Start: 2025-06-04

## 2025-06-04 RX ORDER — ATORVASTATIN CALCIUM 40 MG/1
40 TABLET, FILM COATED ORAL DAILY
Qty: 100 TABLET | Refills: 2 | Status: SHIPPED | OUTPATIENT
Start: 2025-06-04

## 2025-06-04 RX ORDER — LOSARTAN POTASSIUM 100 MG/1
100 TABLET ORAL DAILY
Qty: 100 TABLET | Refills: 2 | Status: SHIPPED | OUTPATIENT
Start: 2025-06-04 | End: 2025-06-04 | Stop reason: SDUPTHER

## 2025-06-04 RX ORDER — MULTIVIT-MIN/IRON/FOLIC ACID/K 18-600-40
2000 CAPSULE ORAL DAILY
Qty: 100 CAPSULE | Refills: 4 | Status: SHIPPED | OUTPATIENT
Start: 2025-06-04

## 2025-06-04 ASSESSMENT — FIBROSIS 4 INDEX: FIB4 SCORE: 1.84

## 2025-06-04 ASSESSMENT — PATIENT HEALTH QUESTIONNAIRE - PHQ9: CLINICAL INTERPRETATION OF PHQ2 SCORE: 0

## 2025-06-04 ASSESSMENT — ACTIVITIES OF DAILY LIVING (ADL): BATHING_REQUIRES_ASSISTANCE: 0

## 2025-06-04 ASSESSMENT — ENCOUNTER SYMPTOMS: GENERAL WELL-BEING: GOOD

## 2025-06-04 NOTE — PROGRESS NOTES
Chief Complaint   Patient presents with    Annual Exam     AWV lab review        HPI:  Jovita Llanos is a 79 y.o. here for Medicare Annual Wellness Visit    History of Present Illness  The patient is a 79-year-old female who presents for evaluation of kidney function, osteopenia, cataracts, and diabetes.    Kidney Function Concerns  She has been informed of a failed kidney test and is seeking further evaluation. She experiences occasional aches and pains, which she manages with Aleve, taken once daily. Her blood pressure remains within normal limits.  - Onset: Failed kidney test identified recently.  - Character: Occasional aches and pains.  - Alleviating Factors: Managed with Aleve, taken once daily.  - Severity: Blood pressure remains within normal limits.    Osteopenia  A recent DEXA scan revealed a 3 percent bone loss over an unspecified period. She is not currently receiving any treatment for this condition.  - Onset: Bone loss identified in a recent DEXA scan.  - Character: 3 percent bone loss.  - Severity: Not currently receiving treatment.    Cataracts  She has been advised by Dr. Ribeiro to undergo cataract surgery, which she has scheduled for this afternoon.  - Onset: Advised to undergo surgery recently.  - Timing: Surgery scheduled for this afternoon.    Diabetes and Weight Management  She has observed a recent weight gain, despite being on Ozempic 1 mg weekly, which was previously effective in promoting weight loss. She is considering increasing the dose of Ozempic. Her A1c levels have been gradually increasing towards 7 but have recently decreased to 6. She does not experience reflux, bloating, or diarrhea.  - Onset: Recent weight gain observed.  - Character: Weight gain despite Ozempic use; A1c levels fluctuating.  - Alleviating/Aggravating Factors: Considering increasing Ozempic dose.  - Severity: A1c levels recently decreased to 6.    She reports no need for medication refills at this time. She  has a history of constipation, which she manages by increasing her water intake and occasionally using a generic laxative, approximately 5 to 6 times. She also reports intermittent gas and bloating. She is up to date on her vaccinations and tests.    PAST SURGICAL HISTORY:  - Cataract surgery scheduled for 06/04/2025  -    SOCIAL HISTORY  She drinks alcohol occasionally.         Patient Active Problem List    Diagnosis Date Noted    Non-seasonal allergic rhinitis due to pollen 10/17/2023    Benign meningioma (HCC) 05/23/2018    DDD (degenerative disc disease), lumbar 06/29/2016    Uncontrolled type 2 diabetes mellitus with hyperglycemia ([facility]) 10/27/2015    Osteoporosis, post-menopausal- since about 2002; on fosamax since early 2000s 09/17/2014    Mixed hyperlipidemia 08/27/2014    Essential hypertension 08/27/2014    Primary osteoarthritis involving multiple joints 08/27/2014    History of breast cancer- 2001; RT and lumpectomy; right side; neg nodes; tamoxifen x 5 yrs 08/27/2014    Vitamin D deficiency 08/27/2014       Current Medications[1]       Current supplements as per medication list.     Allergies: Other environmental    Current social contact/activities:      She  reports that she has never smoked. She has never used smokeless tobacco. She reports current alcohol use of about 1.0 oz of alcohol per week. She reports that she does not use drugs.  Counseling given: Not Answered      ROS:    Gait: Uses no assistive device  Ostomy: No  Other tubes: No  Amputations: No  Chronic oxygen use: No  Last eye exam: 09/2024  Wears hearing aids: No   : Denies any urinary leakage during the last 6 months    Screening:    Depression Screening  Little interest or pleasure in doing things?  0 - not at all  Feeling down, depressed , or hopeless? 0 - not at all  Patient Health Questionnaire Score: 0     If depressive symptoms identified deferred to follow up visit unless specifically addressed in assessment and  plan.    Interpretation of PHQ-9 Total Score   Score Severity   1-4 No Depression   5-9 Mild Depression   10-14 Moderate Depression   15-19 Moderately Severe Depression   20-27 Severe Depression    Screening for Cognitive Impairment  Do you or any of your friends or family members have any concern about your memory? No  Three Minute Recall (Village, Kitchen, Baby)  /3    Tristan clock face with all 12 numbers and set the hands to show 10 minutes past 11.  Yes    Cognitive concerns identified deferred for follow up unless specifically addressed in assessment and plan.    Fall Risk Assessment  Has the patient had two or more falls in the last year or any fall with injury in the last year?  No    Safety Assessment  Do you always wear your seatbelt?  Yes  Any changes to home needed to function safely? No  Difficulty hearing.  No  Patient counseled about all safety risks that were identified.    Functional Assessment ADLs  Are there any barriers preventing you from cooking for yourself or meeting nutritional needs?  No.    Are there any barriers preventing you from driving safely or obtaining transportation?  No.    Are there any barriers preventing you from using a telephone or calling for help?  No    Are there any barriers preventing you from shopping?  No.    Are there any barriers preventing you from taking care of your own finances?  No    Are there any barriers preventing you from managing your medications?  No    Are there any barriers preventing you from showering, bathing or dressing yourself? No    Are there any barriers preventing you from doing housework or laundry? No  Are there any barriers preventing you from using the toilet?No  Are you currently engaging in any exercise or physical activity?  Yes.      Self-Assessment of Health  What is your perception of your health? Good    Do you sleep more than six hours a night? Yes    In the past 7 days, how much did pain keep you from doing your normal work? None     Do you spend quality time with family or friends (virtually or in person)? Yes    Do you usually eat a heart healthy diet that constists of a variety of fruits, vegetables, whole grains and fiber? Yes    Do you eat foods high in fat and/or Fast Food more than three times per week? No    How concerned are you that your medical conditions are not being well managed? Not at all    Are you worried that in the next 2 months, you may not have stable housing that you own, rent, or stay in as part of a household? No      Advance Care Planning  Do you have an Advance Directive, Living Will, Durable Power of , or POLST? Yes  Advance Directive Living Will Durable Power of           Health Maintenance Summary            Current Care Gaps       Zoster (Shingles) Vaccines (3 of 3) Overdue since 12/18/2023      10/23/2023  Imm Admin: Zoster Vaccine Recombinant (RZV) (SHINGRIX)    08/02/2023  Imm Admin: Zoster Vaccine Live (ZVL) (Zostavax) - HISTORICAL DATA    11/07/2014  Imm Admin: Zoster Vaccine Live (ZVL) (Zostavax) - HISTORICAL DATA    05/09/2013  Imm Admin: Zoster Vaccine Live (ZVL) (Zostavax) - HISTORICAL DATA              Diabetes: Monofilament / LE Exam (Yearly) Overdue since 1/17/2025 01/17/2024  Diabetic Monofilament LE Exam    01/12/2023  Diabetic Monofilament Lower Extremity Exam    03/04/2021  Diabetic Monofilament Lower Extremity Exam    07/29/2020  Done    05/23/2018  Diabetic Monofilament Lower Extremity Exam     Only the first 5 history entries have been loaded, but more history exists.            COVID-19 Vaccine (7 - 2024-25 season) Overdue since 4/22/2025      10/22/2024  Imm Admin: COVID-19, mRNA, LNP-S, PF, gonzalo-sucrose, 30 mcg/0.3 mL    11/10/2022  Imm Admin: PFIZER BIVALENT SARS-COV-2 VACCINE (12+)    04/18/2022  Imm Admin: PFIZER GOLDSMITH CAP SARS-COV-2 VACCINATION (12+)    11/11/2021  Imm Admin: PFIZER PURPLE CAP SARS-COV-2 VACCINATION (12+)    02/11/2021  Imm Admin: PFIZER PURPLE CAP  SARS-COV-2 VACCINATION (12+)      Only the first 5 history entries have been loaded, but more history exists.              Annual Wellness Visit (Yearly) Overdue since 5/29/2025 05/29/2024  Level of Service: ANNUAL WELLNESS VISIT-INCLUDES PPPS SUBSEQUE*    05/29/2024  Visit Dx: Medicare annual wellness visit, subsequent    04/11/2023  Level of Service: [place] ANNUAL WELLNESS VISIT-INCLUDES PPPS SUBSEQUE*    04/11/2023  Visit Dx: Medicare annual wellness visit, subsequent    08/21/2015  Visit Dx: Encounter for Medicare annual wellness exam      Only the first 5 history entries have been loaded, but more history exists.                      Upcoming       Diabetes: Retinopathy Screening (Yearly) Next due on 10/16/2025      10/16/2024  RETINAL SCREENING RESULTS    08/14/2023  AMB EXTERNAL RETINAL SCREENING RESULTS    08/11/2022  REFERRAL FOR RETINAL SCREENING EXAM    08/11/2022  Referral for Retinal Screening Exam    03/25/2020  REFERRAL FOR RETINAL SCREENING EXAM      Only the first 5 history entries have been loaded, but more history exists.              Diabetes: Urine Protein Screening (Yearly) Next due on 10/30/2025      10/30/2024  MICROALBUMIN CREAT RATIO URINE    01/12/2024  MICROALBUMIN CREAT RATIO URINE    10/06/2023  MICROALBUMIN CREAT RATIO URINE    04/03/2023  MICROALBUMIN CREAT RATIO URINE    10/17/2022  MICROALBUMIN CREAT RATIO URINE      Only the first 5 history entries have been loaded, but more history exists.              A1c Screening (Every 6 Months) Next due on 11/27/2025 05/27/2025  HEMOGLOBIN A1C    10/30/2024  HEMOGLOBIN A1C    05/29/2024  POCT Hemoglobin A1C    01/12/2024  HEMOGLOBIN A1C    10/06/2023  HEMOGLOBIN A1C      Only the first 5 history entries have been loaded, but more history exists.              Fasting Lipid Profile (Yearly) Next due on 5/27/2026 05/27/2025  Lipid Profile    10/30/2024  Lipid Profile    01/12/2024  Lipid Profile    10/06/2023  Lipid Profile     04/03/2023  Lipid Profile      Only the first 5 history entries have been loaded, but more history exists.              SERUM CREATININE (Yearly) Next due on 5/27/2026 05/27/2025  Comp Metabolic Panel    10/30/2024  Comp Metabolic Panel    01/12/2024  Comp Metabolic Panel    10/06/2023  Comp Metabolic Panel    04/03/2023  Comp Metabolic Panel      Only the first 5 history entries have been loaded, but more history exists.              IMM DTaP/Tdap/Td Vaccine (2 - Td or Tdap) Next due on 9/16/2028 09/16/2018  Imm Admin: Tdap Vaccine    09/16/2018  Imm Admin: TD Vaccine              Bone Density Scan (Every 5 Years) Next due on 1/29/2030 01/29/2025  DS-BONE DENSITY STUDY (DEXA)    09/27/2019  DS-BONE DENSITY STUDY (DEXA)    11/10/2016  DS-BONE DENSITY STUDY (DEXA)    09/02/2014  DS-BONE DENSITY STUDY (DEXA)    08/27/2004  Previously completed      Only the first 5 history entries have been loaded, but more history exists.                      Completed or No Longer Recommended       Influenza Vaccine (Series Information) Completed      10/22/2024  Imm Admin: Influenza, unspecified formulation    09/22/2022  Imm Admin: Influenza, Unspecified - HISTORICAL DATA    10/18/2021  Imm Admin: Influenza, Unspecified - HISTORICAL DATA    11/11/2020  Imm Admin: Influenza Vaccine Quad Inj (Pf)    10/21/2019  Imm Admin: Influenza Vaccine Adult HD      Only the first 5 history entries have been loaded, but more history exists.              Hepatitis C Screening  Completed      01/26/2021  Hepatitis C Antibody component of HEP C VIRUS ANTIBODY              Pneumococcal Vaccine: 50+ Years (Series Information) Completed      03/30/2015  Imm Admin: Pneumococcal Conjugate Vaccine (Prevnar/PCV-13)    04/27/2013  Imm Admin: Pneumococcal polysaccharide vaccine (PPSV-23)              Hepatitis A Vaccine (Hep A) (Series Information) Aged Out      No completion history exists for this topic.              Hepatitis B Vaccine  (Hep B) (Series Information) Aged Out     No completion history exists for this topic.              HPV Vaccines (Series Information) Aged Out     No completion history exists for this topic.              Polio Vaccine (Inactivated Polio) (Series Information) Aged Out     No completion history exists for this topic.              Meningococcal Immunization (Series Information) Aged Out     No completion history exists for this topic.              Meningococcal B Vaccine (Series Information) Aged Out     No completion history exists for this topic.              Mammogram  Discontinued        Frequency changed to Never automatically (Topic No Longer Applies)    02/26/2025  MA-SCREENING MAMMO BILAT W/TOMOSYNTHESIS W/CAD    02/15/2024  MA-SCREENING MAMMO BILAT W/TOMOSYNTHESIS W/CAD    02/08/2023  MA-SCREENING MAMMO BILAT W/TOMOSYNTHESIS W/CAD    02/07/2022  MA-SCREENING MAMMO BILAT W/TOMOSYNTHESIS W/CAD     Only the first 5 history entries have been loaded, but more history exists.            Cervical Cancer Screening  Discontinued        Frequency changed to Never automatically (Topic No Longer Applies)    03/09/2016  THINPREP PAP,REFLEX HPV ON ASC-US ONLY    03/09/2016  PATHOLOGY GYN SPECIMEN    03/09/2016  Done              Colorectal Cancer Screening  Discontinued        Frequency changed to Never automatically (Topic No Longer Applies)    04/11/2023  Order placed for Referral to GI for Colonoscopy by Saurabh Ventura M.D.    01/16/2013  AMB REFERRAL TO GI FOR COLONOSCOPY                            Patient Care Team:  Saurabh Ventura M.D. as PCP - General (Internal Medicine)  Zackery Martínez D.O. (Inactive) (Gastroenterology)  Marquez Hernandez M.D. (Ophthalmology)        Social History[2]  Family History   Problem Relation Age of Onset    Stroke Mother     Hypertension Mother     Heart Attack Father     Diabetes Father     Hypertension Father     Hyperlipidemia Brother     Stroke Brother     Heart Disease  "Brother      She  has a past medical history of Allergy, Diabetes (HCC), Hyperlipidemia, Hypertension, and Osteoporosis.   Past Surgical History[3]    Exam:   /62 (BP Location: Left arm, Patient Position: Sitting, BP Cuff Size: Adult)   Pulse 91   Temp 36.8 °C (98.3 °F) (Temporal)   Ht 1.6 m (5' 3\")   Wt 73.9 kg (163 lb)   SpO2 96%  Body mass index is 28.87 kg/m².    Hearing good.    Dentition good  Alert, oriented in no acute distress.  Eye contact is good, speech goal directed, affect calm  HEENT neck thyroid heart lung exam extremities unremarkable    Hospital Outpatient Visit on 05/27/2025   Component Date Value    Cholesterol,Tot 05/27/2025 139     Triglycerides 05/27/2025 122     HDL 05/27/2025 61     LDL 05/27/2025 54     Sodium 05/27/2025 141     Potassium 05/27/2025 4.7     Chloride 05/27/2025 106     Co2 05/27/2025 25     Anion Gap 05/27/2025 10.0     Glucose 05/27/2025 106 (H)     Bun 05/27/2025 22     Creatinine 05/27/2025 1.02     Calcium 05/27/2025 9.5     Correct Calcium 05/27/2025 9.2     AST(SGOT) 05/27/2025 26     ALT(SGPT) 05/27/2025 36     Alkaline Phosphatase 05/27/2025 56     Total Bilirubin 05/27/2025 0.4     Albumin 05/27/2025 4.4     Total Protein 05/27/2025 7.3     Globulin 05/27/2025 2.9     A-G Ratio 05/27/2025 1.5     Glycohemoglobin 05/27/2025 6.0 (H)     Est Avg Glucose 05/27/2025 126     Fasting Status 05/27/2025 Fasting     GFR (CKD-EPI) 05/27/2025 56 (A)       Lab Results   Component Value Date/Time    HBA1C 6.0 (H) 05/27/2025 10:05 AM    HBA1C 6.2 (H) 10/30/2024 08:43 AM     Lab Results   Component Value Date/Time    SODIUM 141 05/27/2025 10:05 AM    POTASSIUM 4.7 05/27/2025 10:05 AM    CHLORIDE 106 05/27/2025 10:05 AM    CO2 25 05/27/2025 10:05 AM    GLUCOSE 106 (H) 05/27/2025 10:05 AM    BUN 22 05/27/2025 10:05 AM    CREATININE 1.02 05/27/2025 10:05 AM    ALKPHOSPHAT 56 05/27/2025 10:05 AM    ASTSGOT 26 05/27/2025 10:05 AM    ALTSGPT 36 05/27/2025 10:05 AM    " "TBILIRUBIN 0.4 05/27/2025 10:05 AM     No results found for: \"INR\"  Lab Results   Component Value Date/Time    CHOLSTRLTOT 139 05/27/2025 10:05 AM    LDL 54 05/27/2025 10:05 AM    HDL 61 05/27/2025 10:05 AM    TRIGLYCERIDE 122 05/27/2025 10:05 AM       No results found for: \"TESTOSTERONE\"  No results found for: \"TSH\"  Lab Results   Component Value Date/Time    FREET4 0.97 09/09/2014 09:25 AM     No results found for: \"URICACID\"  No components found for: \"VITB12\"  Lab Results   Component Value Date/Time    25HYDROXY 49 10/30/2024 08:43 AM    25HYDROXY 48 01/12/2024 09:10 AM       Assessment and Plan. The following treatment and monitoring plan is recommended:      Assessment & Plan  1. Renal insufficiency.  - Her GFR is currently at 56, indicating a need for increased fluid intake.  - She has been advised to avoid nonsteroidal anti-inflammatory drugs such as Advil, Aleve, Motrin, and ibuprofen due to their potential renal impact.  - Instead, she may use Extra Strength Tylenol for pain management.  - Monitoring of kidney function will continue with regular blood work.    2. Osteopenia.  - Her vitamin D levels were within the normal range as of 10/2024.  - She has been counseled to engage in weight-bearing exercises and maintain a daily intake of vitamin D.  - Annual monitoring of her vitamin D levels will be conducted.  - No current treatment for osteopenia has been initiated.    3. Cataracts.  - She has been reassured about the safety and efficacy of cataract surgery.  - Surgery has been scheduled for this afternoon.  - No contraindications for surgery were identified during the visit.  - Follow-up post-surgery will be coordinated as needed.    4. Diabetes mellitus.  - Her A1c levels have been recorded as 6.9 in both 2021 and 2022, meeting the criteria for diabetes diagnosis.  - Her LDL levels are currently at 64, which is within the target range for diabetic patients.  - She is on the appropriate dose of statin.  - " The dosage of Ozempic will be increased to 2 mg per dose, with a prescription for three pens provided.  - This prescription will be processed through [facility] under the diagnosis of diabetes.  - She has been informed of the potential for gastrointestinal upset with the increased dose and has been advised to monitor her response.  - Blood work will be ordered for her next visit to monitor kidney function and cholesterol levels.    Follow-up  The patient will follow up in 6 months.       1. Medicare annual wellness visit, subsequent  Reviewed and updated.  Current on all health maintenance requirements.  Will be getting cataract surgery in the near future.  Working on weight reduction with Mediterranean diet and exercise.  No new complaint issues or concerns other than difficulty continuing to lose weight.  Wants to increase her Ozempic which is being prescribed for diabetes which still shows an A1c greater than 6 for which an increase in GLP-1 would be appropriate.  See below.    2. Uncontrolled type 2 diabetes mellitus with hyperglycemia (HCC) (Primary)  Not at goal increase semaglutide to 2 mg per dose weekly.  - Diabetic Monofilament LE Exam  - Semaglutide, 2 MG/DOSE, (OZEMPIC, 2 MG/DOSE,) 8 MG/3ML Solution Pen-injector; Inject 2 mg under the skin every 7 days.  Dispense: 9 mL; Refill: 2  - metFORMIN (GLUCOPHAGE) 500 MG Tab; Take 1 Tablet by mouth 2 times a day with meals.  Dispense: 180 Tablet; Refill: 4  - Comp Metabolic Panel; Future  - CBC WITH DIFFERENTIAL; Future  - TSH; Future  - Lipid Profile; Future  - HEMOGLOBIN A1C; Future  - MICROALBUMIN CREAT RATIO URINE; Future    3. Mixed hyperlipidemia  Good control continue current regimen  - atorvastatin (LIPITOR) 40 MG Tab; Take 1 Tablet by mouth every day.  Dispense: 100 Tablet; Refill: 2  - Comp Metabolic Panel; Future  - CBC WITH DIFFERENTIAL; Future  - TSH; Future  - Lipid Profile; Future    4. Essential hypertension  Good control continue current  regimen  - losartan (COZAAR) 100 MG Tab; Take 1 Tablet by mouth every day.  Dispense: 100 Tablet; Refill: 2    5. Vitamin D deficiency        Good control continue current regimen  - Cholecalciferol (VITAMIN D) 50 MCG (2000 UT) Cap; Take 1 Capsule by mouth every day.  Dispense: 100 Capsule; Refill: 4  - VITAMIN D,25 HYDROXY (DEFICIENCY); Future'      Services suggested: No services needed at this time  Health Care Screening: Age-appropriate preventive services recommended by USPTF and ACIP covered by Medicare were discussed today. Services ordered if indicated and agreed upon by the patient.  Referrals offered: Community-based lifestyle interventions to reduce health risks and promote self-management and wellness, fall prevention, nutrition, physical activity, tobacco-use cessation, weight loss, and mental health services as per orders if indicated.    Discussion today about general wellness and lifestyle habits:    Prevent falls and reduce trip hazards; Cautioned about securing or removing rugs.  Have a working fire alarm and carbon monoxide detector;   Engage in regular physical activity and social activities     Follow-up: No follow-ups on file.          [1]   Current Outpatient Medications   Medication Sig Dispense Refill    losartan (COZAAR) 100 MG Tab Take 1 Tablet by mouth every day. 90 Tablet 4    metFORMIN (GLUCOPHAGE) 500 MG Tab Take 1 Tablet by mouth 2 times a day with meals. 180 Tablet 4    Semaglutide, 1 MG/DOSE, 4 MG/3ML Solution Pen-injector Inject 1 mg under the skin every 7 days. 12 mL 3    atorvastatin (LIPITOR) 40 MG Tab TAKE 1 TABLET EVERY        EVENING, REPLACES 20MG DOSE 90 Tablet 3    Cholecalciferol (VITAMIN D) 50 MCG (2000 UT) Cap Take 1 Capsule by mouth every day. 100 Capsule 4     No current facility-administered medications for this visit.   [2]   Social History  Tobacco Use    Smoking status: Never    Smokeless tobacco: Never   Vaping Use    Vaping status: Never Used   Substance Use  Topics    Alcohol use: Yes     Alcohol/week: 1.0 oz     Types: 1 Glasses of wine, 1 Cans of beer per week     Comment: occasional    Drug use: No   [3]   Past Surgical History:  Procedure Laterality Date    BREAST BIOPSY  2001    LUMPECTOMY  2001    right breast CA     CHOLECYSTECTOMY      MASTECTOMY Right     partial

## 2025-07-25 DIAGNOSIS — Z00.6 CLINICAL TRIAL PARTICIPANT: ICD-10-CM

## 2025-07-31 ENCOUNTER — HOSPITAL ENCOUNTER (OUTPATIENT)
Dept: LAB | Facility: MEDICAL CENTER | Age: 80
End: 2025-07-31
Attending: FAMILY MEDICINE
Payer: MEDICARE

## 2025-08-11 ENCOUNTER — RESULTS FOLLOW-UP (OUTPATIENT)
Dept: MEDICAL GROUP | Facility: PHYSICIAN GROUP | Age: 80
End: 2025-08-11
Payer: MEDICARE